# Patient Record
Sex: MALE | Race: WHITE | NOT HISPANIC OR LATINO | Employment: OTHER | URBAN - METROPOLITAN AREA
[De-identification: names, ages, dates, MRNs, and addresses within clinical notes are randomized per-mention and may not be internally consistent; named-entity substitution may affect disease eponyms.]

---

## 2017-03-16 RX ORDER — CARVEDILOL 6.25 MG/1
TABLET ORAL
Qty: 180 TABLET | Refills: 0 | Status: SHIPPED | OUTPATIENT
Start: 2017-03-16 | End: 2017-06-14 | Stop reason: SDUPTHER

## 2017-03-21 ENCOUNTER — OFFICE VISIT (OUTPATIENT)
Dept: INTERNAL MEDICINE | Age: 77
End: 2017-03-21

## 2017-03-21 VITALS
RESPIRATION RATE: 12 BRPM | DIASTOLIC BLOOD PRESSURE: 70 MMHG | WEIGHT: 201 LBS | BODY MASS INDEX: 28.77 KG/M2 | HEIGHT: 70 IN | HEART RATE: 72 BPM | SYSTOLIC BLOOD PRESSURE: 120 MMHG

## 2017-03-21 DIAGNOSIS — N18.30 TYPE 2 DIABETES MELLITUS WITH STAGE 3 CHRONIC KIDNEY DISEASE, WITHOUT LONG-TERM CURRENT USE OF INSULIN (HCC): ICD-10-CM

## 2017-03-21 DIAGNOSIS — I10 BENIGN ESSENTIAL HYPERTENSION: Primary | ICD-10-CM

## 2017-03-21 DIAGNOSIS — E78.2 MIXED HYPERLIPIDEMIA: ICD-10-CM

## 2017-03-21 DIAGNOSIS — E11.22 TYPE 2 DIABETES MELLITUS WITH STAGE 3 CHRONIC KIDNEY DISEASE, WITHOUT LONG-TERM CURRENT USE OF INSULIN (HCC): ICD-10-CM

## 2017-03-21 LAB
ALBUMIN SERPL-MCNC: 4.6 G/DL (ref 3.5–5.2)
ALBUMIN/GLOB SERPL: 1.6 G/DL
ALP SERPL-CCNC: 73 U/L (ref 39–117)
ALT SERPL-CCNC: 41 U/L (ref 1–41)
AST SERPL-CCNC: 24 U/L (ref 1–40)
BILIRUB SERPL-MCNC: 0.3 MG/DL (ref 0.1–1.2)
BUN SERPL-MCNC: 30 MG/DL (ref 8–23)
BUN/CREAT SERPL: 19 (ref 7–25)
CALCIUM SERPL-MCNC: 10 MG/DL (ref 8.6–10.5)
CHLORIDE SERPL-SCNC: 102 MMOL/L (ref 98–107)
CHOLEST SERPL-MCNC: 252 MG/DL (ref 0–200)
CO2 SERPL-SCNC: 24.3 MMOL/L (ref 22–29)
CREAT SERPL-MCNC: 1.58 MG/DL (ref 0.76–1.27)
GLOBULIN SER CALC-MCNC: 2.9 GM/DL
GLUCOSE SERPL-MCNC: 117 MG/DL (ref 65–99)
HBA1C MFR BLD: 5.79 % (ref 4.8–5.6)
HDLC SERPL-MCNC: 39 MG/DL (ref 40–60)
LDLC SERPL CALC-MCNC: 175 MG/DL (ref 0–100)
POTASSIUM SERPL-SCNC: 4.9 MMOL/L (ref 3.5–5.2)
PROT SERPL-MCNC: 7.5 G/DL (ref 6–8.5)
SODIUM SERPL-SCNC: 143 MMOL/L (ref 136–145)
TRIGL SERPL-MCNC: 188 MG/DL (ref 0–150)
VLDLC SERPL CALC-MCNC: 37.6 MG/DL (ref 5–40)

## 2017-03-21 PROCEDURE — 99214 OFFICE O/P EST MOD 30 MIN: CPT | Performed by: INTERNAL MEDICINE

## 2017-03-21 NOTE — PROGRESS NOTES
Benja Macedo is a 76 y.o. male who presents with   Chief Complaint   Patient presents with   • Hypertension     Checkup; lab updates   • Hyperlipidemia     As above.  Patient has not had any atorvastatin for the past 5 months.  He said he had back pain while taking it.  When he quit taking it the back pain went away.   • Diabetes     Checkup; lab updates.  Patient is not monitoring blood sugars at home.   .    Hypertension   This is a chronic problem. The current episode started more than 1 year ago. The problem is controlled. Past treatments include diuretics, beta blockers and ACE inhibitors. The current treatment provides moderate improvement. There are no compliance problems.  Hypertensive end-organ damage includes kidney disease. Identifiable causes of hypertension include chronic renal disease.   Hyperlipidemia   This is a chronic problem. The current episode started more than 1 year ago. The problem is controlled. Exacerbating diseases include chronic renal disease and diabetes. He is currently on no antihyperlipidemic treatment (Patient discontinue atorvastatin approximately 5 months ago because of back pain as noted in the history above.). Compliance problems include medication side effects.    Diabetes   He presents for his follow-up diabetic visit. He has type 2 diabetes mellitus. His disease course has been stable. There are no hypoglycemic complications. Symptoms are stable. Diabetic complications include nephropathy. Current diabetic treatment includes oral agent (monotherapy). He is compliant with treatment most of the time. An ACE inhibitor/angiotensin II receptor blocker is being taken. He does not see a podiatrist (Declines referral).Eye exam is not current (Declines referral).        The following portions of the patient's history were reviewed and updated as appropriate: allergies, current medications, past medical history and problem list.    Review of Systems   Constitutional: Negative.     HENT: Negative.    Eyes: Negative.    Respiratory: Negative.    Cardiovascular: Negative.    Genitourinary: Negative.    Musculoskeletal: Negative.    Skin: Negative.    Neurological: Negative.    Psychiatric/Behavioral: Negative.        Objective   Physical Exam   Constitutional: He is oriented to person, place, and time. He appears well-developed and well-nourished. No distress.   HENT:   Head: Normocephalic and atraumatic.   Eyes: Conjunctivae and EOM are normal. Pupils are equal, round, and reactive to light.   Neck: Normal range of motion. Neck supple. No thyromegaly present.   Neck exam negative.  Carotid auscultation normal-no bruits heard.   Cardiovascular: Normal rate, regular rhythm, normal heart sounds and intact distal pulses.  Exam reveals no gallop and no friction rub.    No murmur heard.  Pulmonary/Chest: Effort normal and breath sounds normal. No respiratory distress. He has no wheezes. He has no rales. He exhibits no tenderness.   Neurological: He is alert and oriented to person, place, and time.   Psychiatric: He has a normal mood and affect. His behavior is normal. Judgment and thought content normal.   Nursing note and vitals reviewed.      Assessment/Plan   Benja was seen today for hypertension, hyperlipidemia and diabetes.    Diagnoses and all orders for this visit:    Benign essential hypertension  -     Comprehensive Metabolic Panel  -     Lipid Panel    Mixed hyperlipidemia  -     Lipid Panel    Type 2 diabetes mellitus with stage 3 chronic kidney disease, without long-term current use of insulin  -     Comprehensive Metabolic Panel  -     Hemoglobin A1c        Plan: Labs as above.Today's exam unremarkable for any new problems or events.  Continue all current treatment as prescribed.  A follow-up checkup/lab update visit is advised for 6 months.    The patient has not had a colonoscopy.  The patient is aware of the risks of undiagnosed colon cancer including GI bleed, bowel obstruction,  bowel perforation, metastatic colon cancer and death.  The patient voices an understanding of these risks but also voices a willingness to accept those risks based on the current decision to decline a colonoscopy.

## 2017-03-31 RX ORDER — LISINOPRIL 20 MG/1
TABLET ORAL
Qty: 30 TABLET | Refills: 4 | Status: SHIPPED | OUTPATIENT
Start: 2017-03-31 | End: 2017-09-06 | Stop reason: SDUPTHER

## 2017-03-31 RX ORDER — ATORVASTATIN CALCIUM 20 MG/1
TABLET, FILM COATED ORAL
Qty: 30 TABLET | Refills: 4 | Status: SHIPPED | OUTPATIENT
Start: 2017-03-31 | End: 2017-09-21 | Stop reason: SINTOL

## 2017-04-05 RX ORDER — HYDROCHLOROTHIAZIDE 12.5 MG/1
TABLET ORAL
Qty: 30 TABLET | Refills: 2 | Status: SHIPPED | OUTPATIENT
Start: 2017-04-05 | End: 2017-07-12 | Stop reason: SDUPTHER

## 2017-04-25 DIAGNOSIS — E11.22 TYPE 2 DIABETES MELLITUS WITH DIABETIC CHRONIC KIDNEY DISEASE (HCC): ICD-10-CM

## 2017-06-14 RX ORDER — CARVEDILOL 6.25 MG/1
TABLET ORAL
Qty: 180 TABLET | Refills: 0 | Status: SHIPPED | OUTPATIENT
Start: 2017-06-14 | End: 2017-09-10 | Stop reason: SDUPTHER

## 2017-07-12 RX ORDER — HYDROCHLOROTHIAZIDE 12.5 MG/1
TABLET ORAL
Qty: 30 TABLET | Refills: 1 | Status: SHIPPED | OUTPATIENT
Start: 2017-07-12 | End: 2017-09-07 | Stop reason: SDUPTHER

## 2017-09-06 RX ORDER — LISINOPRIL 20 MG/1
TABLET ORAL
Qty: 30 TABLET | Refills: 0 | Status: SHIPPED | OUTPATIENT
Start: 2017-09-06 | End: 2017-10-07 | Stop reason: SDUPTHER

## 2017-09-07 RX ORDER — HYDROCHLOROTHIAZIDE 12.5 MG/1
TABLET ORAL
Qty: 30 TABLET | Refills: 0 | Status: SHIPPED | OUTPATIENT
Start: 2017-09-07 | End: 2017-10-07 | Stop reason: SDUPTHER

## 2017-09-11 RX ORDER — CARVEDILOL 6.25 MG/1
TABLET ORAL
Qty: 180 TABLET | Refills: 1 | Status: SHIPPED | OUTPATIENT
Start: 2017-09-11 | End: 2018-03-07 | Stop reason: SDUPTHER

## 2017-09-21 ENCOUNTER — OFFICE VISIT (OUTPATIENT)
Dept: INTERNAL MEDICINE | Age: 77
End: 2017-09-21

## 2017-09-21 VITALS
DIASTOLIC BLOOD PRESSURE: 66 MMHG | BODY MASS INDEX: 28.77 KG/M2 | HEIGHT: 70 IN | SYSTOLIC BLOOD PRESSURE: 110 MMHG | RESPIRATION RATE: 12 BRPM | HEART RATE: 72 BPM | WEIGHT: 201 LBS

## 2017-09-21 DIAGNOSIS — E11.22 TYPE 2 DIABETES MELLITUS WITH STAGE 3 CHRONIC KIDNEY DISEASE, WITHOUT LONG-TERM CURRENT USE OF INSULIN (HCC): ICD-10-CM

## 2017-09-21 DIAGNOSIS — R35.1 NOCTURIA: ICD-10-CM

## 2017-09-21 DIAGNOSIS — L60.0 INGROWN RIGHT BIG TOENAIL: ICD-10-CM

## 2017-09-21 DIAGNOSIS — N18.30 TYPE 2 DIABETES MELLITUS WITH STAGE 3 CHRONIC KIDNEY DISEASE, WITHOUT LONG-TERM CURRENT USE OF INSULIN (HCC): ICD-10-CM

## 2017-09-21 DIAGNOSIS — E78.2 MIXED HYPERLIPIDEMIA: ICD-10-CM

## 2017-09-21 DIAGNOSIS — I10 BENIGN ESSENTIAL HYPERTENSION: Primary | ICD-10-CM

## 2017-09-21 LAB
ALBUMIN SERPL-MCNC: 4.6 G/DL (ref 3.5–5.2)
ALBUMIN/GLOB SERPL: 1.6 G/DL
ALP SERPL-CCNC: 70 U/L (ref 39–117)
ALT SERPL-CCNC: 31 U/L (ref 1–41)
AST SERPL-CCNC: 17 U/L (ref 1–40)
BILIRUB SERPL-MCNC: 0.4 MG/DL (ref 0.1–1.2)
BUN SERPL-MCNC: 32 MG/DL (ref 8–23)
BUN/CREAT SERPL: 18.1 (ref 7–25)
CALCIUM SERPL-MCNC: 10.5 MG/DL (ref 8.6–10.5)
CHLORIDE SERPL-SCNC: 102 MMOL/L (ref 98–107)
CHOLEST SERPL-MCNC: 270 MG/DL (ref 0–200)
CO2 SERPL-SCNC: 23 MMOL/L (ref 22–29)
CREAT SERPL-MCNC: 1.77 MG/DL (ref 0.76–1.27)
GLOBULIN SER CALC-MCNC: 2.8 GM/DL
GLUCOSE SERPL-MCNC: 99 MG/DL (ref 65–99)
HBA1C MFR BLD: 5.72 % (ref 4.8–5.6)
HDLC SERPL-MCNC: 35 MG/DL (ref 40–60)
LDLC SERPL CALC-MCNC: 188 MG/DL (ref 0–100)
POTASSIUM SERPL-SCNC: 5 MMOL/L (ref 3.5–5.2)
PROT SERPL-MCNC: 7.4 G/DL (ref 6–8.5)
PSA SERPL-MCNC: 0.61 NG/ML (ref 0–4)
SODIUM SERPL-SCNC: 140 MMOL/L (ref 136–145)
TRIGL SERPL-MCNC: 235 MG/DL (ref 0–150)
VLDLC SERPL CALC-MCNC: 47 MG/DL (ref 5–40)

## 2017-09-21 PROCEDURE — 99214 OFFICE O/P EST MOD 30 MIN: CPT | Performed by: INTERNAL MEDICINE

## 2017-09-21 NOTE — PROGRESS NOTES
"  Benja Macedo is a 76 y.o. male who presents with   Chief Complaint   Patient presents with   • Hypertension     Checkup; lab updates   • Hyperlipidemia     As above.  Patient reports that he has not been taking his atorvastatin for at least 8 months because \"it made my back hurts and I could hardly get out of bed\".  When he quit taking it the muscular symptoms resolved.   • Diabetes     As above.  Currently taking metformin 500 mg every morning with breakfast.   • Nocturia     Nighttime urinary frequency, largely dependent on fluid intake prior bedtime.   • Ingrown toenail-right big toe     Unrelated reason for today's visit: As he was walking out of the examining room he said he would like a referral to a podiatrist for a right great toe ingrown toenail.  Also being a diabetic he would like a podiatry evaluation for neuropathy.   .    Hypertension   This is a chronic problem. The current episode started more than 1 year ago. The problem is controlled. Past treatments include diuretics, beta blockers and ACE inhibitors. The current treatment provides moderate improvement. There are no compliance problems.    Hyperlipidemia   This is a chronic problem. The problem is uncontrolled. Recent lipid tests were reviewed and are high. He is currently on no antihyperlipidemic treatment (Patient stopped taking Lipitor about 8 months ago because of back pain as per history above.).   Diabetes   He presents for his follow-up diabetic visit. He has type 2 diabetes mellitus. His disease course has been stable. There are no hypoglycemic complications. Symptoms are stable. Current diabetic treatment includes oral agent (monotherapy). He is compliant with treatment most of the time. His weight is stable. An ACE inhibitor/angiotensin II receptor blocker is being taken. Sees podiatrist: Patient being referred to podiatry on today's visit.       The following portions of the patient's history were reviewed and updated as " appropriate: allergies, current medications, past medical history and problem list.    Review of Systems   Constitutional: Negative.    HENT: Negative.    Eyes: Negative.    Respiratory: Negative.    Cardiovascular: Negative.    Genitourinary: Negative.    Musculoskeletal: Negative.    Skin: Negative.    Neurological: Negative.    Psychiatric/Behavioral: Negative.        Objective   Physical Exam   Constitutional: He is oriented to person, place, and time. He appears well-developed and well-nourished. No distress.   HENT:   Head: Normocephalic and atraumatic.   Eyes: Conjunctivae and EOM are normal. Pupils are equal, round, and reactive to light.   Neck: Normal range of motion. Neck supple. No thyromegaly present.   Neck exam negative.  Carotid auscultation normal-no bruits heard.   Cardiovascular: Normal rate, regular rhythm, normal heart sounds and intact distal pulses.  Exam reveals no gallop and no friction rub.    No murmur heard.  Pulmonary/Chest: Effort normal and breath sounds normal. No respiratory distress. He has no wheezes. He has no rales. He exhibits no tenderness.   Neurological: He is alert and oriented to person, place, and time.   Psychiatric: He has a normal mood and affect. His behavior is normal. Judgment and thought content normal.   Nursing note and vitals reviewed.      Assessment/Plan   Benja was seen today for hypertension, hyperlipidemia, diabetes, nocturia and ingrown toenail-right big toe.    Diagnoses and all orders for this visit:    Benign essential hypertension  -     Comprehensive Metabolic Panel    Mixed hyperlipidemia  -     Comprehensive Metabolic Panel  -     Lipid Panel    Type 2 diabetes mellitus with stage 3 chronic kidney disease, without long-term current use of insulin  -     Comprehensive Metabolic Panel  -     Hemoglobin A1c  -     Ambulatory Referral to Podiatry    Ingrown right big toenail  -     Ambulatory Referral to Podiatry    Nocturia  -     PSA        Plan:  Labs as above.Today's exam unremarkable for any new problems or events.  Continue all current treatment as prescribed.  A follow-up checkup/lab update visit is advised for 6 months.    He will remain off of Lipitor 20 mg daily for now pending today's results.  He currently is taking Co-Q-10 And we may be able to continue this medication along with a statin resumption if needed.    The patient has not had a screening colonoscopy.  The patient refuses to have one at this time.  The patient is aware of the risks of undiagnosed colon cancer including GI bleed, bowel obstruction, bowel perforation, metastatic colon cancer and death.  The patient voices an understanding of these risks but also voices a willingness to accept those risks based on the current decision to decline a colonoscopy.

## 2017-10-09 RX ORDER — HYDROCHLOROTHIAZIDE 12.5 MG/1
TABLET ORAL
Qty: 30 TABLET | Refills: 5 | Status: SHIPPED | OUTPATIENT
Start: 2017-10-09 | End: 2018-04-05 | Stop reason: SDUPTHER

## 2017-10-09 RX ORDER — LISINOPRIL 20 MG/1
TABLET ORAL
Qty: 30 TABLET | Refills: 5 | Status: SHIPPED | OUTPATIENT
Start: 2017-10-09 | End: 2018-04-05 | Stop reason: SDUPTHER

## 2017-12-05 DIAGNOSIS — N18.30 TYPE 2 DIABETES MELLITUS WITH STAGE 3 CHRONIC KIDNEY DISEASE, WITHOUT LONG-TERM CURRENT USE OF INSULIN (HCC): Primary | ICD-10-CM

## 2017-12-05 DIAGNOSIS — E11.22 TYPE 2 DIABETES MELLITUS WITH STAGE 3 CHRONIC KIDNEY DISEASE, WITHOUT LONG-TERM CURRENT USE OF INSULIN (HCC): Primary | ICD-10-CM

## 2018-03-07 RX ORDER — CARVEDILOL 6.25 MG/1
TABLET ORAL
Qty: 180 TABLET | Refills: 0 | Status: SHIPPED | OUTPATIENT
Start: 2018-03-07 | End: 2018-06-04 | Stop reason: SDUPTHER

## 2018-03-09 DIAGNOSIS — N18.30 TYPE 2 DIABETES MELLITUS WITH STAGE 3 CHRONIC KIDNEY DISEASE, WITHOUT LONG-TERM CURRENT USE OF INSULIN (HCC): ICD-10-CM

## 2018-03-09 DIAGNOSIS — E11.22 TYPE 2 DIABETES MELLITUS WITH STAGE 3 CHRONIC KIDNEY DISEASE, WITHOUT LONG-TERM CURRENT USE OF INSULIN (HCC): ICD-10-CM

## 2018-03-22 ENCOUNTER — OFFICE VISIT (OUTPATIENT)
Dept: INTERNAL MEDICINE | Age: 78
End: 2018-03-22

## 2018-03-22 VITALS
TEMPERATURE: 97.4 F | OXYGEN SATURATION: 98 % | HEIGHT: 70 IN | BODY MASS INDEX: 29.09 KG/M2 | WEIGHT: 203.2 LBS | DIASTOLIC BLOOD PRESSURE: 76 MMHG | SYSTOLIC BLOOD PRESSURE: 126 MMHG | HEART RATE: 80 BPM | RESPIRATION RATE: 12 BRPM

## 2018-03-22 DIAGNOSIS — E78.2 MIXED HYPERLIPIDEMIA: ICD-10-CM

## 2018-03-22 DIAGNOSIS — I10 BENIGN ESSENTIAL HYPERTENSION: Primary | ICD-10-CM

## 2018-03-22 DIAGNOSIS — N18.30 TYPE 2 DIABETES MELLITUS WITH STAGE 3 CHRONIC KIDNEY DISEASE, WITHOUT LONG-TERM CURRENT USE OF INSULIN (HCC): ICD-10-CM

## 2018-03-22 DIAGNOSIS — E11.22 TYPE 2 DIABETES MELLITUS WITH STAGE 3 CHRONIC KIDNEY DISEASE, WITHOUT LONG-TERM CURRENT USE OF INSULIN (HCC): ICD-10-CM

## 2018-03-22 LAB
ALBUMIN SERPL-MCNC: 4.6 G/DL (ref 3.5–5.2)
ALBUMIN/GLOB SERPL: 1.6 G/DL
ALP SERPL-CCNC: 69 U/L (ref 39–117)
ALT SERPL-CCNC: 33 U/L (ref 1–41)
AST SERPL-CCNC: 19 U/L (ref 1–40)
BILIRUB SERPL-MCNC: 0.4 MG/DL (ref 0.1–1.2)
BUN SERPL-MCNC: 33 MG/DL (ref 8–23)
BUN/CREAT SERPL: 18.8 (ref 7–25)
CALCIUM SERPL-MCNC: 10.5 MG/DL (ref 8.6–10.5)
CHLORIDE SERPL-SCNC: 100 MMOL/L (ref 98–107)
CHOLEST SERPL-MCNC: 270 MG/DL (ref 0–200)
CO2 SERPL-SCNC: 22.8 MMOL/L (ref 22–29)
CREAT SERPL-MCNC: 1.76 MG/DL (ref 0.76–1.27)
GFR SERPLBLD CREATININE-BSD FMLA CKD-EPI: 38 ML/MIN/1.73
GFR SERPLBLD CREATININE-BSD FMLA CKD-EPI: 46 ML/MIN/1.73
GLOBULIN SER CALC-MCNC: 2.8 GM/DL
GLUCOSE SERPL-MCNC: 130 MG/DL (ref 65–99)
HBA1C MFR BLD: 5.7 % (ref 4.8–5.6)
HDLC SERPL-MCNC: 34 MG/DL (ref 40–60)
LDLC SERPL CALC-MCNC: 185 MG/DL (ref 0–100)
POTASSIUM SERPL-SCNC: 5.2 MMOL/L (ref 3.5–5.2)
PROT SERPL-MCNC: 7.4 G/DL (ref 6–8.5)
SODIUM SERPL-SCNC: 139 MMOL/L (ref 136–145)
T4 FREE SERPL-MCNC: 1.44 NG/DL (ref 0.93–1.7)
TRIGL SERPL-MCNC: 257 MG/DL (ref 0–150)
TSH SERPL DL<=0.005 MIU/L-ACNC: 1.54 MIU/ML (ref 0.27–4.2)
VLDLC SERPL CALC-MCNC: 51.4 MG/DL (ref 5–40)

## 2018-03-22 PROCEDURE — 99214 OFFICE O/P EST MOD 30 MIN: CPT | Performed by: INTERNAL MEDICINE

## 2018-03-22 NOTE — PROGRESS NOTES
Benja Macedo is a 77 y.o. male who presents with   Chief Complaint   Patient presents with   • Hypertension   • Hyperlipidemia   • Diabetes   .    77-year-old male.  Presents for his six-month checkup/lab update visit.  No problems or complaints on today's visit.      Hypertension   This is a chronic problem. The current episode started more than 1 year ago. The problem is controlled. Past treatments include diuretics, beta blockers and ACE inhibitors. Compliance problems include diet and exercise.  Current antihypertension treatment includes diuretics, beta blockers and ACE inhibitors.   Hyperlipidemia   This is a chronic problem. The current episode started more than 1 year ago. Recent lipid tests were reviewed and are high. He is currently on no antihyperlipidemic treatment (Previous intolerance (myalgias) to atorvastatin.  ). Compliance problems include adherence to diet and adherence to exercise.    Diabetes   He presents for his follow-up diabetic visit. He has type 2 diabetes mellitus. His disease course has been stable. There are no hypoglycemic associated symptoms. There are no diabetic associated symptoms. There are no hypoglycemic complications. There are no diabetic complications. Current diabetic treatment includes oral agent (monotherapy). He is compliant with treatment most of the time. An ACE inhibitor/angiotensin II receptor blocker is being taken.        The following portions of the patient's history were reviewed and updated as appropriate: allergies, current medications, past medical history and problem list.    Review of Systems   Constitutional: Negative.    HENT: Negative.    Eyes: Negative.    Respiratory: Negative.    Cardiovascular: Negative.    Genitourinary: Negative.    Musculoskeletal: Negative.    Skin: Negative.    Neurological: Negative.    Psychiatric/Behavioral: Negative.        Objective   Physical Exam   Constitutional: He is oriented to person, place, and time. He  appears well-developed and well-nourished. No distress.   HENT:   Head: Normocephalic and atraumatic.   Eyes: Conjunctivae and EOM are normal. Pupils are equal, round, and reactive to light.   Neck: Normal range of motion. Neck supple. No thyromegaly present.   Neck exam negative.  Carotid auscultation normal-no bruits heard.   Cardiovascular: Normal rate, regular rhythm, normal heart sounds and intact distal pulses.  Exam reveals no gallop and no friction rub.    No murmur heard.  Pulmonary/Chest: Effort normal and breath sounds normal. No respiratory distress. He has no wheezes. He has no rales. He exhibits no tenderness.   Musculoskeletal:   Hemiparesthesias from previous stroke.  Patient cannot get out of a chair unless there are arms on the chair.  Also uses a walker for ambulation and stability.   Neurological: He is alert and oriented to person, place, and time.   Psychiatric: He has a normal mood and affect. His behavior is normal. Judgment and thought content normal.   Nursing note and vitals reviewed.      Assessment/Plan   Benja was seen today for hypertension, hyperlipidemia and diabetes.    Diagnoses and all orders for this visit:    Benign essential hypertension  -     Comprehensive Metabolic Panel  -     TSH+Free T4    Mixed hyperlipidemia  -     Comprehensive Metabolic Panel  -     Lipid Panel  -     TSH+Free T4    Type 2 diabetes mellitus with stage 3 chronic kidney disease, without long-term current use of insulin  -     Comprehensive Metabolic Panel  -     Hemoglobin A1c  -     TSH+Free T4        Plan: Labs as above.Today's exam unremarkable for any new problems or events.  Continue all current treatment as prescribed.  A follow-up checkup/lab update visit is advised for 6 months assuming today's labs are all acceptable.

## 2018-04-05 RX ORDER — LISINOPRIL 20 MG/1
TABLET ORAL
Qty: 30 TABLET | Refills: 4 | Status: SHIPPED | OUTPATIENT
Start: 2018-04-05 | End: 2018-09-09 | Stop reason: SDUPTHER

## 2018-04-05 RX ORDER — HYDROCHLOROTHIAZIDE 12.5 MG/1
TABLET ORAL
Qty: 30 TABLET | Refills: 4 | Status: SHIPPED | OUTPATIENT
Start: 2018-04-05 | End: 2018-09-09 | Stop reason: SDUPTHER

## 2018-05-05 DIAGNOSIS — N18.30 TYPE 2 DIABETES MELLITUS WITH STAGE 3 CHRONIC KIDNEY DISEASE, WITHOUT LONG-TERM CURRENT USE OF INSULIN (HCC): ICD-10-CM

## 2018-05-05 DIAGNOSIS — E11.22 TYPE 2 DIABETES MELLITUS WITH STAGE 3 CHRONIC KIDNEY DISEASE, WITHOUT LONG-TERM CURRENT USE OF INSULIN (HCC): ICD-10-CM

## 2018-06-02 DIAGNOSIS — E11.22 TYPE 2 DIABETES MELLITUS WITH STAGE 3 CHRONIC KIDNEY DISEASE, WITHOUT LONG-TERM CURRENT USE OF INSULIN (HCC): ICD-10-CM

## 2018-06-02 DIAGNOSIS — N18.30 TYPE 2 DIABETES MELLITUS WITH STAGE 3 CHRONIC KIDNEY DISEASE, WITHOUT LONG-TERM CURRENT USE OF INSULIN (HCC): ICD-10-CM

## 2018-06-04 RX ORDER — CARVEDILOL 6.25 MG/1
TABLET ORAL
Qty: 180 TABLET | Refills: 0 | Status: SHIPPED | OUTPATIENT
Start: 2018-06-04 | End: 2018-11-12 | Stop reason: SDUPTHER

## 2018-08-30 DIAGNOSIS — E11.22 TYPE 2 DIABETES MELLITUS WITH STAGE 3 CHRONIC KIDNEY DISEASE, WITHOUT LONG-TERM CURRENT USE OF INSULIN (HCC): ICD-10-CM

## 2018-08-30 DIAGNOSIS — N18.30 TYPE 2 DIABETES MELLITUS WITH STAGE 3 CHRONIC KIDNEY DISEASE, WITHOUT LONG-TERM CURRENT USE OF INSULIN (HCC): ICD-10-CM

## 2018-09-10 RX ORDER — LISINOPRIL 20 MG/1
TABLET ORAL
Qty: 30 TABLET | Refills: 0 | Status: SHIPPED | OUTPATIENT
Start: 2018-09-10 | End: 2018-10-09 | Stop reason: SDUPTHER

## 2018-09-10 RX ORDER — HYDROCHLOROTHIAZIDE 12.5 MG/1
TABLET ORAL
Qty: 30 TABLET | Refills: 3 | Status: SHIPPED | OUTPATIENT
Start: 2018-09-10 | End: 2019-01-12 | Stop reason: SDUPTHER

## 2018-09-26 ENCOUNTER — OFFICE VISIT (OUTPATIENT)
Dept: INTERNAL MEDICINE | Age: 78
End: 2018-09-26

## 2018-09-26 VITALS
BODY MASS INDEX: 28.06 KG/M2 | DIASTOLIC BLOOD PRESSURE: 72 MMHG | HEART RATE: 67 BPM | OXYGEN SATURATION: 96 % | WEIGHT: 196 LBS | RESPIRATION RATE: 13 BRPM | TEMPERATURE: 97.3 F | HEIGHT: 70 IN | SYSTOLIC BLOOD PRESSURE: 128 MMHG

## 2018-09-26 DIAGNOSIS — E78.2 MIXED HYPERLIPIDEMIA: ICD-10-CM

## 2018-09-26 DIAGNOSIS — E11.22 TYPE 2 DIABETES MELLITUS WITH STAGE 3 CHRONIC KIDNEY DISEASE, WITHOUT LONG-TERM CURRENT USE OF INSULIN (HCC): ICD-10-CM

## 2018-09-26 DIAGNOSIS — N18.30 TYPE 2 DIABETES MELLITUS WITH STAGE 3 CHRONIC KIDNEY DISEASE, WITHOUT LONG-TERM CURRENT USE OF INSULIN (HCC): ICD-10-CM

## 2018-09-26 DIAGNOSIS — I10 BENIGN ESSENTIAL HYPERTENSION: Primary | ICD-10-CM

## 2018-09-26 DIAGNOSIS — M54.50 ACUTE RIGHT-SIDED LOW BACK PAIN WITHOUT SCIATICA: ICD-10-CM

## 2018-09-26 LAB
ALBUMIN SERPL-MCNC: 4.8 G/DL (ref 3.5–5.2)
ALBUMIN/GLOB SERPL: 1.6 G/DL
ALP SERPL-CCNC: 72 U/L (ref 39–117)
ALT SERPL-CCNC: 34 U/L (ref 1–41)
AST SERPL-CCNC: 18 U/L (ref 1–40)
BILIRUB SERPL-MCNC: 0.3 MG/DL (ref 0.1–1.2)
BUN SERPL-MCNC: 28 MG/DL (ref 8–23)
BUN/CREAT SERPL: 16.4 (ref 7–25)
CALCIUM SERPL-MCNC: 10.8 MG/DL (ref 8.6–10.5)
CHLORIDE SERPL-SCNC: 100 MMOL/L (ref 98–107)
CHOLEST SERPL-MCNC: 275 MG/DL (ref 0–200)
CO2 SERPL-SCNC: 24.4 MMOL/L (ref 22–29)
CREAT SERPL-MCNC: 1.71 MG/DL (ref 0.76–1.27)
GLOBULIN SER CALC-MCNC: 3 GM/DL
GLUCOSE SERPL-MCNC: 95 MG/DL (ref 65–99)
HBA1C MFR BLD: 5.96 % (ref 4.8–5.6)
HDLC SERPL-MCNC: 35 MG/DL (ref 40–60)
LDLC SERPL CALC-MCNC: 182 MG/DL (ref 0–100)
POTASSIUM SERPL-SCNC: 5.1 MMOL/L (ref 3.5–5.2)
PROT SERPL-MCNC: 7.8 G/DL (ref 6–8.5)
SODIUM SERPL-SCNC: 140 MMOL/L (ref 136–145)
TRIGL SERPL-MCNC: 289 MG/DL (ref 0–150)
VLDLC SERPL CALC-MCNC: 57.8 MG/DL (ref 5–40)

## 2018-09-26 PROCEDURE — 99214 OFFICE O/P EST MOD 30 MIN: CPT | Performed by: INTERNAL MEDICINE

## 2018-09-26 NOTE — PROGRESS NOTES
"  Benja Macedo is a 77 y.o. male who presents with   Chief Complaint   Patient presents with   • Hypertension   • Hyperlipidemia   • Diabetes   • Back Pain     Right sacroiliac area.  Patient thinks he has a \"kidney infection\" but has no fever, chills or dysuria.  Back symptoms have been present for a couple of weeks he says.   .    77-year-old male.  Six-month checkup/lab update visit.  Only complaint is right lower sacroiliac back pain and suspicion of a \"kidney infection\" although he has absolutely no symptoms of a UTI or kidney infection.      Hypertension   This is a chronic problem. The current episode started more than 1 year ago. Current antihypertension treatment includes diuretics, ACE inhibitors and beta blockers. The current treatment provides moderate improvement. Compliance problems include diet and exercise.    Hyperlipidemia   This is a chronic problem. The current episode started more than 1 year ago. The problem is controlled. Recent lipid tests were reviewed and are normal. He is currently on no antihyperlipidemic treatment. Compliance problems include adherence to diet and adherence to exercise.    Diabetes   He presents for his follow-up diabetic visit. He has type 2 diabetes mellitus. His disease course has been stable. There are no hypoglycemic complications. Symptoms are stable. Current diabetic treatment includes oral agent (monotherapy). He is compliant with treatment most of the time. When asked about meal planning, he reported none. An ACE inhibitor/angiotensin II receptor blocker is being taken.   Back Pain   This is a new problem. The current episode started 1 to 4 weeks ago. The problem occurs intermittently. The problem has been gradually improving since onset. The pain is present in the lumbar spine. The symptoms are aggravated by bending, position and twisting. He has tried nothing for the symptoms.        The following portions of the patient's history were reviewed and " updated as appropriate: allergies, current medications, past medical history and problem list.    Review of Systems   Constitutional: Negative.    HENT: Negative.    Eyes: Negative.    Respiratory: Negative.    Cardiovascular: Negative.    Genitourinary: Negative.    Musculoskeletal: Positive for back pain.   Skin: Negative.    Neurological: Negative.    Psychiatric/Behavioral: Negative.        Objective   Physical Exam   Constitutional: He is oriented to person, place, and time. He appears well-developed and well-nourished. No distress.   HENT:   Head: Normocephalic and atraumatic.   Eyes: Pupils are equal, round, and reactive to light. Conjunctivae and EOM are normal.   Neck: Normal range of motion. Neck supple. No thyromegaly present.   Neck exam negative.  Carotid auscultation normal-no bruits heard.   Cardiovascular: Normal rate, regular rhythm, normal heart sounds and intact distal pulses.  Exam reveals no gallop and no friction rub.    No murmur heard.  Pulmonary/Chest: Effort normal and breath sounds normal. No respiratory distress. He has no wheezes. He has no rales. He exhibits no tenderness.   Musculoskeletal:   Post stroke patient.  Ambulatory with the use of a walker for stability.  He is palpably tender in the right sacroiliac area.   Neurological: He is alert and oriented to person, place, and time.   Psychiatric: He has a normal mood and affect. His behavior is normal. Judgment and thought content normal.   Nursing note and vitals reviewed.      Assessment/Plan   Benja was seen today for hypertension, hyperlipidemia, diabetes and back pain.    Diagnoses and all orders for this visit:    Benign essential hypertension  -     Comprehensive Metabolic Panel    Mixed hyperlipidemia  -     Comprehensive Metabolic Panel  -     Lipid Panel    Type 2 diabetes mellitus with stage 3 chronic kidney disease, without long-term current use of insulin (CMS/[facility])  -     Comprehensive Metabolic Panel  -     Hemoglobin  "A1c    Acute right-sided low back pain without sciatica  -     Comprehensive Metabolic Panel  -     Urinalysis With Microscopic If Indicated (No Culture) - Urine, Clean Catch      Plan: Labs as above for the issues as outlined.  He did not wish to get any back x-rays.  He will use Advil or Tylenol as needed for his symptoms which sound like back strain and do not sound like a \"kidney infection\".    He will continue all currently prescribed medications as they are and assuming today's labs are all acceptable then we will see him in 6 months for his checkup/lab update visit.    The patient has not had a screening colonoscopy.  The patient refuses to be referred to gastroenterology and to get a colonoscopy scheduled for the near future.  A COLOGUARD test was also declined.  The patient is aware of the risks of undiagnosed colon cancer including GI bleed, bowel obstruction, bowel perforation, metastatic colon cancer and death.  The patient voices an understanding of these risks but also voices a willingness to accept those risks based on the current decision to decline a colonoscopy.           "

## 2018-10-09 RX ORDER — LISINOPRIL 20 MG/1
TABLET ORAL
Qty: 30 TABLET | Refills: 5 | Status: SHIPPED | OUTPATIENT
Start: 2018-10-09 | End: 2019-04-18 | Stop reason: SDUPTHER

## 2018-11-12 RX ORDER — CARVEDILOL 6.25 MG/1
TABLET ORAL
Qty: 180 TABLET | Refills: 1 | Status: SHIPPED | OUTPATIENT
Start: 2018-11-12 | End: 2019-05-10 | Stop reason: SDUPTHER

## 2019-01-14 RX ORDER — HYDROCHLOROTHIAZIDE 12.5 MG/1
TABLET ORAL
Qty: 30 TABLET | Refills: 2 | Status: SHIPPED | OUTPATIENT
Start: 2019-01-14 | End: 2019-04-18 | Stop reason: SDUPTHER

## 2019-03-27 ENCOUNTER — OFFICE VISIT (OUTPATIENT)
Dept: INTERNAL MEDICINE | Age: 79
End: 2019-03-27

## 2019-03-27 VITALS
OXYGEN SATURATION: 98 % | WEIGHT: 182.4 LBS | BODY MASS INDEX: 26.11 KG/M2 | SYSTOLIC BLOOD PRESSURE: 110 MMHG | TEMPERATURE: 97.4 F | HEIGHT: 70 IN | HEART RATE: 68 BPM | DIASTOLIC BLOOD PRESSURE: 60 MMHG | RESPIRATION RATE: 13 BRPM

## 2019-03-27 DIAGNOSIS — N18.30 TYPE 2 DIABETES MELLITUS WITH STAGE 3 CHRONIC KIDNEY DISEASE, WITHOUT LONG-TERM CURRENT USE OF INSULIN (HCC): ICD-10-CM

## 2019-03-27 DIAGNOSIS — R73.9 HYPERGLYCEMIA: ICD-10-CM

## 2019-03-27 DIAGNOSIS — E78.2 MIXED HYPERLIPIDEMIA: ICD-10-CM

## 2019-03-27 DIAGNOSIS — E11.22 TYPE 2 DIABETES MELLITUS WITH STAGE 3 CHRONIC KIDNEY DISEASE, WITHOUT LONG-TERM CURRENT USE OF INSULIN (HCC): ICD-10-CM

## 2019-03-27 DIAGNOSIS — I10 BENIGN ESSENTIAL HYPERTENSION: Primary | ICD-10-CM

## 2019-03-27 PROCEDURE — 99214 OFFICE O/P EST MOD 30 MIN: CPT | Performed by: INTERNAL MEDICINE

## 2019-03-27 RX ORDER — UBIDECARENONE 100 MG
100 CAPSULE ORAL DAILY
COMMUNITY

## 2019-03-27 RX ORDER — ATORVASTATIN CALCIUM 20 MG/1
20 TABLET, FILM COATED ORAL DAILY
Qty: 30 TABLET | Refills: 5 | Status: SHIPPED | OUTPATIENT
Start: 2019-03-27 | End: 2019-09-29 | Stop reason: SDUPTHER

## 2019-03-27 NOTE — PROGRESS NOTES
Benja Macedo is a 78 y.o. male who presents with   Chief Complaint   Patient presents with   • Hyperlipidemia   • Hypertension   • Diabetes   .    78-year-old male.  6-month checkup/lab update visit.  No complaints or problems.  He wants to try taking Lipitor again now that he is taking co-Q10.      Hyperlipidemia   This is a chronic problem. The current episode started more than 1 year ago. The problem is controlled. He is currently on no antihyperlipidemic treatment. The current treatment provides no improvement of lipids. Compliance problems include adherence to diet and adherence to exercise.    Hypertension   This is a chronic problem. The current episode started more than 1 year ago. Current antihypertension treatment includes diuretics, beta blockers and ACE inhibitors. The current treatment provides moderate improvement. Compliance problems include diet and exercise.    Diabetes   He presents for his follow-up diabetic visit. He has type 2 diabetes mellitus. His disease course has been stable. There are no hypoglycemic complications. Symptoms are stable. There are no diabetic complications.        The following portions of the patient's history were reviewed and updated as appropriate: allergies, current medications, past medical history and problem list.    Review of Systems   Constitutional: Negative.    HENT: Negative.    Eyes: Negative.    Respiratory: Negative.    Cardiovascular: Negative.    Genitourinary: Negative.    Musculoskeletal: Negative.    Skin: Negative.    Neurological: Negative.    Psychiatric/Behavioral: Negative.        Objective   Physical Exam   Constitutional: He is oriented to person, place, and time. He appears well-developed and well-nourished. No distress.   HENT:   Head: Normocephalic and atraumatic.   Eyes: Conjunctivae and EOM are normal. Pupils are equal, round, and reactive to light.   Neck: Normal range of motion. Neck supple. No thyromegaly present.   Neck exam  negative.  Carotid auscultation normal-no bruits heard.   Cardiovascular: Normal rate, regular rhythm, normal heart sounds and intact distal pulses. Exam reveals no gallop and no friction rub.   No murmur heard.  Pulmonary/Chest: Effort normal and breath sounds normal. No respiratory distress. He has no wheezes. He has no rales. He exhibits no tenderness.   Musculoskeletal:   Ambulatory with walker assistance   Neurological: He is alert and oriented to person, place, and time.   Psychiatric: He has a normal mood and affect. His behavior is normal. Judgment and thought content normal.   Nursing note and vitals reviewed.      Assessment/Plan   Benja was seen today for hyperlipidemia, hypertension and diabetes.    Diagnoses and all orders for this visit:    Benign essential hypertension    Mixed hyperlipidemia    Hyperglycemia    Type 2 diabetes mellitus with stage 3 chronic kidney disease, without long-term current use of insulin (CMS/[facility])    Other orders  -     atorvastatin (LIPITOR) 20 MG tablet; Take 1 tablet by mouth Daily.      Plan: We will defer labs today pending his next visit in 6 months due to the fact that he wishes to resume atorvastatin 20 mg daily.  Continue all other meds as prescribed for now

## 2019-04-03 DIAGNOSIS — E11.22 TYPE 2 DIABETES MELLITUS WITH STAGE 3 CHRONIC KIDNEY DISEASE, WITHOUT LONG-TERM CURRENT USE OF INSULIN (HCC): ICD-10-CM

## 2019-04-03 DIAGNOSIS — N18.30 TYPE 2 DIABETES MELLITUS WITH STAGE 3 CHRONIC KIDNEY DISEASE, WITHOUT LONG-TERM CURRENT USE OF INSULIN (HCC): ICD-10-CM

## 2019-04-18 RX ORDER — LISINOPRIL 20 MG/1
TABLET ORAL
Qty: 30 TABLET | Refills: 5 | Status: SHIPPED | OUTPATIENT
Start: 2019-04-18 | End: 2019-10-28 | Stop reason: SDUPTHER

## 2019-04-18 RX ORDER — HYDROCHLOROTHIAZIDE 12.5 MG/1
TABLET ORAL
Qty: 30 TABLET | Refills: 5 | Status: SHIPPED | OUTPATIENT
Start: 2019-04-18 | End: 2019-10-28 | Stop reason: SDUPTHER

## 2019-05-10 RX ORDER — CARVEDILOL 6.25 MG/1
TABLET ORAL
Qty: 180 TABLET | Refills: 0 | Status: SHIPPED | OUTPATIENT
Start: 2019-05-10 | End: 2019-08-04 | Stop reason: SDUPTHER

## 2019-07-26 ENCOUNTER — OFFICE VISIT (OUTPATIENT)
Dept: INTERNAL MEDICINE | Age: 79
End: 2019-07-26

## 2019-07-26 VITALS
WEIGHT: 186.2 LBS | DIASTOLIC BLOOD PRESSURE: 70 MMHG | SYSTOLIC BLOOD PRESSURE: 110 MMHG | HEART RATE: 83 BPM | RESPIRATION RATE: 12 BRPM | OXYGEN SATURATION: 98 % | BODY MASS INDEX: 26.66 KG/M2 | TEMPERATURE: 97.1 F | HEIGHT: 70 IN

## 2019-07-26 DIAGNOSIS — N18.30 TYPE 2 DIABETES MELLITUS WITH STAGE 3 CHRONIC KIDNEY DISEASE, WITHOUT LONG-TERM CURRENT USE OF INSULIN (HCC): ICD-10-CM

## 2019-07-26 DIAGNOSIS — E11.22 TYPE 2 DIABETES MELLITUS WITH STAGE 3 CHRONIC KIDNEY DISEASE, WITHOUT LONG-TERM CURRENT USE OF INSULIN (HCC): ICD-10-CM

## 2019-07-26 DIAGNOSIS — Z23 ENCOUNTER FOR IMMUNIZATION: ICD-10-CM

## 2019-07-26 DIAGNOSIS — E78.2 MIXED HYPERLIPIDEMIA: ICD-10-CM

## 2019-07-26 DIAGNOSIS — R35.1 NOCTURIA: ICD-10-CM

## 2019-07-26 DIAGNOSIS — I10 BENIGN ESSENTIAL HYPERTENSION: Primary | ICD-10-CM

## 2019-07-26 LAB
ALBUMIN SERPL-MCNC: 4.5 G/DL (ref 3.5–5.2)
ALBUMIN/GLOB SERPL: 1.5 G/DL
ALP SERPL-CCNC: 87 U/L (ref 39–117)
ALT SERPL-CCNC: 25 U/L (ref 1–41)
AST SERPL-CCNC: 15 U/L (ref 1–40)
BILIRUB SERPL-MCNC: 0.5 MG/DL (ref 0.2–1.2)
BUN SERPL-MCNC: 27 MG/DL (ref 8–23)
BUN/CREAT SERPL: 16 (ref 7–25)
CALCIUM SERPL-MCNC: 10.1 MG/DL (ref 8.6–10.5)
CHLORIDE SERPL-SCNC: 104 MMOL/L (ref 98–107)
CHOLEST SERPL-MCNC: 169 MG/DL (ref 0–200)
CO2 SERPL-SCNC: 23.3 MMOL/L (ref 22–29)
CREAT SERPL-MCNC: 1.69 MG/DL (ref 0.76–1.27)
GLOBULIN SER CALC-MCNC: 3 GM/DL
GLUCOSE SERPL-MCNC: 102 MG/DL (ref 65–99)
HBA1C MFR BLD: 6.1 % (ref 4.8–5.6)
HDLC SERPL-MCNC: 33 MG/DL (ref 40–60)
LDLC SERPL CALC-MCNC: 100 MG/DL (ref 0–100)
POTASSIUM SERPL-SCNC: 5.1 MMOL/L (ref 3.5–5.2)
PROT SERPL-MCNC: 7.5 G/DL (ref 6–8.5)
PSA SERPL-MCNC: 0.64 NG/ML (ref 0–4)
SODIUM SERPL-SCNC: 141 MMOL/L (ref 136–145)
T4 FREE SERPL-MCNC: 1.42 NG/DL (ref 0.93–1.7)
TRIGL SERPL-MCNC: 180 MG/DL (ref 0–150)
TSH SERPL DL<=0.005 MIU/L-ACNC: 1.32 MIU/ML (ref 0.27–4.2)
VLDLC SERPL CALC-MCNC: 36 MG/DL

## 2019-07-26 PROCEDURE — 99214 OFFICE O/P EST MOD 30 MIN: CPT | Performed by: INTERNAL MEDICINE

## 2019-07-26 PROCEDURE — 90471 IMMUNIZATION ADMIN: CPT | Performed by: INTERNAL MEDICINE

## 2019-07-26 PROCEDURE — 90715 TDAP VACCINE 7 YRS/> IM: CPT | Performed by: INTERNAL MEDICINE

## 2019-07-26 RX ORDER — IBUPROFEN 400 MG/1
400 TABLET ORAL NIGHTLY
COMMUNITY

## 2019-07-26 NOTE — PROGRESS NOTES
Benja Macedo is a 78 y.o. male who presents with   Chief Complaint   Patient presents with   • Hypertension     Carvedilol 6.25; hydrochlorothiazide 12.5; lisinopril 20   • Hyperlipidemia     Atorvastatin 20   • Diabetes     Metformin 500   • Nocturia     Nighttime urinary frequency dependent upon fluid consumption prior to bedtime   .    78-year-old male.  6-month checkup/lab update visit.  No complaints or problems.      Hypertension   This is a chronic problem. The current episode started more than 1 year ago. The problem is controlled. Current antihypertension treatment includes diuretics, beta blockers and ACE inhibitors. The current treatment provides moderate improvement. Compliance problems include diet and exercise.    Hyperlipidemia   This is a chronic problem. The current episode started more than 1 year ago. The problem is controlled. Current antihyperlipidemic treatment includes statins. The current treatment provides moderate improvement of lipids. Compliance problems include adherence to diet and adherence to exercise.    Diabetes   He presents for his follow-up diabetic visit. He has type 2 diabetes mellitus. His disease course has been stable. There are no hypoglycemic associated symptoms. There are no hypoglycemic complications. Current diabetic treatment includes oral agent (monotherapy). There is no change in his home blood glucose trend. An ACE inhibitor/angiotensin II receptor blocker is being taken.        The following portions of the patient's history were reviewed and updated as appropriate: allergies, current medications, past medical history and problem list.    Review of Systems   Constitutional: Negative.    HENT: Negative.    Eyes: Negative.    Respiratory: Negative.    Cardiovascular: Negative.    Genitourinary: Negative.    Musculoskeletal: Negative.    Skin: Negative.    Neurological: Negative.    Psychiatric/Behavioral: Negative.        Objective   Physical Exam    Constitutional: He is oriented to person, place, and time. He appears well-developed and well-nourished. No distress.   HENT:   Head: Normocephalic and atraumatic.   Eyes: Conjunctivae and EOM are normal. Pupils are equal, round, and reactive to light.   Neck: Normal range of motion. Neck supple. No thyromegaly present.   Neck exam negative.  Carotid auscultation normal-no bruits heard.   Cardiovascular: Normal rate, regular rhythm, normal heart sounds and intact distal pulses. Exam reveals no gallop and no friction rub.   No murmur heard.  Pulmonary/Chest: Effort normal and breath sounds normal. No respiratory distress. He has no wheezes. He has no rales. He exhibits no tenderness.   Neurological: He is alert and oriented to person, place, and time.   Psychiatric: He has a normal mood and affect. His behavior is normal. Judgment and thought content normal.   Nursing note and vitals reviewed.      Assessment/Plan   Benja was seen today for hypertension, hyperlipidemia, diabetes and nocturia.    Diagnoses and all orders for this visit:    Benign essential hypertension  -     Comprehensive Metabolic Panel  -     TSH+Free T4    Mixed hyperlipidemia  -     Comprehensive Metabolic Panel  -     Lipid Panel  -     TSH+Free T4    Type 2 diabetes mellitus with stage 3 chronic kidney disease, without long-term current use of insulin (CMS/[facility])  -     Comprehensive Metabolic Panel  -     Hemoglobin A1c  -     TSH+Free T4  -     MicroAlbumin, Urine, Random - Urine, Clean Catch    Nocturia  -     PSA DIAGNOSTIC      Plan: Labs as above for the issues as outlined.  Patient declines colonoscopy referral.  Accepts risks of his decision.  Continue all treatment as prescribed.  Follow-up checkup/lab update visit in 6 months.  He also declines a Medicare annual wellness visit.    Tdap/Td updated today

## 2019-08-05 RX ORDER — CARVEDILOL 6.25 MG/1
TABLET ORAL
Qty: 180 TABLET | Refills: 0 | Status: SHIPPED | OUTPATIENT
Start: 2019-08-05 | End: 2019-10-30 | Stop reason: SDUPTHER

## 2019-08-31 DIAGNOSIS — E11.22 TYPE 2 DIABETES MELLITUS WITH STAGE 3 CHRONIC KIDNEY DISEASE, WITHOUT LONG-TERM CURRENT USE OF INSULIN (HCC): ICD-10-CM

## 2019-08-31 DIAGNOSIS — N18.30 TYPE 2 DIABETES MELLITUS WITH STAGE 3 CHRONIC KIDNEY DISEASE, WITHOUT LONG-TERM CURRENT USE OF INSULIN (HCC): ICD-10-CM

## 2019-09-30 RX ORDER — ATORVASTATIN CALCIUM 20 MG/1
TABLET, FILM COATED ORAL
Qty: 30 TABLET | Refills: 4 | Status: SHIPPED | OUTPATIENT
Start: 2019-09-30 | End: 2020-02-25

## 2019-10-28 RX ORDER — HYDROCHLOROTHIAZIDE 12.5 MG/1
TABLET ORAL
Qty: 90 TABLET | Refills: 1 | Status: SHIPPED | OUTPATIENT
Start: 2019-10-28 | End: 2020-04-23

## 2019-10-28 RX ORDER — LISINOPRIL 20 MG/1
TABLET ORAL
Qty: 90 TABLET | Refills: 1 | Status: SHIPPED | OUTPATIENT
Start: 2019-10-28 | End: 2020-04-23

## 2019-10-30 RX ORDER — CARVEDILOL 6.25 MG/1
6.25 TABLET ORAL 2 TIMES DAILY WITH MEALS
Qty: 180 TABLET | Refills: 0 | Status: SHIPPED | OUTPATIENT
Start: 2019-10-30 | End: 2020-01-24

## 2020-01-24 RX ORDER — CARVEDILOL 6.25 MG/1
TABLET ORAL
Qty: 180 TABLET | Refills: 3 | Status: SHIPPED | OUTPATIENT
Start: 2020-01-24 | End: 2021-01-14 | Stop reason: SDUPTHER

## 2020-02-06 ENCOUNTER — OFFICE VISIT (OUTPATIENT)
Dept: INTERNAL MEDICINE | Age: 80
End: 2020-02-06

## 2020-02-06 VITALS
HEART RATE: 62 BPM | SYSTOLIC BLOOD PRESSURE: 120 MMHG | OXYGEN SATURATION: 99 % | DIASTOLIC BLOOD PRESSURE: 60 MMHG | TEMPERATURE: 98.1 F | WEIGHT: 161.4 LBS | HEIGHT: 70 IN | RESPIRATION RATE: 13 BRPM | BODY MASS INDEX: 23.11 KG/M2

## 2020-02-06 DIAGNOSIS — N18.30 TYPE 2 DIABETES MELLITUS WITH STAGE 3 CHRONIC KIDNEY DISEASE, WITHOUT LONG-TERM CURRENT USE OF INSULIN (HCC): ICD-10-CM

## 2020-02-06 DIAGNOSIS — E78.2 MIXED HYPERLIPIDEMIA: ICD-10-CM

## 2020-02-06 DIAGNOSIS — I10 BENIGN ESSENTIAL HYPERTENSION: Primary | ICD-10-CM

## 2020-02-06 DIAGNOSIS — E11.22 TYPE 2 DIABETES MELLITUS WITH STAGE 3 CHRONIC KIDNEY DISEASE, WITHOUT LONG-TERM CURRENT USE OF INSULIN (HCC): ICD-10-CM

## 2020-02-06 LAB
ALBUMIN SERPL-MCNC: 4.4 G/DL (ref 3.5–5.2)
ALBUMIN/GLOB SERPL: 1.7 G/DL
ALP SERPL-CCNC: 87 U/L (ref 39–117)
ALT SERPL-CCNC: 19 U/L (ref 1–41)
AST SERPL-CCNC: 12 U/L (ref 1–40)
BILIRUB SERPL-MCNC: 0.4 MG/DL (ref 0.2–1.2)
BUN SERPL-MCNC: 31 MG/DL (ref 8–23)
BUN/CREAT SERPL: 18.5 (ref 7–25)
CALCIUM SERPL-MCNC: 10.2 MG/DL (ref 8.6–10.5)
CHLORIDE SERPL-SCNC: 103 MMOL/L (ref 98–107)
CHOLEST SERPL-MCNC: 155 MG/DL (ref 0–200)
CO2 SERPL-SCNC: 23.6 MMOL/L (ref 22–29)
CREAT SERPL-MCNC: 1.68 MG/DL (ref 0.76–1.27)
GLOBULIN SER CALC-MCNC: 2.6 GM/DL
GLUCOSE SERPL-MCNC: 95 MG/DL (ref 65–99)
HBA1C MFR BLD: 6 % (ref 4.8–5.6)
HDLC SERPL-MCNC: 36 MG/DL (ref 40–60)
LDLC SERPL CALC-MCNC: 90 MG/DL (ref 0–100)
POTASSIUM SERPL-SCNC: 5.3 MMOL/L (ref 3.5–5.2)
PROT SERPL-MCNC: 7 G/DL (ref 6–8.5)
SODIUM SERPL-SCNC: 139 MMOL/L (ref 136–145)
TRIGL SERPL-MCNC: 147 MG/DL (ref 0–150)
VLDLC SERPL CALC-MCNC: 29.4 MG/DL

## 2020-02-06 PROCEDURE — 99214 OFFICE O/P EST MOD 30 MIN: CPT | Performed by: INTERNAL MEDICINE

## 2020-02-06 RX ORDER — ASCORBIC ACID 500 MG
500 TABLET ORAL DAILY
COMMUNITY

## 2020-02-06 RX ORDER — ACETAMINOPHEN 500 MG
1000 TABLET ORAL NIGHTLY PRN
COMMUNITY

## 2020-02-06 RX ORDER — CETIRIZINE HYDROCHLORIDE 10 MG/1
10 TABLET ORAL DAILY
COMMUNITY

## 2020-02-06 RX ORDER — DIPHENHYDRAMINE HCL 25 MG
25 TABLET ORAL NIGHTLY PRN
COMMUNITY

## 2020-02-06 NOTE — PROGRESS NOTES
"  Benja Macedo is a 79 y.o. male who presents with   Chief Complaint   Patient presents with   • Hypertension     Carvedilol 6.25 mg; hydrochlorothiazide 12.5 mg; lisinopril 20 mg   • Hyperlipidemia     Atorvastatin 20 mg   • Diabetes     Metformin 500 mg   .    79-year-old male.  6-month checkup/lab update visit.  No complaints    Hypertension   This is a chronic problem. The current episode started more than 1 year ago. The problem is unchanged. The problem is controlled. Current antihypertension treatment includes beta blockers, diuretics and ACE inhibitors. The current treatment provides moderate improvement. Compliance problems include diet.    Hyperlipidemia   This is a chronic problem. The current episode started more than 1 year ago. The problem is controlled. Recent lipid tests were reviewed and are normal. Current antihyperlipidemic treatment includes statins. Compliance problems include adherence to diet.    Diabetes   He presents for his follow-up diabetic visit. He has type 2 diabetes mellitus. His disease course has been stable. There are no hypoglycemic associated symptoms. There are no diabetic associated symptoms. There are no hypoglycemic complications. Symptoms are stable. Diabetic complications include nephropathy. Current diabetic treatment includes oral agent (monotherapy). He is compliant with treatment most of the time. An ACE inhibitor/angiotensin II receptor blocker is being taken.        /60   Pulse 62   Temp 98.1 °F (36.7 °C)   Resp 13   Ht 177.8 cm (70\")   Wt 73.2 kg (161 lb 6.4 oz)   SpO2 99%   BMI 23.16 kg/m²     The following portions of the patient's history were reviewed and updated as appropriate: allergies, current medications, past medical history and problem list.    Review of Systems   Constitutional: Negative.    HENT: Negative.    Eyes: Negative.    Respiratory: Negative.    Cardiovascular: Negative.    Genitourinary: Negative.    Musculoskeletal: Negative. "    Skin: Negative.    Neurological: Negative.    Psychiatric/Behavioral: Negative.        Objective   Physical Exam   Constitutional: He is oriented to person, place, and time. He appears well-developed and well-nourished. No distress.   HENT:   Head: Normocephalic and atraumatic.   Eyes: Pupils are equal, round, and reactive to light. Conjunctivae and EOM are normal.   Neck: Normal range of motion. Neck supple. No thyromegaly present.   Neck exam negative.  Carotid auscultation normal-no bruits heard.   Cardiovascular: Normal rate, regular rhythm, normal heart sounds and intact distal pulses. Exam reveals no gallop and no friction rub.   No murmur heard.  Pulmonary/Chest: Effort normal and breath sounds normal. No respiratory distress. He has no wheezes. He has no rales. He exhibits no tenderness.   Neurological: He is alert and oriented to person, place, and time.   Psychiatric: He has a normal mood and affect. His behavior is normal. Judgment and thought content normal.   Nursing note and vitals reviewed.      Assessment/Plan   Benja was seen today for hypertension, hyperlipidemia and diabetes.    Diagnoses and all orders for this visit:    Benign essential hypertension  -     Comprehensive Metabolic Panel    Mixed hyperlipidemia  -     Comprehensive Metabolic Panel  -     Lipid Panel    Type 2 diabetes mellitus with stage 3 chronic kidney disease, without long-term current use of insulin (CMS/[facility])  -     Comprehensive Metabolic Panel  -     Hemoglobin A1c        Plan: Labs as above.Today's exam unremarkable for any new problems or events.  Continue all current treatment as prescribed.  A follow-up checkup/lab update visit is advised for 6 months assuming today's labs are all acceptable.    Medicare wellness  visit advised-declined

## 2020-02-07 NOTE — PROGRESS NOTES
With minor variations the Comprehensive Metabolic Panel,Lipid Panel, A1c are within stable and acceptable ranges without any significant changes from the last several lab tests. Continue all meds as they are. A followup visit with fasting lab updates is advised for 6 months.

## 2020-02-25 RX ORDER — ATORVASTATIN CALCIUM 20 MG/1
TABLET, FILM COATED ORAL
Qty: 30 TABLET | Refills: 5 | Status: SHIPPED | OUTPATIENT
Start: 2020-02-25 | End: 2020-08-20

## 2020-03-04 DIAGNOSIS — N18.30 TYPE 2 DIABETES MELLITUS WITH STAGE 3 CHRONIC KIDNEY DISEASE, WITHOUT LONG-TERM CURRENT USE OF INSULIN (HCC): ICD-10-CM

## 2020-03-04 DIAGNOSIS — E11.22 TYPE 2 DIABETES MELLITUS WITH STAGE 3 CHRONIC KIDNEY DISEASE, WITHOUT LONG-TERM CURRENT USE OF INSULIN (HCC): ICD-10-CM

## 2020-04-23 DIAGNOSIS — I10 ESSENTIAL HYPERTENSION: Primary | ICD-10-CM

## 2020-04-23 RX ORDER — LISINOPRIL 20 MG/1
TABLET ORAL
Qty: 30 TABLET | Refills: 0 | Status: SHIPPED | OUTPATIENT
Start: 2020-04-23 | End: 2020-05-20

## 2020-04-23 RX ORDER — HYDROCHLOROTHIAZIDE 12.5 MG/1
TABLET ORAL
Qty: 30 TABLET | Refills: 0 | Status: SHIPPED | OUTPATIENT
Start: 2020-04-23 | End: 2020-05-20

## 2020-05-20 DIAGNOSIS — I10 ESSENTIAL HYPERTENSION: ICD-10-CM

## 2020-05-20 RX ORDER — HYDROCHLOROTHIAZIDE 12.5 MG/1
TABLET ORAL
Qty: 30 TABLET | Refills: 0 | Status: SHIPPED | OUTPATIENT
Start: 2020-05-20 | End: 2020-06-17

## 2020-05-20 RX ORDER — LISINOPRIL 20 MG/1
TABLET ORAL
Qty: 30 TABLET | Refills: 0 | Status: SHIPPED | OUTPATIENT
Start: 2020-05-20 | End: 2020-06-17

## 2020-06-17 DIAGNOSIS — I10 ESSENTIAL HYPERTENSION: ICD-10-CM

## 2020-06-17 RX ORDER — LISINOPRIL 20 MG/1
TABLET ORAL
Qty: 30 TABLET | Refills: 0 | Status: SHIPPED | OUTPATIENT
Start: 2020-06-17 | End: 2020-07-14

## 2020-06-17 RX ORDER — HYDROCHLOROTHIAZIDE 12.5 MG/1
TABLET ORAL
Qty: 30 TABLET | Refills: 0 | Status: SHIPPED | OUTPATIENT
Start: 2020-06-17 | End: 2020-07-14

## 2020-07-14 DIAGNOSIS — I10 ESSENTIAL HYPERTENSION: ICD-10-CM

## 2020-07-14 RX ORDER — LISINOPRIL 20 MG/1
TABLET ORAL
Qty: 30 TABLET | Refills: 5 | Status: SHIPPED | OUTPATIENT
Start: 2020-07-14 | End: 2021-01-11

## 2020-07-14 RX ORDER — HYDROCHLOROTHIAZIDE 12.5 MG/1
TABLET ORAL
Qty: 30 TABLET | Refills: 5 | Status: SHIPPED | OUTPATIENT
Start: 2020-07-14 | End: 2021-01-11

## 2020-08-07 ENCOUNTER — OFFICE VISIT (OUTPATIENT)
Dept: INTERNAL MEDICINE | Age: 80
End: 2020-08-07

## 2020-08-07 VITALS
HEIGHT: 70 IN | TEMPERATURE: 98.4 F | OXYGEN SATURATION: 96 % | RESPIRATION RATE: 13 BRPM | SYSTOLIC BLOOD PRESSURE: 120 MMHG | DIASTOLIC BLOOD PRESSURE: 70 MMHG | BODY MASS INDEX: 29.12 KG/M2 | WEIGHT: 203.4 LBS | HEART RATE: 79 BPM

## 2020-08-07 DIAGNOSIS — E78.2 MIXED HYPERLIPIDEMIA: ICD-10-CM

## 2020-08-07 DIAGNOSIS — E11.22 TYPE 2 DIABETES MELLITUS WITH STAGE 3 CHRONIC KIDNEY DISEASE, WITHOUT LONG-TERM CURRENT USE OF INSULIN (HCC): ICD-10-CM

## 2020-08-07 DIAGNOSIS — N18.30 TYPE 2 DIABETES MELLITUS WITH STAGE 3 CHRONIC KIDNEY DISEASE, WITHOUT LONG-TERM CURRENT USE OF INSULIN (HCC): ICD-10-CM

## 2020-08-07 DIAGNOSIS — Z00.00 HEALTHCARE MAINTENANCE: ICD-10-CM

## 2020-08-07 DIAGNOSIS — I10 BENIGN ESSENTIAL HYPERTENSION: Primary | ICD-10-CM

## 2020-08-07 PROCEDURE — 99214 OFFICE O/P EST MOD 30 MIN: CPT | Performed by: INTERNAL MEDICINE

## 2020-08-08 LAB
ALBUMIN SERPL-MCNC: 4.7 G/DL (ref 3.7–4.7)
ALBUMIN/GLOB SERPL: 1.7 {RATIO} (ref 1.2–2.2)
ALP SERPL-CCNC: 84 IU/L (ref 39–117)
ALT SERPL-CCNC: 22 IU/L (ref 0–44)
AST SERPL-CCNC: 15 IU/L (ref 0–40)
BILIRUB SERPL-MCNC: 0.4 MG/DL (ref 0–1.2)
BUN SERPL-MCNC: 32 MG/DL (ref 8–27)
BUN/CREAT SERPL: 18 (ref 10–24)
CALCIUM SERPL-MCNC: 9.9 MG/DL (ref 8.6–10.2)
CHLORIDE SERPL-SCNC: 107 MMOL/L (ref 96–106)
CHOLEST SERPL-MCNC: 148 MG/DL (ref 100–199)
CO2 SERPL-SCNC: 19 MMOL/L (ref 20–29)
CREAT SERPL-MCNC: 1.76 MG/DL (ref 0.76–1.27)
GLOBULIN SER CALC-MCNC: 2.7 G/DL (ref 1.5–4.5)
GLUCOSE SERPL-MCNC: 104 MG/DL (ref 65–99)
HBA1C MFR BLD: 6.1 % (ref 4.8–5.6)
HCV AB S/CO SERPL IA: 0.2 S/CO RATIO (ref 0–0.9)
HDLC SERPL-MCNC: 37 MG/DL
LDLC SERPL CALC-MCNC: 88 MG/DL (ref 0–99)
MICROALBUMIN UR-MCNC: NORMAL UG/ML
POTASSIUM SERPL-SCNC: 4.9 MMOL/L (ref 3.5–5.2)
PROT SERPL-MCNC: 7.4 G/DL (ref 6–8.5)
REQUEST PROBLEM: NORMAL
SODIUM SERPL-SCNC: 143 MMOL/L (ref 134–144)
TRIGL SERPL-MCNC: 116 MG/DL (ref 0–149)
UNABLE TO VOID: NORMAL
VLDLC SERPL CALC-MCNC: 23 MG/DL (ref 5–40)

## 2020-08-20 DIAGNOSIS — E78.2 MIXED HYPERLIPIDEMIA: Primary | ICD-10-CM

## 2020-08-20 RX ORDER — ATORVASTATIN CALCIUM 20 MG/1
TABLET, FILM COATED ORAL
Qty: 30 TABLET | Refills: 4 | Status: SHIPPED | OUTPATIENT
Start: 2020-08-20 | End: 2021-02-18 | Stop reason: SDUPTHER

## 2021-01-11 DIAGNOSIS — I10 ESSENTIAL HYPERTENSION: ICD-10-CM

## 2021-01-11 RX ORDER — LISINOPRIL 20 MG/1
TABLET ORAL
Qty: 90 TABLET | Refills: 4 | Status: SHIPPED | OUTPATIENT
Start: 2021-01-11 | End: 2022-05-17 | Stop reason: ALTCHOICE

## 2021-01-11 RX ORDER — HYDROCHLOROTHIAZIDE 12.5 MG/1
TABLET ORAL
Qty: 90 TABLET | Refills: 4 | Status: SHIPPED | OUTPATIENT
Start: 2021-01-11 | End: 2021-03-02

## 2021-01-14 RX ORDER — CARVEDILOL 6.25 MG/1
6.25 TABLET ORAL 2 TIMES DAILY WITH MEALS
Qty: 180 TABLET | Refills: 0 | Status: SHIPPED | OUTPATIENT
Start: 2021-01-14 | End: 2021-04-19

## 2021-02-18 DIAGNOSIS — E78.2 MIXED HYPERLIPIDEMIA: ICD-10-CM

## 2021-02-18 RX ORDER — ATORVASTATIN CALCIUM 20 MG/1
20 TABLET, FILM COATED ORAL DAILY
Qty: 30 TABLET | Refills: 0 | Status: SHIPPED | OUTPATIENT
Start: 2021-02-18 | End: 2021-03-22

## 2021-03-01 DIAGNOSIS — E11.22 TYPE 2 DIABETES MELLITUS WITH STAGE 3 CHRONIC KIDNEY DISEASE, WITHOUT LONG-TERM CURRENT USE OF INSULIN (HCC): ICD-10-CM

## 2021-03-01 DIAGNOSIS — N18.30 TYPE 2 DIABETES MELLITUS WITH STAGE 3 CHRONIC KIDNEY DISEASE, WITHOUT LONG-TERM CURRENT USE OF INSULIN (HCC): ICD-10-CM

## 2021-03-02 ENCOUNTER — OFFICE VISIT (OUTPATIENT)
Dept: INTERNAL MEDICINE | Age: 81
End: 2021-03-02

## 2021-03-02 VITALS
OXYGEN SATURATION: 98 % | HEIGHT: 70 IN | HEART RATE: 71 BPM | DIASTOLIC BLOOD PRESSURE: 58 MMHG | SYSTOLIC BLOOD PRESSURE: 110 MMHG | BODY MASS INDEX: 25.91 KG/M2 | TEMPERATURE: 97.1 F | WEIGHT: 181 LBS

## 2021-03-02 DIAGNOSIS — E78.2 MIXED HYPERLIPIDEMIA: ICD-10-CM

## 2021-03-02 DIAGNOSIS — E11.9 TYPE 2 DIABETES MELLITUS WITHOUT COMPLICATION, WITHOUT LONG-TERM CURRENT USE OF INSULIN (HCC): ICD-10-CM

## 2021-03-02 DIAGNOSIS — Z76.89 ENCOUNTER TO ESTABLISH CARE WITH NEW DOCTOR: Primary | ICD-10-CM

## 2021-03-02 DIAGNOSIS — I10 BENIGN ESSENTIAL HYPERTENSION: ICD-10-CM

## 2021-03-02 DIAGNOSIS — Z23 NEED FOR INFLUENZA VACCINATION: ICD-10-CM

## 2021-03-02 PROCEDURE — 90694 VACC AIIV4 NO PRSRV 0.5ML IM: CPT | Performed by: NURSE PRACTITIONER

## 2021-03-02 PROCEDURE — 99214 OFFICE O/P EST MOD 30 MIN: CPT | Performed by: NURSE PRACTITIONER

## 2021-03-02 PROCEDURE — G0008 ADMIN INFLUENZA VIRUS VAC: HCPCS | Performed by: NURSE PRACTITIONER

## 2021-03-02 NOTE — PROGRESS NOTES
I N T E R N A L  M E D I C I N E  EVELIA DOBSON      ENCOUNTER DATE:  03/02/2021    Benja Macedo / 80 y.o. / male      CHIEF COMPLAINT / REASON FOR OFFICE VISIT     Establish Care, Labs Only (fasting), and Med Refill      ASSESSMENT & PLAN     Problem List Items Addressed This Visit        Cardiac and Vasculature    Benign essential hypertension    Relevant Medications    lisinopril (PRINIVIL,ZESTRIL) 20 MG tablet    carvedilol (COREG) 6.25 MG tablet    Other Relevant Orders    Comprehensive Metabolic Panel    TSH+Free T4    CBC & Differential    Hyperlipidemia    Relevant Medications    atorvastatin (LIPITOR) 20 MG tablet    Other Relevant Orders    Lipid Panel With / Chol / HDL Ratio       Endocrine and Metabolic    Type 2 diabetes mellitus (CMS/HCC)    Relevant Orders    Hemoglobin A1c    CBC & Differential      Other Visit Diagnoses     Encounter to establish care with new doctor    -  Primary    Need for influenza vaccination        Relevant Orders    Fluad Quad 65+ yrs ([id number]) (Completed)        Orders Placed This Encounter   Procedures   • Fluad Quad 65+ yrs ([id number])   • Comprehensive Metabolic Panel   • Lipid Panel With / Chol / HDL Ratio   • TSH+Free T4   • Hemoglobin A1c   • CBC & Differential     No orders of the defined types were placed in this encounter.      SUMMARY/DISCUSSION  • Patient is to stop hydrochlorothiazide due to dry kidneys and recurrent low blood pressure.  • Patient is to stop Metformin due to current kidney function.  • Follow-up in 6 weeks to recheck CMP and hemoglobin A1c to determine need for initiation of pioglitazone to control diabetes.  · Discussed stopping ibuprofen 800 mg nightly due to kidney function and need for aspirin.  Patient is to stop ibuprofen and reinitiate 81 mg of aspirin nightly.  He is Voltaren gel on right hand for arthritic pain relief along with Tylenol.    Next Appointment with me: 4/13/2021    Return in about 6 weeks (around  "4/13/2021).      VITAL SIGNS     Visit Vitals  /58   Pulse 71   Temp 97.1 °F (36.2 °C) (Temporal)   Ht 177.8 cm (70\")   Wt 82.1 kg (181 lb)   SpO2 98%   BMI 25.97 kg/m²       Wt Readings from Last 3 Encounters:   03/02/21 82.1 kg (181 lb)   08/07/20 92.3 kg (203 lb 6.4 oz)   02/06/20 73.2 kg (161 lb 6.4 oz)     Body mass index is 25.97 kg/m².      MEDICATIONS AT THE TIME OF OFFICE VISIT     Current Outpatient Medications on File Prior to Visit   Medication Sig   • acetaminophen (TYLENOL) 500 MG tablet Take 1,000 mg by mouth Every Night.   • atorvastatin (LIPITOR) 20 MG tablet Take 1 tablet by mouth Daily.   • carvedilol (COREG) 6.25 MG tablet Take 1 tablet by mouth 2 (Two) Times a Day With Meals.   • cetirizine (zyrTEC) 10 MG tablet Take 10 mg by mouth Daily.   • coenzyme Q10 100 MG capsule Take 100 mg by mouth Daily.   • diphenhydrAMINE (BENADRYL) 25 MG tablet Take 25 mg by mouth At Night As Needed for Allergies.   • ibuprofen (ADVIL,MOTRIN) 400 MG tablet Take 400 mg by mouth Every Night.   • lisinopril (PRINIVIL,ZESTRIL) 20 MG tablet TAKE ONE TABLET BY MOUTH DAILY   • Multiple Vitamins-Minerals (MULTIVITAMIN ADULT PO) Take  by mouth Daily.   • vitamin C (ASCORBIC ACID) 500 MG tablet Take 500 mg by mouth Daily.   • [DISCONTINUED] aspirin 81 MG tablet Take  by mouth.   • [DISCONTINUED] hydroCHLOROthiazide (HYDRODIURIL) 12.5 MG tablet TAKE ONE TABLET BY MOUTH EVERY MORNING   • [DISCONTINUED] metFORMIN (GLUCOPHAGE) 500 MG tablet Take 1 tablet by mouth Daily With Breakfast.   • [DISCONTINUED] ranitidine (ZANTAC) 150 MG tablet Take  by mouth.     No current facility-administered medications on file prior to visit.           HISTORY OF PRESENT ILLNESS     Patient presents to establish care with new provider in the office.  Previous patient of Dr. Linton.     Hypertension: Current treatment with carvedilol 6.25 mg twice daily, lisinopril 20 mg tablet daily, and hydrochlorothiazide 12.5 mg.  NP from August 2020 is BUN " of 32, creatinine of 1.76, and GFR 36.  Kidney seems to be dry and patient is susceptible to completely stopping hydrochlorothiazide with blood pressure today at 110/58. Denies any chest pain, shortness of air, headache, visual disturbances, lower extremity leg swelling, or heart palpitations.     Diabetes: Hemoglobin A1c has never reached past 6.5 in the past.  Previous provider placed patient on Metformin 500 mg daily.  Last hemoglobin A1c of 6.1.  Patient was unable to pay for Farxiga with previous PCP suggestion.  With current hemoglobin A1c and kidney function patient agreeable to stop Metformin at this time.  Denies any diabetic neuropathy.    Hyperlipidemia: Well-controlled with atorvastatin 20 mg tablet nightly along with coenzyme every 10 to help with myalgias.  With combination no myalgias.     Arthritis, seen right hand.  Patient currently takes acetaminophen 1000 mg nightly.  He stopped his aspirin due to the initiation of ibuprofen 800 mg nightly.  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  Only history of stroke in right ear and mother.  Heart attack in father.    Patient refusal of colonoscopy or Cologuard.  Restless and benefits of studies.  Still declined.    Patient is currently on a waiting list for COVID-19 vaccinations.  High dose flu vaccination given today.    Need for diabetic eye exam.    REVIEW OF SYSTEMS     Constitutional neg except per HPI   Resp neg  CV neg    PHYSICAL EXAMINATION     Physical Exam  Constitutional  No distress  Cardiovascular Rate  normal . Rhythm: regular . Heart sounds:  normal  Pulmonary/Chest  Effort normal. Breath sounds:  normal  Psychiatric  Alert. Judgment and thought content normal. Mood normal     REVIEWED DATA     Labs:   Lab Results   Component Value Date    BUN 32 (H) 08/07/2020    CREATININE 1.76 (H) 08/07/2020    EGFRIFNONA 36 (L) 08/07/2020    EGFRIFAFRI 42 (L) 08/07/2020    BCR 18 08/07/2020    K 4.9 08/07/2020    CO2  19 (L) 08/07/2020    CALCIUM 9.9 08/07/2020    PROTENTOTREF 7.4 08/07/2020    ALBUMIN 4.7 08/07/2020    LABIL2 1.7 08/07/2020    AST 15 08/07/2020    ALT 22 08/07/2020     Lab Results   Component Value Date    HGBA1C 6.1 (H) 08/07/2020     Lab Results   Component Value Date    CHLPL 148 08/07/2020    TRIG 116 08/07/2020    HDL 37 (L) 08/07/2020    LDL 88 08/07/2020         Imaging:           Medical Tests:           Summary of old records / correspondence / consultant report:           Request outside records:             *Examiner was wearing medical surgical mask, face shield and exam gloves during the entire duration of the visit. Patient was masked the entire time.   Minimum social distance of 6 ft maintained entire visit except if physical contact was necessary as documented.     **Dragon Disclaimer:   Much of this encounter note is an electronic transcription/translation of spoken language to printed text. The electronic translation of spoken language may permit erroneous, or at times, nonsensical words or phrases to be inadvertently transcribed. Although I have reviewed the note for such errors, some may still exist.

## 2021-03-03 LAB
ALBUMIN SERPL-MCNC: 4.5 G/DL (ref 3.7–4.7)
ALBUMIN/GLOB SERPL: 1.7 {RATIO} (ref 1.2–2.2)
ALP SERPL-CCNC: 86 IU/L (ref 39–117)
ALT SERPL-CCNC: 22 IU/L (ref 0–44)
AST SERPL-CCNC: 12 IU/L (ref 0–40)
BASOPHILS # BLD AUTO: 0 X10E3/UL (ref 0–0.2)
BASOPHILS NFR BLD AUTO: 0 %
BILIRUB SERPL-MCNC: 0.4 MG/DL (ref 0–1.2)
BUN SERPL-MCNC: 39 MG/DL (ref 8–27)
BUN/CREAT SERPL: 18 (ref 10–24)
CALCIUM SERPL-MCNC: 10.3 MG/DL (ref 8.6–10.2)
CHLORIDE SERPL-SCNC: 105 MMOL/L (ref 96–106)
CHOLEST SERPL-MCNC: 152 MG/DL (ref 100–199)
CHOLEST/HDLC SERPL: 4.8 RATIO (ref 0–5)
CO2 SERPL-SCNC: 20 MMOL/L (ref 20–29)
CREAT SERPL-MCNC: 2.21 MG/DL (ref 0.76–1.27)
EOSINOPHIL # BLD AUTO: 0.2 X10E3/UL (ref 0–0.4)
EOSINOPHIL NFR BLD AUTO: 3 %
ERYTHROCYTE [DISTWIDTH] IN BLOOD BY AUTOMATED COUNT: 11.7 % (ref 11.6–15.4)
GLOBULIN SER CALC-MCNC: 2.7 G/DL (ref 1.5–4.5)
GLUCOSE SERPL-MCNC: 96 MG/DL (ref 65–99)
HBA1C MFR BLD: 6.1 % (ref 4.8–5.6)
HCT VFR BLD AUTO: 39 % (ref 37.5–51)
HDLC SERPL-MCNC: 32 MG/DL
HGB BLD-MCNC: 12.8 G/DL (ref 13–17.7)
IMM GRANULOCYTES # BLD AUTO: 0 X10E3/UL (ref 0–0.1)
IMM GRANULOCYTES NFR BLD AUTO: 0 %
LDLC SERPL CALC-MCNC: 83 MG/DL (ref 0–99)
LYMPHOCYTES # BLD AUTO: 1.4 X10E3/UL (ref 0.7–3.1)
LYMPHOCYTES NFR BLD AUTO: 17 %
MCH RBC QN AUTO: 32.7 PG (ref 26.6–33)
MCHC RBC AUTO-ENTMCNC: 32.8 G/DL (ref 31.5–35.7)
MCV RBC AUTO: 100 FL (ref 79–97)
MONOCYTES # BLD AUTO: 0.5 X10E3/UL (ref 0.1–0.9)
MONOCYTES NFR BLD AUTO: 6 %
NEUTROPHILS # BLD AUTO: 6 X10E3/UL (ref 1.4–7)
NEUTROPHILS NFR BLD AUTO: 74 %
PLATELET # BLD AUTO: 290 X10E3/UL (ref 150–450)
POTASSIUM SERPL-SCNC: 5.7 MMOL/L (ref 3.5–5.2)
PROT SERPL-MCNC: 7.2 G/DL (ref 6–8.5)
RBC # BLD AUTO: 3.92 X10E6/UL (ref 4.14–5.8)
SODIUM SERPL-SCNC: 140 MMOL/L (ref 134–144)
T4 FREE SERPL-MCNC: 1.25 NG/DL (ref 0.82–1.77)
TRIGL SERPL-MCNC: 220 MG/DL (ref 0–149)
TSH SERPL DL<=0.005 MIU/L-ACNC: 1.43 UIU/ML (ref 0.45–4.5)
VLDLC SERPL CALC-MCNC: 37 MG/DL (ref 5–40)
WBC # BLD AUTO: 8.2 X10E3/UL (ref 3.4–10.8)

## 2021-03-04 ENCOUNTER — TELEPHONE (OUTPATIENT)
Dept: INTERNAL MEDICINE | Age: 81
End: 2021-03-04

## 2021-03-04 DIAGNOSIS — E87.5 HYPERKALEMIA: Primary | ICD-10-CM

## 2021-03-04 NOTE — PROGRESS NOTES
Sister in Law Renee notified of APRN dictation. She made pt appt for f/u lab work. Renee verbalized understanding. MM

## 2021-03-21 ENCOUNTER — IMMUNIZATION (OUTPATIENT)
Dept: VACCINE CLINIC | Facility: HOSPITAL | Age: 81
End: 2021-03-21

## 2021-03-21 PROCEDURE — 91300 HC SARSCOV02 VAC 30MCG/0.3ML IM: CPT | Performed by: INTERNAL MEDICINE

## 2021-03-21 PROCEDURE — 0001A: CPT | Performed by: INTERNAL MEDICINE

## 2021-03-22 DIAGNOSIS — E78.2 MIXED HYPERLIPIDEMIA: ICD-10-CM

## 2021-03-22 RX ORDER — ATORVASTATIN CALCIUM 20 MG/1
TABLET, FILM COATED ORAL
Qty: 30 TABLET | Refills: 5 | Status: SHIPPED | OUTPATIENT
Start: 2021-03-22 | End: 2021-09-27

## 2021-04-11 ENCOUNTER — IMMUNIZATION (OUTPATIENT)
Dept: VACCINE CLINIC | Facility: HOSPITAL | Age: 81
End: 2021-04-11

## 2021-04-11 PROCEDURE — 91300 HC SARSCOV02 VAC 30MCG/0.3ML IM: CPT | Performed by: INTERNAL MEDICINE

## 2021-04-11 PROCEDURE — 0002A: CPT | Performed by: INTERNAL MEDICINE

## 2021-04-13 ENCOUNTER — OFFICE VISIT (OUTPATIENT)
Dept: INTERNAL MEDICINE | Age: 81
End: 2021-04-13

## 2021-04-13 VITALS
BODY MASS INDEX: 25.8 KG/M2 | SYSTOLIC BLOOD PRESSURE: 120 MMHG | TEMPERATURE: 97.1 F | DIASTOLIC BLOOD PRESSURE: 60 MMHG | HEART RATE: 69 BPM | WEIGHT: 180.2 LBS | HEIGHT: 70 IN | OXYGEN SATURATION: 99 %

## 2021-04-13 DIAGNOSIS — N28.9 RENAL INSUFFICIENCY: Primary | ICD-10-CM

## 2021-04-13 DIAGNOSIS — E11.9 TYPE 2 DIABETES MELLITUS WITHOUT COMPLICATION, WITHOUT LONG-TERM CURRENT USE OF INSULIN (HCC): ICD-10-CM

## 2021-04-13 DIAGNOSIS — I10 BENIGN ESSENTIAL HYPERTENSION: ICD-10-CM

## 2021-04-13 LAB
ALBUMIN SERPL-MCNC: 4.5 G/DL (ref 3.5–5.2)
ALBUMIN/GLOB SERPL: 1.8 G/DL
ALP SERPL-CCNC: 105 U/L (ref 39–117)
ALT SERPL-CCNC: 28 U/L (ref 1–41)
AST SERPL-CCNC: 15 U/L (ref 1–40)
BILIRUB SERPL-MCNC: 0.4 MG/DL (ref 0–1.2)
BUN SERPL-MCNC: 41 MG/DL (ref 8–23)
BUN/CREAT SERPL: 18.3 (ref 7–25)
CALCIUM SERPL-MCNC: 10.1 MG/DL (ref 8.6–10.5)
CHLORIDE SERPL-SCNC: 103 MMOL/L (ref 98–107)
CO2 SERPL-SCNC: 25.3 MMOL/L (ref 22–29)
CREAT SERPL-MCNC: 2.24 MG/DL (ref 0.76–1.27)
GLOBULIN SER CALC-MCNC: 2.5 GM/DL
GLUCOSE SERPL-MCNC: 117 MG/DL (ref 65–99)
HBA1C MFR BLD: 6 % (ref 4.8–5.6)
POTASSIUM SERPL-SCNC: 5.3 MMOL/L (ref 3.5–5.2)
PROT SERPL-MCNC: 7 G/DL (ref 6–8.5)
SODIUM SERPL-SCNC: 139 MMOL/L (ref 136–145)

## 2021-04-13 PROCEDURE — 99214 OFFICE O/P EST MOD 30 MIN: CPT | Performed by: NURSE PRACTITIONER

## 2021-04-13 NOTE — PROGRESS NOTES
"    I N T E R N A L  M E D I C I N E  CESAR BRWON, EVELIA      ENCOUNTER DATE:  04/13/2021    Benja Macedo / 80 y.o. / male      CHIEF COMPLAINT / REASON FOR OFFICE VISIT     Hypertension (3 wk f/u)      ASSESSMENT & PLAN     1. Renal insufficiency  - Continue off NSAIDs as tolerated   - Comprehensive Metabolic Panel    2. Benign essential hypertension  - Monitor blood pressure twice daily and send readings via mychart In one week   - Instructed to completely STOP HCTZ   - Continue lisinopril 20mg tablet   - Comprehensive Metabolic Panel    3. Type 2 diabetes mellitus without complication, without long-term current use of insulin (CMS/[facility])  - Continue off metformin. May consider different class of medication based on kidney function today  - Hemoglobin A1c    Orders Placed This Encounter   Procedures   • Comprehensive Metabolic Panel   • Hemoglobin A1c     No orders of the defined types were placed in this encounter.      SUMMARY/DISCUSSION  • Follow-up in 4 months for chronic medical follow-up, earlier pending lab results today     Next Appointment with me: 8/13/2021    Return in about 4 months (around 8/13/2021) for Next scheduled follow up.      VITAL SIGNS     Visit Vitals  /60   Pulse 69   Temp 97.1 °F (36.2 °C) (Temporal)   Ht 177.8 cm (70\")   Wt 81.7 kg (180 lb 3.2 oz)   SpO2 99%   BMI 25.86 kg/m²     Wt Readings from Last 3 Encounters:   04/13/21 81.7 kg (180 lb 3.2 oz)   03/02/21 82.1 kg (181 lb)   08/07/20 92.3 kg (203 lb 6.4 oz)     Body mass index is 25.86 kg/m².      MEDICATIONS AT THE TIME OF OFFICE VISIT     Current Outpatient Medications on File Prior to Visit   Medication Sig   • acetaminophen (TYLENOL) 500 MG tablet Take 1,000 mg by mouth At Night As Needed.   • atorvastatin (LIPITOR) 20 MG tablet TAKE ONE TABLET BY MOUTH DAILY   • carvedilol (COREG) 6.25 MG tablet Take 1 tablet by mouth 2 (Two) Times a Day With Meals.   • cetirizine (zyrTEC) 10 MG tablet Take 10 mg by mouth Daily.   • " coenzyme Q10 100 MG capsule Take 100 mg by mouth Daily.   • diphenhydrAMINE (BENADRYL) 25 MG tablet Take 25 mg by mouth At Night As Needed for Allergies.   • lisinopril (PRINIVIL,ZESTRIL) 20 MG tablet TAKE ONE TABLET BY MOUTH DAILY   • Multiple Vitamins-Minerals (MULTIVITAMIN ADULT PO) Take  by mouth Daily.   • vitamin C (ASCORBIC ACID) 500 MG tablet Take 500 mg by mouth Daily.   • ibuprofen (ADVIL,MOTRIN) 400 MG tablet Take 400 mg by mouth Every Night.     No current facility-administered medications on file prior to visit.         HISTORY OF PRESENT ILLNESS     Presents for follow up on DM, HTN, and renal insufficiency.     DMII: Patient did stop metformin. Does not take blood sugars at home. Has never tried any other class of diabetes medications. Last HA1c 03/02 6.1.     Hypertension: With gfr 34, Cr 1.90 AND bun 29, asked patient to stop HCTZ 12.5mg.. Has been taking half tablet. Blood pressures have ranged from 110s-140s/60-80s. Denies any chest pain, heart palpitations, or leg swelling.     Renal insufficiency: Has discontinued ibuprofen 800mg nightly and started aspirin 81mg daily.     REVIEW OF SYSTEMS     Constitutional neg except per HPI   Resp neg  CV neg    PHYSICAL EXAMINATION     Physical Exam  Constitutional  No distress  Cardiovascular Rate  normal . Rhythm: regular . Heart sounds:  normal  Pulmonary/Chest  Effort normal. Breath sounds:  normal  Psychiatric  Alert. Judgment and thought content normal. Mood normal     REVIEWED DATA     Labs:   Lab Results   Component Value Date    BUN 29 (H) 03/18/2021    CREATININE 1.90 (H) 03/18/2021    EGFRIFNONA 34 (L) 03/18/2021    EGFRIFAFRI 42 (L) 03/18/2021    BCR 15.3 03/18/2021    K 4.9 03/18/2021    CO2 25.6 03/18/2021    CALCIUM 10.3 03/18/2021    PROTENTOTREF 7.0 03/18/2021    ALBUMIN 4.60 03/18/2021    LABIL2 1.9 03/18/2021    AST 19 03/18/2021    ALT 29 03/18/2021     Imaging:           Medical Tests:             Summary of old records / correspondence  / consultant report:           Request outside records:           *Examiner was wearing medical surgical mask, face shield and exam gloves during the entire duration of the visit. Patient was masked the entire time.   Minimum social distance of 6 ft maintained entire visit except if physical contact was necessary as documented.     **Dragon Disclaimer:   Much of this encounter note is an electronic transcription/translation of spoken language to printed text. The electronic translation of spoken language may permit erroneous, or at times, nonsensical words or phrases to be inadvertently transcribed. Although I have reviewed the note for such errors, some may still exist.

## 2021-04-14 DIAGNOSIS — N18.9 CHRONIC KIDNEY DISEASE, UNSPECIFIED CKD STAGE: ICD-10-CM

## 2021-04-14 DIAGNOSIS — E87.5 HYPERKALEMIA: Primary | ICD-10-CM

## 2021-04-15 ENCOUNTER — TELEPHONE (OUTPATIENT)
Dept: INTERNAL MEDICINE | Age: 81
End: 2021-04-15

## 2021-04-15 NOTE — TELEPHONE ENCOUNTER
----- Message from EVELIA Rosario sent at 4/14/2021  3:39 PM EDT -----  Please call patient.     Hemoglobin A1c has actually decreased from 6.1 to 6.0. Continue off metformin at this time.     Kidney function has actually decreased from 3 weeks prior even with removal of metformin and 1/2 hydrochlorothiazide. Please make sure you are still not taking ibuprofen or any NSAIDs. Make sure you have completely stopped hydrochlorothiazide.     I would like to send you to nephrology for additional work-up for renal insufficiency, since we have mad modifications, but kidney function is still markedly decreased.     In the meantime potassium is elevated. Strict low potassium diet. Recheck potassium in 2 weeks. We may have to modify your medication.

## 2021-04-15 NOTE — TELEPHONE ENCOUNTER
Caller: Sendy Borrero    Relationship: Emergency Contact    Best call back number: [phone] (H)    What is the best time to reach you: ANYTIME    Who are you requesting to speak with (clinical staff, provider,  specific staff member): CLINICAL STAFF    Do you know the name of the person who called: SENDY BORRERO    What was the call regarding: PATIENT'S SISTER IN LAW (ON [employer]) CALLED BECAUSE SHE RECEIVED A MESSAGE FROM [facility] STATING CESAR HAD REFERRED PATIENT AND NEEDS TO KNOW WHAT THE REFERRAL IS FOR BECAUSE THEY WEREN'T NOTIFIED    Do you require a callback: YES, ASAP

## 2021-04-16 NOTE — TELEPHONE ENCOUNTER
I spoke with Sendy and read Yo's results to her. She understood and will call the Nephrology office back to get the patient scheduled.       Appointment set for BMP repeat on Thursday April 29.

## 2021-04-19 RX ORDER — CARVEDILOL 6.25 MG/1
TABLET ORAL
Qty: 180 TABLET | Refills: 1 | Status: SHIPPED | OUTPATIENT
Start: 2021-04-19 | End: 2021-10-25

## 2021-08-31 ENCOUNTER — OFFICE VISIT (OUTPATIENT)
Dept: INTERNAL MEDICINE | Age: 81
End: 2021-08-31

## 2021-08-31 VITALS
SYSTOLIC BLOOD PRESSURE: 148 MMHG | DIASTOLIC BLOOD PRESSURE: 76 MMHG | HEART RATE: 77 BPM | TEMPERATURE: 97.7 F | WEIGHT: 209.6 LBS | OXYGEN SATURATION: 98 % | BODY MASS INDEX: 30.01 KG/M2 | HEIGHT: 70 IN

## 2021-08-31 DIAGNOSIS — N18.32 ANEMIA DUE TO STAGE 3B CHRONIC KIDNEY DISEASE (HCC): Primary | ICD-10-CM

## 2021-08-31 DIAGNOSIS — I10 BENIGN ESSENTIAL HYPERTENSION: ICD-10-CM

## 2021-08-31 DIAGNOSIS — R60.0 BILATERAL LOWER EXTREMITY EDEMA: ICD-10-CM

## 2021-08-31 DIAGNOSIS — D63.1 ANEMIA DUE TO STAGE 3B CHRONIC KIDNEY DISEASE (HCC): Primary | ICD-10-CM

## 2021-08-31 DIAGNOSIS — E11.9 TYPE 2 DIABETES MELLITUS WITHOUT COMPLICATION, WITHOUT LONG-TERM CURRENT USE OF INSULIN (HCC): ICD-10-CM

## 2021-08-31 DIAGNOSIS — E78.2 MIXED HYPERLIPIDEMIA: ICD-10-CM

## 2021-08-31 PROCEDURE — 99214 OFFICE O/P EST MOD 30 MIN: CPT | Performed by: NURSE PRACTITIONER

## 2021-08-31 RX ORDER — AMOXICILLIN 500 MG
1200 CAPSULE ORAL DAILY
COMMUNITY
End: 2022-05-17

## 2021-08-31 RX ORDER — HYDRALAZINE HYDROCHLORIDE 10 MG/1
10 TABLET, FILM COATED ORAL 2 TIMES DAILY
COMMUNITY
End: 2022-05-17

## 2021-08-31 RX ORDER — FUROSEMIDE 20 MG/1
20 TABLET ORAL DAILY
Qty: 30 TABLET | Refills: 0 | Status: SHIPPED | OUTPATIENT
Start: 2021-08-31 | End: 2021-09-27

## 2021-08-31 NOTE — PROGRESS NOTES
"    I N T E R N A L  M E D I C I N E  CESAR BROWN, APRN      ENCOUNTER DATE:  08/31/2021    Benja Macedo / 80 y.o. / male      CHIEF COMPLAINT / REASON FOR OFFICE VISIT     Chronic Kidney Disease (4 month f/u), Hypertension, and Hyperlipidemia      ASSESSMENT & PLAN     1. Anemia due to stage 3b chronic kidney disease (CMS/[facility])  - CBC & Differential    2. Type 2 diabetes mellitus without complication, without long-term current use of insulin (CMS/[facility])  -Last hemoglobin A1c 6.0, hold metformin for now  - Hemoglobin A1c    3. Mixed hyperlipidemia  -10 you atorvastatin 20 mg daily  - Lipid Panel With / Chol / HDL Ratio    4. Benign essential hypertension  -Continue hydralazine 10 mg daily  -Continue carvedilol 6.25 mg twice daily  -Need to hold lisinopril until seen by nephrology  - Comprehensive Metabolic Panel  - TSH+Free T4    5.  Bilateral lower extremity edema  -Start Lasix at lowest dose of 20 mg decreased mobility due to +2 bilateral lower extremity edema  -Follow-up in 1 week for BMP recheck  -Call nephrology office to see if he can be evaluated before November    Orders Placed This Encounter   Procedures   • Comprehensive Metabolic Panel   • Hemoglobin A1c   • Lipid Panel With / Chol / HDL Ratio   • TSH+Free T4   • CBC & Differential     New Medications Ordered This Visit   Medications   • furosemide (Lasix) 20 MG tablet     Sig: Take 1 tablet by mouth Daily.     Dispense:  30 tablet     Refill:  0       SUMMARY/DISCUSSION  • Follow-up in 1 week with bilateral lower extremity edema; 4 months for chronic medical management    Next Appointment with me: Visit date not found    Return in about 1 week (around 9/7/2021) for Next scheduled follow up; Also schedule 4 month .      VITAL SIGNS     Visit Vitals  /76   Pulse 77   Temp 97.7 °F (36.5 °C) (Temporal)   Ht 177.8 cm (70\")   Wt 95.1 kg (209 lb 9.6 oz)   SpO2 98%   BMI 30.07 kg/m²       Wt Readings from Last 3 Encounters:   08/31/21 95.1 kg (209 lb " 9.6 oz)   04/13/21 81.7 kg (180 lb 3.2 oz)   03/02/21 82.1 kg (181 lb)     Body mass index is 30.07 kg/m².      MEDICATIONS AT THE TIME OF OFFICE VISIT     Current Outpatient Medications on File Prior to Visit   Medication Sig   • acetaminophen (TYLENOL) 500 MG tablet Take 1,000 mg by mouth At Night As Needed.   • ASPIRIN 81 PO Take 81 mg by mouth Daily.   • atorvastatin (LIPITOR) 20 MG tablet TAKE ONE TABLET BY MOUTH DAILY   • carvedilol (COREG) 6.25 MG tablet TAKE ONE TABLET BY MOUTH TWICE A DAY WITH MEALS   • cetirizine (zyrTEC) 10 MG tablet Take 10 mg by mouth Daily.   • coenzyme Q10 100 MG capsule Take 100 mg by mouth Daily.   • diphenhydrAMINE (BENADRYL) 25 MG tablet Take 25 mg by mouth At Night As Needed for Allergies.   • hydrALAZINE (APRESOLINE) 10 MG tablet Take 10 mg by mouth 2 (two) times a day.   • Multiple Vitamins-Minerals (MULTIVITAMIN ADULT PO) Take  by mouth Daily.   • Omega-3 Fatty Acids (fish oil) 1200 MG capsule capsule Take 1,200 mg by mouth Daily.   • vitamin C (ASCORBIC ACID) 500 MG tablet Take 500 mg by mouth Daily.   • ibuprofen (ADVIL,MOTRIN) 400 MG tablet Take 400 mg by mouth Every Night.   • lisinopril (PRINIVIL,ZESTRIL) 20 MG tablet TAKE ONE TABLET BY MOUTH DAILY     No current facility-administered medications on file prior to visit.         HISTORY OF PRESENT ILLNESS     Patient presents for 4-month follow-up on type 2 diabetes, hyperlipidemia, hypertension and anemia.     Hyperlipidemia: Atorvastatin 20 mg well-controlled cholesterol but elevation in triglycerides in the 200s.  No myalgias with medication.    Diabetes mellitus type II: Deviously on Metformin but last hemoglobin A1c 6.0.  Holding on Metformin.  No polyuria, polydipsia or peripheral neuropathy.    Hypertension: Is now following nephrology.  Have stopped both hydrochlorothiazide and lisinopril due to acute kidney injury.  Considering restarting lisinopril if needed.  Blood pressure elevated today in the high 140s over  70s.  According to patient who very rarely checks his blood pressure this is consistent.  Denies any chest pain, heart palpitations or shortness of air.  He does have worsening bilateral lower extremity edema which is impairing his range of motion.  Ariela member accompanying patient states that nephrology did consider low-dose Lasix at last visit.  He is continuing on his hydralazine 10 mg twice daily for now.  Kidney function is slowly improving with ibuprofen discontinuing.  However, he is still stage III chronic kidney disease.    Patient Clines all immunizations today along with urine sample.  Needs that he will obtain diabetic eye exam.  Declines Cologuard or colonoscopy.    REVIEW OF SYSTEMS     Constitutional neg except per HPI   Resp neg  CV neg  Musc bilateral lower extremity swelling    PHYSICAL EXAMINATION     Physical Exam  Constitutional  No distress  Cardiovascular Rate  normal . Rhythm: regular . Heart sounds:  normal  Pulmonary/Chest  Effort normal. Breath sounds:  normal  Musc +2 bilateral lower extremity edema  Psychiatric  Alert. Judgment and thought content normal. Mood normal     REVIEWED DATA     Labs:   Lab Results   Component Value Date    CHLPL 152 03/02/2021    TRIG 220 (H) 03/02/2021    HDL 32 (L) 03/02/2021    LDL 83 03/02/2021     Lab Results   Component Value Date    HGBA1C 6.00 (H) 04/13/2021     Lab Results   Component Value Date    WBC 8.2 03/02/2021    HGB 12.8 (L) 03/02/2021    HCT 39.0 03/02/2021     (H) 03/02/2021     03/02/2021     Lab Results   Component Value Date    BUN 41 (H) 04/13/2021    CREATININE 2.24 (H) 04/13/2021    EGFRIFNONA 28 (L) 04/13/2021    EGFRIFAFRI 35 (L) 04/13/2021    BCR 18.3 04/13/2021    K 5.3 (H) 04/13/2021    CO2 25.3 04/13/2021    CALCIUM 10.1 04/13/2021    PROTENTOTREF 7.0 04/13/2021    ALBUMIN 4.50 04/13/2021    LABIL2 1.8 04/13/2021    AST 15 04/13/2021    ALT 28 04/13/2021         Imaging:           Medical Tests:             Summary  of old records / correspondence / consultant report:           Request outside records:           *Examiner was wearing medical surgical mask, face shield and exam gloves during the entire duration of the visit. Patient was masked the entire time.   Minimum social distance of 6 ft maintained entire visit except if physical contact was necessary as documented.     **Dragon Disclaimer:   Much of this encounter note is an electronic transcription/translation of spoken language to printed text. The electronic translation of spoken language may permit erroneous, or at times, nonsensical words or phrases to be inadvertently transcribed. Although I have reviewed the note for such errors, some may still exist.

## 2021-09-01 LAB
ALBUMIN SERPL-MCNC: 4.8 G/DL (ref 3.5–5.2)
ALBUMIN/GLOB SERPL: 2 G/DL
ALP SERPL-CCNC: 93 U/L (ref 39–117)
ALT SERPL-CCNC: 32 U/L (ref 1–41)
AST SERPL-CCNC: 18 U/L (ref 1–40)
BASOPHILS # BLD AUTO: 0.04 10*3/MM3 (ref 0–0.2)
BASOPHILS NFR BLD AUTO: 0.6 % (ref 0–1.5)
BILIRUB SERPL-MCNC: 0.5 MG/DL (ref 0–1.2)
BUN SERPL-MCNC: 27 MG/DL (ref 8–23)
BUN/CREAT SERPL: 16.2 (ref 7–25)
CALCIUM SERPL-MCNC: 9.9 MG/DL (ref 8.6–10.5)
CHLORIDE SERPL-SCNC: 105 MMOL/L (ref 98–107)
CHOLEST SERPL-MCNC: 150 MG/DL (ref 0–200)
CHOLEST/HDLC SERPL: 3.85 {RATIO}
CO2 SERPL-SCNC: 23.7 MMOL/L (ref 22–29)
CREAT SERPL-MCNC: 1.67 MG/DL (ref 0.76–1.27)
EOSINOPHIL # BLD AUTO: 0.19 10*3/MM3 (ref 0–0.4)
EOSINOPHIL NFR BLD AUTO: 3 % (ref 0.3–6.2)
ERYTHROCYTE [DISTWIDTH] IN BLOOD BY AUTOMATED COUNT: 12.6 % (ref 12.3–15.4)
GLOBULIN SER CALC-MCNC: 2.4 GM/DL
GLUCOSE SERPL-MCNC: 107 MG/DL (ref 65–99)
HBA1C MFR BLD: 6.3 % (ref 4.8–5.6)
HCT VFR BLD AUTO: 45.9 % (ref 37.5–51)
HDLC SERPL-MCNC: 39 MG/DL (ref 40–60)
HGB BLD-MCNC: 14.6 G/DL (ref 13–17.7)
IMM GRANULOCYTES # BLD AUTO: 0.02 10*3/MM3 (ref 0–0.05)
IMM GRANULOCYTES NFR BLD AUTO: 0.3 % (ref 0–0.5)
LDLC SERPL CALC-MCNC: 87 MG/DL (ref 0–100)
LYMPHOCYTES # BLD AUTO: 1.39 10*3/MM3 (ref 0.7–3.1)
LYMPHOCYTES NFR BLD AUTO: 22.3 % (ref 19.6–45.3)
MCH RBC QN AUTO: 31.7 PG (ref 26.6–33)
MCHC RBC AUTO-ENTMCNC: 31.8 G/DL (ref 31.5–35.7)
MCV RBC AUTO: 99.6 FL (ref 79–97)
MONOCYTES # BLD AUTO: 0.59 10*3/MM3 (ref 0.1–0.9)
MONOCYTES NFR BLD AUTO: 9.5 % (ref 5–12)
NEUTROPHILS # BLD AUTO: 4 10*3/MM3 (ref 1.7–7)
NEUTROPHILS NFR BLD AUTO: 64.3 % (ref 42.7–76)
NRBC BLD AUTO-RTO: 0 /100 WBC (ref 0–0.2)
PLATELET # BLD AUTO: 260 10*3/MM3 (ref 140–450)
POTASSIUM SERPL-SCNC: 4.8 MMOL/L (ref 3.5–5.2)
PROT SERPL-MCNC: 7.2 G/DL (ref 6–8.5)
RBC # BLD AUTO: 4.61 10*6/MM3 (ref 4.14–5.8)
SODIUM SERPL-SCNC: 142 MMOL/L (ref 136–145)
T4 FREE SERPL-MCNC: 1.34 NG/DL (ref 0.93–1.7)
TRIGL SERPL-MCNC: 136 MG/DL (ref 0–150)
TSH SERPL DL<=0.005 MIU/L-ACNC: 1.2 UIU/ML (ref 0.27–4.2)
VLDLC SERPL CALC-MCNC: 24 MG/DL (ref 5–40)
WBC # BLD AUTO: 6.23 10*3/MM3 (ref 3.4–10.8)

## 2021-09-25 DIAGNOSIS — E78.2 MIXED HYPERLIPIDEMIA: ICD-10-CM

## 2021-09-27 RX ORDER — ATORVASTATIN CALCIUM 20 MG/1
TABLET, FILM COATED ORAL
Qty: 30 TABLET | Refills: 5 | Status: SHIPPED | OUTPATIENT
Start: 2021-09-27 | End: 2022-03-23

## 2021-09-27 RX ORDER — FUROSEMIDE 20 MG/1
TABLET ORAL
Qty: 30 TABLET | Refills: 0 | Status: SHIPPED | OUTPATIENT
Start: 2021-09-27 | End: 2022-05-17

## 2021-10-20 ENCOUNTER — APPOINTMENT (OUTPATIENT)
Dept: VACCINE CLINIC | Facility: HOSPITAL | Age: 81
End: 2021-10-20

## 2021-10-25 RX ORDER — CARVEDILOL 6.25 MG/1
TABLET ORAL
Qty: 180 TABLET | Refills: 3 | Status: SHIPPED | OUTPATIENT
Start: 2021-10-25 | End: 2022-10-25

## 2021-11-23 ENCOUNTER — IMMUNIZATION (OUTPATIENT)
Dept: VACCINE CLINIC | Facility: HOSPITAL | Age: 81
End: 2021-11-23

## 2021-11-23 PROCEDURE — 0004A ADM SARSCOV2 30MCG/0.3ML BOOSTER: CPT | Performed by: INTERNAL MEDICINE

## 2021-11-23 PROCEDURE — 91300 HC SARSCOV02 VAC 30MCG/0.3ML IM: CPT | Performed by: INTERNAL MEDICINE

## 2022-03-23 DIAGNOSIS — E78.2 MIXED HYPERLIPIDEMIA: ICD-10-CM

## 2022-03-23 RX ORDER — ATORVASTATIN CALCIUM 20 MG/1
20 TABLET, FILM COATED ORAL DAILY
Qty: 30 TABLET | Refills: 0 | Status: SHIPPED | OUTPATIENT
Start: 2022-03-23 | End: 2022-04-25

## 2022-04-23 DIAGNOSIS — E78.2 MIXED HYPERLIPIDEMIA: ICD-10-CM

## 2022-04-25 RX ORDER — ATORVASTATIN CALCIUM 20 MG/1
20 TABLET, FILM COATED ORAL DAILY
Qty: 30 TABLET | Refills: 0 | Status: SHIPPED | OUTPATIENT
Start: 2022-04-25 | End: 2022-05-17 | Stop reason: SDUPTHER

## 2022-04-25 NOTE — TELEPHONE ENCOUNTER
Spoke c Brother Art, verified on [facility]. Advised on 20 tabs of med would be given. Pt needs appt for further refills. Art stated he will pass along to pt to see what he wants to do. MM

## 2022-05-17 ENCOUNTER — OFFICE VISIT (OUTPATIENT)
Dept: INTERNAL MEDICINE | Age: 82
End: 2022-05-17

## 2022-05-17 VITALS
WEIGHT: 183 LBS | HEART RATE: 74 BPM | BODY MASS INDEX: 26.2 KG/M2 | OXYGEN SATURATION: 99 % | TEMPERATURE: 97.5 F | HEIGHT: 70 IN | SYSTOLIC BLOOD PRESSURE: 118 MMHG | DIASTOLIC BLOOD PRESSURE: 78 MMHG

## 2022-05-17 DIAGNOSIS — N18.32 STAGE 3B CHRONIC KIDNEY DISEASE: ICD-10-CM

## 2022-05-17 DIAGNOSIS — Z91.81 AT MODERATE RISK FOR FALL: ICD-10-CM

## 2022-05-17 DIAGNOSIS — Z12.5 PROSTATE CANCER SCREENING: ICD-10-CM

## 2022-05-17 DIAGNOSIS — C44.601 MALIGNANT NEOPLASM OF SKIN OF UPPER EXTREMITY, UNSPECIFIED LATERALITY: ICD-10-CM

## 2022-05-17 DIAGNOSIS — I10 BENIGN ESSENTIAL HYPERTENSION: ICD-10-CM

## 2022-05-17 DIAGNOSIS — K21.9 GASTROESOPHAGEAL REFLUX DISEASE WITHOUT ESOPHAGITIS: ICD-10-CM

## 2022-05-17 DIAGNOSIS — R60.0 EDEMA OF LOWER EXTREMITY: ICD-10-CM

## 2022-05-17 DIAGNOSIS — E11.9 TYPE 2 DIABETES MELLITUS WITHOUT COMPLICATION, WITHOUT LONG-TERM CURRENT USE OF INSULIN: Primary | ICD-10-CM

## 2022-05-17 DIAGNOSIS — E78.2 MIXED HYPERLIPIDEMIA: ICD-10-CM

## 2022-05-17 PROBLEM — I12.9 BENIGN HYPERTENSION WITH CHRONIC KIDNEY DISEASE: Status: ACTIVE | Noted: 2021-11-23

## 2022-05-17 PROCEDURE — 99397 PER PM REEVAL EST PAT 65+ YR: CPT | Performed by: NURSE PRACTITIONER

## 2022-05-17 PROCEDURE — 99214 OFFICE O/P EST MOD 30 MIN: CPT | Performed by: NURSE PRACTITIONER

## 2022-05-17 RX ORDER — LISINOPRIL AND HYDROCHLOROTHIAZIDE 20; 12.5 MG/1; MG/1
1 TABLET ORAL
COMMUNITY
Start: 2021-11-23 | End: 2022-05-17 | Stop reason: ALTCHOICE

## 2022-05-17 RX ORDER — FLUTICASONE PROPIONATE 50 MCG
2 SPRAY, SUSPENSION (ML) NASAL DAILY PRN
COMMUNITY

## 2022-05-17 RX ORDER — ATORVASTATIN CALCIUM 20 MG/1
20 TABLET, FILM COATED ORAL DAILY
Qty: 90 TABLET | Refills: 0 | Status: SHIPPED | OUTPATIENT
Start: 2022-05-17 | End: 2022-05-31

## 2022-05-17 NOTE — PROGRESS NOTES
"    I N T E R N A L  M E D I C I N E  Bianca Virk, APRN       ENCOUNTER DATE:  05/17/2022    Benja Macedo / 81 y.o. / male    CHIEF COMPLAINT     Visit for follow up on chronic conditions including Hypertension, Hyperlipidemia, GERD, and Diabetes      VITALS     Visit Vitals  /78   Pulse 74   Temp 97.5 °F (36.4 °C) (Temporal)   Ht 177.8 cm (70\")   Wt 83 kg (183 lb)   SpO2 99%   BMI 26.26 kg/m²       BP Readings from Last 3 Encounters:   05/17/22 118/78   08/31/21 148/76   04/13/21 120/60     Wt Readings from Last 3 Encounters:   05/17/22 83 kg (183 lb)   08/31/21 95.1 kg (209 lb 9.6 oz)   04/13/21 81.7 kg (180 lb 3.2 oz)      Body mass index is 26.26 kg/m².    Blood pressure readings recorded on patient flowsheet:  No flowsheet data found.     MEDICATIONS     Current Outpatient Medications on File Prior to Visit   Medication Sig Dispense Refill   • acetaminophen (TYLENOL) 500 MG tablet Take 1,000 mg by mouth At Night As Needed.     • ASPIRIN 81 PO Take 81 mg by mouth Daily.     • carvedilol (COREG) 6.25 MG tablet TAKE ONE TABLET BY MOUTH TWICE A DAY WITH MEALS 180 tablet 3   • cetirizine (zyrTEC) 10 MG tablet Take 10 mg by mouth Daily.     • coenzyme Q10 100 MG capsule Take 100 mg by mouth Daily.     • diphenhydrAMINE (BENADRYL) 25 MG tablet Take 25 mg by mouth At Night As Needed for Allergies.     • fluticasone (FLONASE) 50 MCG/ACT nasal spray 2 sprays into the nostril(s) as directed by provider Daily.     • ibuprofen (ADVIL,MOTRIN) 400 MG tablet Take 400 mg by mouth Every Night.     • lisinopril-hydrochlorothiazide (PRINZIDE,ZESTORETIC) 20-12.5 MG per tablet Take 1 tablet by mouth.     • Multiple Vitamins-Minerals (MULTIVITAMIN ADULT PO) Take  by mouth Daily.     • vitamin C (ASCORBIC ACID) 500 MG tablet Take 500 mg by mouth Daily.     • [DISCONTINUED] atorvastatin (LIPITOR) 20 MG tablet Take 1 tablet by mouth Daily. 30 tablet 0   • [DISCONTINUED] furosemide (LASIX) 20 MG tablet TAKE ONE TABLET BY " MOUTH DAILY 30 tablet 0   • [DISCONTINUED] hydrALAZINE (APRESOLINE) 10 MG tablet Take 10 mg by mouth 2 (two) times a day.     • [DISCONTINUED] lisinopril (PRINIVIL,ZESTRIL) 20 MG tablet TAKE ONE TABLET BY MOUTH DAILY 90 tablet 4   • [DISCONTINUED] Omega-3 Fatty Acids (fish oil) 1200 MG capsule capsule Take 1,200 mg by mouth Daily.       No current facility-administered medications on file prior to visit.         HISTORY OF PRESENT ILLNESS      Benja is a 81 year old male whom presents for annual health maintenance visit and follow up chronic conditions. He is accompanied by his daughter today.      Hyperlipidemia: Continue Atorvastatin 20 mg well-controlled cholesterol but elevation in triglycerides in the 200s previous labs  No myalgias with medication.     Diabetes mellitus type II: Previously on Metformin but last hemoglobin A1c 6.3.  Holding on Metformin.  No polyuria, polydipsia or peripheral neuropathy.     Hypertension: Followed by nephrology. Continue Carvedilol 6.25mg daily. Blood pressure stable today off of lisinopril and HCTZ.  Denies any chest pain, heart palpitations or shortness of air.  He does have 1+ bilateral lower extremity edema which is impairing his range of motion.  Family member accompanying patient states that nephrology did consider low-dose Lasix at last visit.  He is continuing on his hydralazine 10 mg twice daily for now.  Kidney function is slowly improving with ibuprofen discontinuing.  However, he is still stage III chronic kidney disease.     Patient declines Cologuard or colonoscopy. Has lost two front teeth- does have an appt with Dentist.     · General health: good  · Lifestyle:  · Attempting to lose weight?: No   · Diet: eats a well balanced, healthy diet  · Exercise: does not exercise  · Tobacco: Never used   · Alcohol: occasional/infrequent  · Work: Retired  · Reproductive health:  · Sexually active?: No   · Concern for STD?: No   · Sexual problems?: No problems   · Sees  Urologist?: Yes   · Depression Screening:      PHQ-2/PHQ-9 Depression Screening 2022   Retired Total Score -   Little Interest or Pleasure in Doing Things 0-->not at all   Feeling Down, Depressed or Hopeless 0-->not at all   PHQ-9: Brief Depression Severity Measure Score 0         PHQ-2: 0 (Not depressed)     PHQ-9: 0 (Negative screening for depression)    Patient Care Team:  Yo Mckeon APRN as PCP - General (Internal Medicine)  Luz Marina Solano MD as Consulting Physician (Nephrology)  ______________________________________________________________________    ALLERGIES  No Known Allergies     PFSH:     The following portions of the patient's history were reviewed and updated as appropriate: Allergies / Current Medications / Past Medical History / Surgical History / Social History / Family History    PROBLEM LIST   Patient Active Problem List   Diagnosis   • Benign essential hypertension   • Gastroesophageal reflux disease   • Hyperglycemia   • Hyperlipidemia   • Nocturia   • Renal insufficiency   • Cerebrovascular accident (HCC)   • Type 2 diabetes mellitus (HCC)   • Benign hypertension with chronic kidney disease   • Stage 3b chronic kidney disease (HCC)   • Edema of lower extremity       PAST MEDICAL HISTORY  Past Medical History:   Diagnosis Date   • Diabetes mellitus (HCC)    • Hypertension    • Renal insufficiency        SURGICAL HISTORY  History reviewed. No pertinent surgical history.    SOCIAL HISTORY  Social History     Socioeconomic History   • Marital status: Single   Tobacco Use   • Smoking status: Former Smoker     Packs/day: 1.00     Years: 23.00     Pack years: 23.00     Types: Cigarettes     Start date:      Quit date:      Years since quittin.3   • Smokeless tobacco: Never Used   Vaping Use   • Vaping Use: Never used   Substance and Sexual Activity   • Alcohol use: Yes     Comment: occassional    • Drug use: Never   • Sexual activity: Defer       FAMILY HISTORY  Family History    Problem Relation Age of Onset   • Stroke Mother    • Heart attack Father    • Stroke Brother    • Diverticulitis Brother    • Hypertension Brother        IMMUNIZATION HISTORY  Immunization History   Administered Date(s) Administered   • COVID-19 (PFIZER) PURPLE CAP 03/21/2021, 04/11/2021, 11/23/2021   • Fluad Quad 65+ 03/02/2021   • Tdap 07/26/2019         REVIEW OF SYSTEMS     Review of Systems   Constitutional: Negative.    HENT: Positive for hearing loss.    Eyes: Negative.    Respiratory: Negative.    Cardiovascular: Positive for leg swelling.   Gastrointestinal: Negative.    Endocrine: Negative.    Genitourinary: Negative.    Musculoskeletal: Negative.    Skin: Negative.    Allergic/Immunologic: Positive for environmental allergies.   Neurological: Negative.    Hematological: Negative.    Psychiatric/Behavioral: Negative.        PHYSICAL EXAMINATION     Physical Exam  Constitutional:       Appearance: Normal appearance.   HENT:      Head: Normocephalic.      Right Ear: Tympanic membrane normal.      Left Ear: Tympanic membrane normal.      Nose: Nose normal.      Mouth/Throat:      Mouth: Mucous membranes are moist.   Eyes:      Extraocular Movements: Extraocular movements intact.      Pupils: Pupils are equal, round, and reactive to light.   Cardiovascular:      Rate and Rhythm: Normal rate and regular rhythm.      Pulses: Normal pulses.      Heart sounds: Normal heart sounds.   Pulmonary:      Effort: Pulmonary effort is normal.      Breath sounds: Normal breath sounds.   Abdominal:      General: Bowel sounds are normal.      Palpations: Abdomen is soft.   Musculoskeletal:         General: Normal range of motion.      Cervical back: Normal range of motion and neck supple.      Right lower leg: Edema present.      Left lower leg: Edema present.   Skin:     General: Skin is warm and dry.      Capillary Refill: Capillary refill takes less than 2 seconds.   Neurological:      Mental Status: He is alert. Mental  status is at baseline.      Gait: Gait abnormal.   Psychiatric:         Mood and Affect: Mood normal.         REVIEWED DATA      Labs:    Lab Results   Component Value Date     08/31/2021    K 4.8 08/31/2021    CALCIUM 9.9 08/31/2021    AST 18 08/31/2021    ALT 32 08/31/2021    BUN 27 (H) 08/31/2021    CREATININE 1.67 (H) 08/31/2021    CREATININE 2.24 (H) 04/13/2021    CREATININE 1.90 (H) 03/18/2021    EGFRIFNONA 40 (L) 08/31/2021    EGFRIFAFRI 48 (L) 08/31/2021       Lab Results   Component Value Date    GLUCOSE 107 (H) 08/31/2021    HGBA1C 6.30 (H) 08/31/2021    HGBA1C 6.00 (H) 04/13/2021    HGBA1C 6.1 (H) 03/02/2021    TSH 1.200 08/31/2021    FREET4 1.34 08/31/2021       Lab Results   Component Value Date    PSA 0.644 07/26/2019    PSA 0.611 09/21/2017    PSA 0.5 03/25/2016       No results found for: TESTOSTERONE, TESTOSTEROTT, TESTFRE    Lab Results   Component Value Date    LDL 87 08/31/2021    HDL 39 (L) 08/31/2021    TRIG 136 08/31/2021    CHOLHDLRATIO 3.85 08/31/2021       No components found for: [id number]    Lab Results   Component Value Date    WBC 6.23 08/31/2021    HGB 14.6 08/31/2021    MCV 99.6 (H) 08/31/2021     08/31/2021       Lab Results   Component Value Date    GLUCOSEU Negative 02/07/2014    BLOODU 3+ (A) 02/07/2014    NITRITEU Negative 02/07/2014    LEUKOCYTESUR 1+ (A) 02/07/2014          Lab Results   Component Value Date    HEPCVIRUSABY 0.2 08/07/2020       Imaging:           Medical Tests:           ASSESSMENT & PLAN     ANNUAL WELLNESS EXAM / PHYSICAL     Other medical problems addressed today:  Problem List Items Addressed This Visit        Cardiac and Vasculature    Benign essential hypertension    Relevant Medications    carvedilol (COREG) 6.25 MG tablet    lisinopril-hydrochlorothiazide (PRINZIDE,ZESTORETIC) 20-12.5 MG per tablet    Other Relevant Orders    CBC & Differential    Comprehensive Metabolic Panel    T4, Free    TSH    Urinalysis With Culture If Indicated -     Hyperlipidemia    Relevant Medications    atorvastatin (LIPITOR) 20 MG tablet    Other Relevant Orders    CBC & Differential    Comprehensive Metabolic Panel    Lipid Panel With / Chol / HDL Ratio    T4, Free    TSH    Urinalysis With Culture If Indicated -       Endocrine and Metabolic    Type 2 diabetes mellitus (HCC) - Primary    Relevant Orders    CBC & Differential    Comprehensive Metabolic Panel    Hemoglobin A1c    Microalbumin / Creatinine Urine Ratio - Urine, Clean Catch       Gastrointestinal Abdominal     Gastroesophageal reflux disease       Genitourinary and Reproductive     Stage 3b chronic kidney disease (HCC)       Symptoms and Signs    Edema of lower extremity      Other Visit Diagnoses     Prostate cancer screening        Relevant Orders    PSA Screen    Malignant neoplasm of skin of upper extremity, unspecified laterality        Relevant Orders    Ambulatory Referral to Dermatology          Summary/Discussion:     · Follow up Dr Solano, [facility]  · Referral to Dermatology for benign skin cancers bilateral arms  · I spent 40 min in direct care of this patient on this date of service. This time includes times spent by me in the following activities: Preparing for the visit, obtaining and/or reviewing a separately obtained history, performing a medically appropriate examination and/or evaluation, reviewing medical records, reviewing tests, ordering medications, tests, or procedures, counseling and educating the patient, documenting information in the medical record and reviewing office note/correspondence from other providers.     Return in about 6 months (around 11/17/2022) for Next scheduled follow up.      HEALTHCARE MAINTENANCE ISSUES       Cancer Screening:  · Colon: Initial/Next screening at age: PATIENT DEFERS COLONOSCOPY OR COLOGUARD AT THIS TIME  · Repeat colon cancer screening: every 10 years  · Prostate: PSA checked annually but no LOUIE needed  currently  · Testicular: Recommended monthly self exam  · Skin: Monthly self skin examination, annual exam by health professional  · Lung: Does not meet criteria for lung cancer screening.   · Other:    Screening Labs & Tests:  · Lab results reviewed & discussed with with patient or orders placed today.  · EKG:  · CV Screening: Lipid panel  · DEXA (75+ or risk factors):   · HEP C (If born [id number] or risk factors): Negative screen  · Other:     Immunization/Vaccinations (to be given today unless deferred by patient)  · Influenza: Patient had the flu shot this season  · Hepatitis A: Verify immunization records  · Hepatitis B: Verify immunization records  · Tetanus/Pertussis: Up to date  · Pneumovax/PCV: Administer outside pharmacy  · Shingles: Administer outside pharmacy  · COVID:  COVID: Completed primary vaccine series and booster  Lifestyle Counseling:  · Lifestyle Modifications: Continue good lifestyle choices/modifications  · Safety Issues: Always wear seatbelt, Avoid texting while driving   · Use sunscreen, regular skin examination  · Recommended annual dental/vision examination.  · Emotional/Stress/Sleep: Reviewed and  given when appropriate      Health Maintenance   Topic Date Due   • Pneumococcal Vaccine 65+ (1 - PCV) Never done   • ZOSTER VACCINE (1 of 2) Never done   • URINE MICROALBUMIN  07/26/2020   • ANNUAL WELLNESS VISIT  02/06/2021   • DIABETIC EYE EXAM  02/06/2021   • HEMOGLOBIN A1C  02/28/2022   • INFLUENZA VACCINE  08/01/2022   • LIPID PANEL  09/17/2022   • TDAP/TD VACCINES (2 - Td or Tdap) 07/26/2029   • COLORECTAL CANCER SCREENING  07/26/2029   • HEPATITIS C SCREENING  Completed   • COVID-19 Vaccine  Completed           *Examiner was wearing  mask protection during the entire duration of the visit. Patient was masked the entire time. Minimum social distance of 6 ft maintained entire visit except if physical contact was necessary as documented.       Template created by EVELIA Pino

## 2022-05-17 NOTE — PATIENT INSTRUCTIONS

## 2022-05-18 LAB
ALBUMIN SERPL-MCNC: 4.9 G/DL (ref 3.7–4.7)
ALBUMIN/GLOB SERPL: 2 {RATIO} (ref 1.2–2.2)
ALP SERPL-CCNC: 85 IU/L (ref 44–121)
ALT SERPL-CCNC: 23 IU/L (ref 0–44)
AST SERPL-CCNC: 14 IU/L (ref 0–40)
BASOPHILS # BLD AUTO: 0.1 X10E3/UL (ref 0–0.2)
BASOPHILS NFR BLD AUTO: 1 %
BILIRUB SERPL-MCNC: 0.6 MG/DL (ref 0–1.2)
BUN SERPL-MCNC: 38 MG/DL (ref 8–27)
BUN/CREAT SERPL: 20 (ref 10–24)
CALCIUM SERPL-MCNC: 10.1 MG/DL (ref 8.6–10.2)
CHLORIDE SERPL-SCNC: 103 MMOL/L (ref 96–106)
CHOLEST SERPL-MCNC: 137 MG/DL (ref 100–199)
CHOLEST/HDLC SERPL: 4.3 RATIO (ref 0–5)
CO2 SERPL-SCNC: 20 MMOL/L (ref 20–29)
CREAT SERPL-MCNC: 1.91 MG/DL (ref 0.76–1.27)
CREAT UR-MCNC: NORMAL MG/DL
EGFRCR SERPLBLD CKD-EPI 2021: 35 ML/MIN/1.73
EOSINOPHIL # BLD AUTO: 0.4 X10E3/UL (ref 0–0.4)
EOSINOPHIL NFR BLD AUTO: 4 %
ERYTHROCYTE [DISTWIDTH] IN BLOOD BY AUTOMATED COUNT: 11.8 % (ref 11.6–15.4)
GLOBULIN SER CALC-MCNC: 2.4 G/DL (ref 1.5–4.5)
GLUCOSE SERPL-MCNC: 99 MG/DL (ref 65–99)
GLUCOSE UR QL STRIP: NORMAL
HBA1C MFR BLD: 6.3 % (ref 4.8–5.6)
HCT VFR BLD AUTO: 41.5 % (ref 37.5–51)
HDLC SERPL-MCNC: 32 MG/DL
HGB BLD-MCNC: 13.6 G/DL (ref 13–17.7)
IMM GRANULOCYTES # BLD AUTO: 0 X10E3/UL (ref 0–0.1)
IMM GRANULOCYTES NFR BLD AUTO: 0 %
KETONES UR QL STRIP: NORMAL
LDLC SERPL CALC-MCNC: 79 MG/DL (ref 0–99)
LYMPHOCYTES # BLD AUTO: 1.6 X10E3/UL (ref 0.7–3.1)
LYMPHOCYTES NFR BLD AUTO: 16 %
MCH RBC QN AUTO: 32.2 PG (ref 26.6–33)
MCHC RBC AUTO-ENTMCNC: 32.8 G/DL (ref 31.5–35.7)
MCV RBC AUTO: 98 FL (ref 79–97)
MICROALBUMIN UR-MCNC: NORMAL
MONOCYTES # BLD AUTO: 0.7 X10E3/UL (ref 0.1–0.9)
MONOCYTES NFR BLD AUTO: 7 %
NEUTROPHILS # BLD AUTO: 7.5 X10E3/UL (ref 1.4–7)
NEUTROPHILS NFR BLD AUTO: 72 %
PH UR STRIP: NORMAL [PH]
PLATELET # BLD AUTO: 236 X10E3/UL (ref 150–450)
POTASSIUM SERPL-SCNC: 4.8 MMOL/L (ref 3.5–5.2)
PROT SERPL-MCNC: 7.3 G/DL (ref 6–8.5)
PROT UR QL STRIP: NORMAL
PSA SERPL-MCNC: 0.8 NG/ML (ref 0–4)
RBC # BLD AUTO: 4.23 X10E6/UL (ref 4.14–5.8)
SODIUM SERPL-SCNC: 141 MMOL/L (ref 134–144)
SP GR UR STRIP: NORMAL
SPECIMEN STATUS: NORMAL
T4 FREE SERPL-MCNC: 1.57 NG/DL (ref 0.82–1.77)
TRIGL SERPL-MCNC: 150 MG/DL (ref 0–149)
TSH SERPL DL<=0.005 MIU/L-ACNC: 1.02 UIU/ML (ref 0.45–4.5)
UNABLE TO VOID: NORMAL
VLDLC SERPL CALC-MCNC: 26 MG/DL (ref 5–40)
WBC # BLD AUTO: 10.2 X10E3/UL (ref 3.4–10.8)

## 2022-05-20 ENCOUNTER — TELEPHONE (OUTPATIENT)
Dept: INTERNAL MEDICINE | Age: 82
End: 2022-05-20

## 2022-05-20 NOTE — TELEPHONE ENCOUNTER
[facility] INSTRUCTING PT TO RETURN CALL TO BE READ LAB RESULTS. LETTER SENT VIA [employer]

## 2022-05-20 NOTE — TELEPHONE ENCOUNTER
----- Message from EVELIA Reyes sent at 5/19/2022  9:47 AM EDT -----  Mr. Macedo,    I have reviewed your recent labs.     Your CBC Thyroid levels and PSA look good.     CMP demonstrates elevated Kidney function levels. Continue to limit your fluids to no more than 1200cc daily and follow up with  your Nephrologist. Your A1C was good at 6.3.      Cholesterol levels are better than previous check. Triglycerides are elevated, which can be related to elevated blood sugar or fat intake. Continue lifestyle modifications for high cholesterol. Decrease/eliminate soda, caffeine, alcohol and overall caloric intake. Reduce carbohydrates and sweets in diet.  Continue to improve dietary habits with lean proteins, fresh vegetables, fruits, and nuts. Improve aerobic exercise: walking/biking/swimming daily as tolerated, recommend 30 minutes/day at least.    Sincerely,  Bianca ALTAMIRANO

## 2022-05-29 DIAGNOSIS — E78.2 MIXED HYPERLIPIDEMIA: ICD-10-CM

## 2022-05-31 RX ORDER — ATORVASTATIN CALCIUM 20 MG/1
TABLET, FILM COATED ORAL
Qty: 30 TABLET | Refills: 11 | Status: SHIPPED | OUTPATIENT
Start: 2022-05-31

## 2022-09-06 ENCOUNTER — HOSPITAL ENCOUNTER (EMERGENCY)
Facility: HOSPITAL | Age: 82
Discharge: HOME | End: 2022-09-06
Attending: EMERGENCY MEDICINE | Admitting: EMERGENCY MEDICINE

## 2022-09-06 ENCOUNTER — APPOINTMENT (OUTPATIENT)
Dept: CT IMAGING | Facility: HOSPITAL | Age: 82
End: 2022-09-06

## 2022-09-06 VITALS
HEART RATE: 90 BPM | SYSTOLIC BLOOD PRESSURE: 170 MMHG | TEMPERATURE: 97.8 F | DIASTOLIC BLOOD PRESSURE: 100 MMHG | OXYGEN SATURATION: 100 % | RESPIRATION RATE: 16 BRPM
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
## 2022-09-06 DIAGNOSIS — R10.9 RIGHT FLANK PAIN: ICD-10-CM

## 2022-09-06 DIAGNOSIS — S22.31XA CLOSED FRACTURE OF ONE RIB OF RIGHT SIDE, INITIAL ENCOUNTER: ICD-10-CM

## 2022-09-06 LAB
ALBUMIN SERPL-MCNC: 3.9 G/DL (ref 3.5–5.2)
ALBUMIN/GLOB SERPL: 1.3 G/DL
ALP SERPL-CCNC: 94 U/L (ref 39–117)
ALT SERPL W P-5'-P-CCNC: 17 U/L (ref 1–41)
ANION GAP SERPL CALCULATED.3IONS-SCNC: 12 MMOL/L (ref 5–15)
AST SERPL-CCNC: 13 U/L (ref 1–40)
BASOPHILS # BLD AUTO: 0.04 10*3/MM3 (ref 0–0.2)
BASOPHILS NFR BLD AUTO: 0.5 % (ref 0–1.5)
BILIRUB SERPL-MCNC: 0.5 MG/DL (ref 0–1.2)
BILIRUB UR QL STRIP: NEGATIVE
BUN SERPL-MCNC: 28 MG/DL (ref 8–23)
BUN/CREAT SERPL: 15.6 (ref 7–25)
CALCIUM SPEC-SCNC: 9.5 MG/DL (ref 8.6–10.5)
CHLORIDE SERPL-SCNC: 104 MMOL/L (ref 98–107)
CLARITY UR: CLEAR
CO2 SERPL-SCNC: 22 MMOL/L (ref 22–29)
COLOR UR: YELLOW
CREAT SERPL-MCNC: 1.8 MG/DL (ref 0.76–1.27)
DEPRECATED RDW RBC AUTO: 39.9 FL (ref 37–54)
EGFRCR SERPLBLD CKD-EPI 2021: 37.8 ML/MIN/1.73
EOSINOPHIL # BLD AUTO: 0.19 10*3/MM3 (ref 0–0.4)
EOSINOPHIL NFR BLD AUTO: 2.3 % (ref 0.3–6.2)
ERYTHROCYTE [DISTWIDTH] IN BLOOD BY AUTOMATED COUNT: 11.5 % (ref 12.3–15.4)
GLOBULIN UR ELPH-MCNC: 3.1 GM/DL
GLUCOSE SERPL-MCNC: 134 MG/DL (ref 65–99)
GLUCOSE UR STRIP-MCNC: NEGATIVE MG/DL
HCT VFR BLD AUTO: 41.8 % (ref 37.5–51)
HGB BLD-MCNC: 14.3 G/DL (ref 13–17.7)
HGB UR QL STRIP.AUTO: NEGATIVE
HOLD SPECIMEN: NORMAL
IMM GRANULOCYTES # BLD AUTO: 0.02 10*3/MM3 (ref 0–0.05)
IMM GRANULOCYTES NFR BLD AUTO: 0.2 % (ref 0–0.5)
KETONES UR QL STRIP: NEGATIVE
LEUKOCYTE ESTERASE UR QL STRIP.AUTO: NEGATIVE
LIPASE SERPL-CCNC: 17 U/L (ref 13–60)
LYMPHOCYTES # BLD AUTO: 1.43 10*3/MM3 (ref 0.7–3.1)
LYMPHOCYTES NFR BLD AUTO: 17.4 % (ref 19.6–45.3)
MCH RBC QN AUTO: 32.2 PG (ref 26.6–33)
MCHC RBC AUTO-ENTMCNC: 34.2 G/DL (ref 31.5–35.7)
MCV RBC AUTO: 94.1 FL (ref 79–97)
MONOCYTES # BLD AUTO: 0.55 10*3/MM3 (ref 0.1–0.9)
MONOCYTES NFR BLD AUTO: 6.7 % (ref 5–12)
NEUTROPHILS NFR BLD AUTO: 6 10*3/MM3 (ref 1.7–7)
NEUTROPHILS NFR BLD AUTO: 72.9 % (ref 42.7–76)
NITRITE UR QL STRIP: NEGATIVE
NRBC BLD AUTO-RTO: 0 /100 WBC (ref 0–0.2)
PH UR STRIP.AUTO: 6.5 [PH] (ref 5–8)
PLATELET # BLD AUTO: 246 10*3/MM3 (ref 140–450)
PMV BLD AUTO: 10.2 FL (ref 6–12)
POTASSIUM SERPL-SCNC: 4.2 MMOL/L (ref 3.5–5.2)
PROT SERPL-MCNC: 7 G/DL (ref 6–8.5)
PROT UR QL STRIP: ABNORMAL
RBC # BLD AUTO: 4.44 10*6/MM3 (ref 4.14–5.8)
SODIUM SERPL-SCNC: 138 MMOL/L (ref 136–145)
SP GR UR STRIP: 1.02 (ref 1–1.03)
UROBILINOGEN UR QL STRIP: ABNORMAL
WBC NRBC COR # BLD: 8.23 10*3/MM3 (ref 3.4–10.8)
WHOLE BLOOD HOLD COAG: NORMAL
WHOLE BLOOD HOLD SPECIMEN: NORMAL

## 2022-09-06 PROCEDURE — 80053 COMPREHEN METABOLIC PANEL: CPT | Performed by: NURSE PRACTITIONER

## 2022-09-06 PROCEDURE — 83690 ASSAY OF LIPASE: CPT | Performed by: NURSE PRACTITIONER

## 2022-09-06 PROCEDURE — 74176 CT ABD & PELVIS W/O CONTRAST: CPT

## 2022-09-06 PROCEDURE — 36415 COLL VENOUS BLD VENIPUNCTURE: CPT

## 2022-09-06 PROCEDURE — 85025 COMPLETE CBC W/AUTO DIFF WBC: CPT | Performed by: NURSE PRACTITIONER

## 2022-09-06 PROCEDURE — 81003 URINALYSIS AUTO W/O SCOPE: CPT | Performed by: NURSE PRACTITIONER

## 2022-09-06 PROCEDURE — 99283 EMERGENCY DEPT VISIT LOW MDM: CPT

## 2022-09-06 RX ORDER — LIDOCAINE 50 MG/G
1 PATCH TOPICAL EVERY 24 HOURS
Qty: 10 PATCH | Refills: 0 | Status: SHIPPED | OUTPATIENT
Start: 2022-09-06

## 2022-09-06 NOTE — DISCHARGE INSTRUCTIONS
Use incentive spirometer as directed  Home to rest  Wear lidoderm patches as directed  Cough and deep breathe regularly to expand the lungs as discussed   Drink plenty of fluids  Return if worse or new concerns   Follow up with your doctor   Continue care with your primary care physician and have your blood pressure regularly checked and managed. Normal blood pressure is 120/80.

## 2022-09-06 NOTE — ED TRIAGE NOTES
Pt is weak at baseline and fell yest and has right flank pain and bruising to right flank.  He sneezed  last night and his pain increased in his flank and it has not subsided    Patient was placed in face mask during first look triage.  Patient was wearing a face mask throughout encounter.  I wore personal protective equipment throughout the encounter.  Hand hygiene was performed before and after patient encounter.

## 2022-09-06 NOTE — ED PROVIDER NOTES
Pt presents to the ED c/o  right flank pain after fall 2 days ago, has some bruising and pain in his, right flank pain over the lower ribs.  Denies any difficulty breathing.  Worse with movement.  No abdominal pain, nausea, vomiting, no hematuria or bloody stools.     On exam,   General: No acute distress, nontoxic  HEENT: Mucous membranes moist, atraumatic, EOMI  Neck: Full ROM  Pulm: Symmetric chest rise, nonlabored, lungs CTAB  Cardiovascular: Regular rate and rhythm, intact distal pulses  GI: Soft, nontender, nondistended, no rebound, no guarding, bowel sounds present  MSK: Full ROM, no deformity, right CVA tenderness without ecchymosis over the posterior aspect of the lower ribs on the right flank without crepitus  Skin: Warm, dry  Neuro: Awake, alert, oriented x 4, GCS 15, moving all extremities, no focal deficits  Psych: Calm, cooperative      N95, protective eye goggles, and gloves used during this encounter. Patient in surgical mask.      Plan:   ED Course as of 09/06/22 1317   Tue Sep 06, 2022   1224 WBC: 8.23 [JS]   1224 Hemoglobin: 14.3 [JS]      ED Course User Index  [JS] Sherlyn Bynum, EVELIA     Isolated right 11th rib fracture, no other evidence of any acute intra-abdominal issues.  Safe for discharge with supportive care, PCP follow-up, ED return for worsening symptoms as needed.     Attestation:  The JUNIOR and I have discussed this patient's history, physical exam, and treatment plan.  I have reviewed the documentation and personally had a face to face interaction with the patient. I affirm the documentation and agree with the treatment and plan.  The attached note describes my personal findings.          Rico Keating MD  09/06/22 2869

## 2022-09-06 NOTE — ED PROVIDER NOTES
EMERGENCY DEPARTMENT ENCOUNTER    Room Number:  B01/01  Date of encounter:  9/6/2022  PCP: Yo Mckeon APRN  Historian: patient   Full history not obtainable due to: none     HPI:  Chief Complaint: Right flank pain, fall     Context: Benja Macedo is a 81 y.o. male who presents to the ED c/o right flank pain onset 2 days ago after a fall. He states he has limited use of his right hand and his walker slipped and he was unable to stabilize himself, causing him to fall. He denies any head injury or LOC. Complains now of constant pain in the right flank. It is worse with moving. Radiates to right ribs. Noted to be severe at times.     Takes baby aspirin daily.      PAST MEDICAL HISTORY    Active Ambulatory Problems     Diagnosis Date Noted   • Benign essential hypertension 03/20/2016   • Gastroesophageal reflux disease 03/20/2016   • Hyperglycemia 03/20/2016   • Hyperlipidemia 03/20/2016   • Nocturia 03/20/2016   • Renal insufficiency 03/20/2016   • Cerebrovascular accident (HCC) 03/20/2016   • Type 2 diabetes mellitus (HCC) 03/02/2021   • Benign hypertension with chronic kidney disease 11/23/2021   • Stage 3b chronic kidney disease (HCC) 11/23/2021   • Edema of lower extremity 12/05/2021     Resolved Ambulatory Problems     Diagnosis Date Noted   • Urinary tract infection 03/20/2016   • Type 2 diabetes mellitus with kidney complication, without long-term current use of insulin (HCC) 03/30/2016     Past Medical History:   Diagnosis Date   • Diabetes mellitus (HCC)    • Hypertension          PAST SURGICAL HISTORY  No past surgical history on file.      FAMILY HISTORY  Family History   Problem Relation Age of Onset   • Stroke Mother    • Heart attack Father    • Stroke Brother    • Diverticulitis Brother    • Hypertension Brother          SOCIAL HISTORY  Social History     Socioeconomic History   • Marital status: Single   Tobacco Use   • Smoking status: Former Smoker     Packs/day: 1.00     Years: 23.00     Pack  years: 23.00     Types: Cigarettes     Start date:      Quit date:      Years since quittin.7   • Smokeless tobacco: Never Used   Vaping Use   • Vaping Use: Never used   Substance and Sexual Activity   • Alcohol use: Yes     Comment: occassional    • Drug use: Never   • Sexual activity: Defer         ALLERGIES  Patient has no known allergies.        REVIEW OF SYSTEMS  Review of Systems   All systems reviewed and marked as negative except as listed in HPI       PHYSICAL EXAM    I have reviewed the triage vital signs and nursing notes.    ED Triage Vitals   Temp Heart Rate Resp BP SpO2   22 1142 22 1141 22 1141 22 1141 22 1141   97.8 °F (36.6 °C) 90 16 170/100 100 %      Temp src Heart Rate Source Patient Position BP Location FiO2 (%)   22 1142 22 1141 -- -- --   Tympanic Monitor          GENERAL: alert well developed, well nourished in no distress  HENT: NCAT, neck supple, trachea midline  EYES: no scleral icterus, PERRL, normal conjunctivae  CV: regular rhythm, regular rate, no murmur  RESPIRATORY: unlabored effort, CTAB  ABDOMEN: soft, nontender, nondistended, bowel sounds present  MUSCULOSKELETAL: no gross deformity. Yellowed bruising to the right flank. Tenderness in this region. No focal tenderness of the vertebral spine. No tenderness of the right ribs.  NEURO: alert,  sensory and motor function of extremities grossly intact, speech clear, mental status normal/baseline  SKIN: warm, dry, no rash  PSYCH:  Appropriate mood and affect    Vital signs and nursing notes reviewed.          LAB RESULTS  Recent Results (from the past 24 hour(s))   Comprehensive Metabolic Panel    Collection Time: 22 12:07 PM    Specimen: Blood   Result Value Ref Range    Glucose 134 (H) 65 - 99 mg/dL    BUN 28 (H) 8 - 23 mg/dL    Creatinine 1.80 (H) 0.76 - 1.27 mg/dL    Sodium 138 136 - 145 mmol/L    Potassium 4.2 3.5 - 5.2 mmol/L    Chloride 104 98 - 107 mmol/L    CO2 22.0  22.0 - 29.0 mmol/L    Calcium 9.5 8.6 - 10.5 mg/dL    Total Protein 7.0 6.0 - 8.5 g/dL    Albumin 3.90 3.50 - 5.20 g/dL    ALT (SGPT) 17 1 - 41 U/L    AST (SGOT) 13 1 - 40 U/L    Alkaline Phosphatase 94 39 - 117 U/L    Total Bilirubin 0.5 0.0 - 1.2 mg/dL    Globulin 3.1 gm/dL    A/G Ratio 1.3 g/dL    BUN/Creatinine Ratio 15.6 7.0 - 25.0    Anion Gap 12.0 5.0 - 15.0 mmol/L    eGFR 37.8 (L) >60.0 mL/min/1.73   Lipase    Collection Time: 09/06/22 12:07 PM    Specimen: Blood   Result Value Ref Range    Lipase 17 13 - 60 U/L   CBC Auto Differential    Collection Time: 09/06/22 12:07 PM    Specimen: Blood   Result Value Ref Range    WBC 8.23 3.40 - 10.80 10*3/mm3    RBC 4.44 4.14 - 5.80 10*6/mm3    Hemoglobin 14.3 13.0 - 17.7 g/dL    Hematocrit 41.8 37.5 - 51.0 %    MCV 94.1 79.0 - 97.0 fL    MCH 32.2 26.6 - 33.0 pg    MCHC 34.2 31.5 - 35.7 g/dL    RDW 11.5 (L) 12.3 - 15.4 %    RDW-SD 39.9 37.0 - 54.0 fl    MPV 10.2 6.0 - 12.0 fL    Platelets 246 140 - 450 10*3/mm3    Neutrophil % 72.9 42.7 - 76.0 %    Lymphocyte % 17.4 (L) 19.6 - 45.3 %    Monocyte % 6.7 5.0 - 12.0 %    Eosinophil % 2.3 0.3 - 6.2 %    Basophil % 0.5 0.0 - 1.5 %    Immature Grans % 0.2 0.0 - 0.5 %    Neutrophils, Absolute 6.00 1.70 - 7.00 10*3/mm3    Lymphocytes, Absolute 1.43 0.70 - 3.10 10*3/mm3    Monocytes, Absolute 0.55 0.10 - 0.90 10*3/mm3    Eosinophils, Absolute 0.19 0.00 - 0.40 10*3/mm3    Basophils, Absolute 0.04 0.00 - 0.20 10*3/mm3    Immature Grans, Absolute 0.02 0.00 - 0.05 10*3/mm3    nRBC 0.0 0.0 - 0.2 /100 WBC   Green Top (Gel)    Collection Time: 09/06/22 12:07 PM   Result Value Ref Range    Extra Tube Hold for add-ons.    Lavender Top    Collection Time: 09/06/22 12:07 PM   Result Value Ref Range    Extra Tube hold for add-on    Light Blue Top    Collection Time: 09/06/22 12:07 PM   Result Value Ref Range    Extra Tube Hold for add-ons.    Urinalysis With Microscopic If Indicated (No Culture) - Urine, Clean Catch    Collection Time:  09/06/22 12:52 PM    Specimen: Urine, Clean Catch   Result Value Ref Range    Color, UA Yellow Yellow, Straw    Appearance, UA Clear Clear    pH, UA 6.5 5.0 - 8.0    Specific Gravity, UA 1.019 1.005 - 1.030    Glucose, UA Negative Negative    Ketones, UA Negative Negative    Bilirubin, UA Negative Negative    Blood, UA Negative Negative    Protein, UA Trace (A) Negative    Leuk Esterase, UA Negative Negative    Nitrite, UA Negative Negative    Urobilinogen, UA 0.2 E.U./dL 0.2 - 1.0 E.U./dL       Ordered the above labs and independently reviewed the results.        RADIOLOGY  CT Abdomen Pelvis Without Contrast    Result Date: 9/6/2022  CT ABDOMEN AND PELVIS WITHOUT IV CONTRAST  HISTORY: 81-year-old male with abdominal pain. Right-sided flank pain after a fall.  TECHNIQUE: Radiation dose reduction techniques were utilized, including automated exposure control and exposure modulation based on body size. 3 mm images were obtained through the abdomen and pelvis without the administration of IV contrast. There is no previous CT for comparison.  FINDINGS: There is a hairline nondisplaced fracture at the posterior aspect of the right 11th rib, image 65. Noncontrasted liver, gallbladder, spleen, pancreas, adrenals, and kidneys appear unremarkable. There are no renal or ureteral stones and there is no hydronephrosis bilaterally. No acute bowel abnormality is seen. The appendix appears normal. There is no free fluid or lymphadenopathy. There are moderate abdominal aortic atherosclerotic changes without aneurysmal dilatation.      Hairline nondisplaced fracture at the posterior aspect of the right 11th rib.  Discussed with Dr. Keating.        I ordered the above noted radiological studies. Independently reviewed by me and discussed with radiologist.  See dictation above for official radiology interpretation.      PROCEDURES    Procedures        MEDICATIONS GIVEN IN ER    Medications - No data to display      PROGRESS, DATA  ANALYSIS, CONSULTS, AND MEDICAL DECISION MAKING    All labs have been independently reviewed by me.  All radiology studies have been reviewed by me.   EKG's independently reviewed by me.  Discussion below represents my analysis of pertinent findings related to patient's condition, differential diagnosis, treatment plan and final disposition.    DIFFERENTIAL DIAGNOSIS INCLUDE BUT NOT LIMITED TO: Lumbago, muscle spasm, vertebral fracture, spinal stenosis, lumbar radiculopathy, cauda equina syndrome, DDD, AAA, retroperitoneal hematoma, SBO, diverticulitis, UTI      ED Course as of 09/06/22 1615   Tue Sep 06, 2022   1224 WBC: 8.23 [JS]   1224 Hemoglobin: 14.3 [JS]      ED Course User Index  [JS] Sherlyn Bynum APRN     MDM: Patient presents to ED status post fall several days prior.  He sustained a single rib fracture on the right side, which corresponds to his pain on exam and bruising.  We discussed close follow-up with his PCP.  Will recommend incentive spirometry and Lidoderm patches for pain control.  Discussed return precautions.  He is understanding the plan.        AS OF 16:15 EDT VITALS:        BP - 170/100  HR - 90  TEMP - 97.8 °F (36.6 °C) (Tympanic)  O2 SATS - 100%         Medication List      New Prescriptions    lidocaine 5 %  Commonly known as: Lidoderm  Place 1 patch on the skin as directed by provider Daily. Remove & Discard patch within 12 hours or as directed by MD           Where to Get Your Medications      [facility] [address] [facility] [address] [facility]  [facility] & (ONESIMO  - [phone] [facility] [phone]   [address] [place] [address], [place] [id number]    Phone: [phone]   · lidocaine 5 %           DIAGNOSIS  Final diagnoses:   Fall, initial encounter   Right flank pain   Closed fracture of one rib of right side, initial encounter         DISPOSITION  Discharge     Pt masked in first look. I wore appropriate PPE throughout my encounters  with the pt. I performed hand hygiene on entry into the pt room and upon exit.     Dictated utilizing Dragon dictation:  Much of this encounter note is an electronic transcription/translation of spoken language to printed text.      Sherlyn Bynum, APRN  09/06/22 2146

## 2022-09-07 ENCOUNTER — PATIENT OUTREACH (OUTPATIENT)
Dept: CASE MANAGEMENT | Facility: OTHER | Age: 82
End: 2022-09-07

## 2022-09-07 NOTE — OUTREACH NOTE
AMBULATORY CASE MANAGEMENT NOTE    Name and Relationship of Patient/Support Person: Reinaldo Macedo - Emergency Contact    Adult Patient Profile  Questions/Answers    Flowsheet Row Most Recent Value   Symptoms/Conditions Managed at Home musculoskeletal   Barriers to Managing Health other (see comments)  [Lidocaine reportedly not helping with pain. ]   Musculoskeletal Symptoms/Conditions fracture   Musculoskeletal Management Strategies medication therapy, other (see comments)  [ACM recommended a follow up with Mike ALTAMIRANO for further eval and treatment]   Musculoskeletal Self-Management Outcome 3 (uncertain)   Musculoskeletal Comment Sister-in-law plans to observe patient for a few more weeks before scheduling a follow up. Alternate pain medication requested.       Patient Outreach    Introduced self, explained ACM RN role and provided contact information. Spoke with patient's brother and sister-in-law. Patient still in pain with Lidocaine patches. They are falling off in bed as well. Message to PCP with request for alternate pain management therapy. Sister-in-law plans to schedule a follow up with PCP, but not for a few weeks. Follow up scheduled tomorrow to review pain management request.     Education Documentation  No documentation found.        JEFF LOCO  Ambulatory Case Management    9/7/2022, 17:05 EDT

## 2022-09-08 ENCOUNTER — OFFICE VISIT (OUTPATIENT)
Dept: INTERNAL MEDICINE | Age: 82
End: 2022-09-08

## 2022-09-08 VITALS
TEMPERATURE: 96.9 F | DIASTOLIC BLOOD PRESSURE: 62 MMHG | BODY MASS INDEX: 26.92 KG/M2 | OXYGEN SATURATION: 98 % | WEIGHT: 188 LBS | HEART RATE: 91 BPM | SYSTOLIC BLOOD PRESSURE: 120 MMHG | HEIGHT: 70 IN

## 2022-09-08 DIAGNOSIS — R07.81 RIB PAIN ON RIGHT SIDE: ICD-10-CM

## 2022-09-08 DIAGNOSIS — L98.9 SKIN LESION: ICD-10-CM

## 2022-09-08 DIAGNOSIS — S22.31XD CLOSED FRACTURE OF ONE RIB OF RIGHT SIDE WITH ROUTINE HEALING, SUBSEQUENT ENCOUNTER: Primary | ICD-10-CM

## 2022-09-08 DIAGNOSIS — Z51.81 THERAPEUTIC DRUG MONITORING: ICD-10-CM

## 2022-09-08 PROCEDURE — 99213 OFFICE O/P EST LOW 20 MIN: CPT | Performed by: NURSE PRACTITIONER

## 2022-09-08 RX ORDER — LISINOPRIL AND HYDROCHLOROTHIAZIDE 20; 12.5 MG/1; MG/1
1 TABLET ORAL DAILY
COMMUNITY
Start: 2022-06-29 | End: 2023-06-29

## 2022-09-08 RX ORDER — HYDROCODONE BITARTRATE AND ACETAMINOPHEN 10; 325 MG/1; MG/1
1 TABLET ORAL EVERY 6 HOURS PRN
Qty: 12 TABLET | Refills: 0 | Status: SHIPPED | OUTPATIENT
Start: 2022-09-08

## 2022-09-08 NOTE — PROGRESS NOTES
"[facility] [place]  EVELIA Rosario    Benja Macedo / 81 y.o. / male  09/08/2022      CC:   Fall (R side rib fx )      VITALS    Visit Vitals  /62 (Cuff Size: Adult)   Pulse 91   Temp 96.9 °F (36.1 °C) (Temporal)   Ht 177.8 cm (70\")   Wt 85.3 kg (188 lb) Comment: stated, unable to stand   SpO2 98%   BMI 26.98 kg/m²       BP Readings from Last 3 Encounters:   09/08/22 120/62   09/06/22 170/100   05/17/22 118/78     Wt Readings from Last 3 Encounters:   09/08/22 85.3 kg (188 lb)   05/17/22 83 kg (183 lb)   08/31/21 95.1 kg (209 lb 9.6 oz)      Body mass index is 26.98 kg/m².    HPI:     Date of emergency room evaluation: 09/06/2022  Hospital: [facility]  Principle Dx: Right flank pain, fall   Secondary Dx:   History prior to emergency room visit: Fall 2 days prior to emergency room visit on September 4 with subsequent right flank pain.  Fall due to limited use of right hand and walker slipped and was unable to stabilize himself.  No head injury or loss of consciousness.   Evaluation/Treatment: CT of the abdomen and pelvis was ordered with hairline nondisplaced fracture of the posterior aspect of the right 11th rib, only other abnormality was moderate abdominal aortic atherosclerotic changes without aneurysmal dilation.  To note this patient is on statin medication along with aspirin 81 mg daily.   Course: Patient was discharged with lidocaine patches along with an incentive spirometer.  Blood pressure was elevated in the ER but has normalized now at 120/62 today.  Lidocaine patches are not improving his pain, he does have chronic kidney disease with last creatinine of 1.9 so he is very limited on pain medications.  States that his pain can get up to a 10 out of 10 with movement.     Patient Care Team:  Yo Mckeon APRN as PCP - General (Internal Medicine)  Luz Marina Solano MD as Consulting Physician (Nephrology)  Ginger Loja RN as Ambulatory  (Population " Health)  ____________________________________________________________________    ASSESSMENT & PLAN:    1. Closed fracture of one rib of right side with routine healing, subsequent encounter    2. Skin lesion    3. Rib pain on right side    4. Therapeutic drug monitoring      Orders Placed This Encounter   Procedures   • Compliance Drug Analysis, Ur - Urine, Clean Catch   • Ambulatory Referral to Dermatology       Summary/Discussion:  · As we are limited for pain medications we will give 3 days of Norco, you may supplement with Tylenol as long as he does not go over 4 g of acetaminophen in combination with the Norco.  We have given him Norco 10, however I recommend he only take 1 to 2 tablets daily and cut in half.  He is agreeable to this  · Urine drug compliance ordered, Ricardo reviewed, controlled substance contract signed  · Follow-up as scheduled, earlier if pain does not improve referral for dermatology as patient is requesting this with multiple skin lesions  · Recommend continuing incentive spirometer    No follow-ups on file.    ____________________________________________________________________    REVIEW OF SYSTEMS    Review of Systems  Constitutional neg except per HPI   Resp neg  CV neg    PHYSICAL EXAMINATION    Physical Exam  Constitutional  No distress  Cardiovascular Rate  normal . Rhythm: regular . Heart sounds:  normal  Pulmonary/Chest  Effort normal. Breath sounds:  normal  Musc tenderness of the right flank with palpation  Skin skin horn, skin lesion, yellowing and bruising of the right flank  Psychiatric  Alert. Judgment and thought content normal. Mood normal     REVIEWED DATA:    Labs:   Admission on 09/06/2022, Discharged on 09/06/2022   Component Date Value Ref Range Status   • Glucose 09/06/2022 134 (A) 65 - 99 mg/dL Final   • BUN 09/06/2022 28 (A) 8 - 23 mg/dL Final   • Creatinine 09/06/2022 1.80 (A) 0.76 - 1.27 mg/dL Final   • Sodium 09/06/2022 138  136 - 145 mmol/L Final   • Potassium  09/06/2022 4.2  3.5 - 5.2 mmol/L Final   • Chloride 09/06/2022 104  98 - 107 mmol/L Final   • CO2 09/06/2022 22.0  22.0 - 29.0 mmol/L Final   • Calcium 09/06/2022 9.5  8.6 - 10.5 mg/dL Final   • Total Protein 09/06/2022 7.0  6.0 - 8.5 g/dL Final   • Albumin 09/06/2022 3.90  3.50 - 5.20 g/dL Final   • ALT (SGPT) 09/06/2022 17  1 - 41 U/L Final   • AST (SGOT) 09/06/2022 13  1 - 40 U/L Final   • Alkaline Phosphatase 09/06/2022 94  39 - 117 U/L Final   • Total Bilirubin 09/06/2022 0.5  0.0 - 1.2 mg/dL Final   • Globulin 09/06/2022 3.1  gm/dL Final   • A/G Ratio 09/06/2022 1.3  g/dL Final   • BUN/Creatinine Ratio 09/06/2022 15.6  7.0 - 25.0 Final   • Anion Gap 09/06/2022 12.0  5.0 - 15.0 mmol/L Final   • eGFR 09/06/2022 37.8 (A) >60.0 mL/min/1.73 Final    National Kidney Foundation and American Society of Nephrology (ASN) Task Force recommended calculation based on the Chronic Kidney Disease Epidemiology Collaboration (CKD-EPI) equation refit without adjustment for race.   • Lipase 09/06/2022 17  13 - 60 U/L Final   • Color, UA 09/06/2022 Yellow  Yellow, Straw Final   • Appearance, UA 09/06/2022 Clear  Clear Final   • pH, UA 09/06/2022 6.5  5.0 - 8.0 Final   • Specific Gravity, UA 09/06/2022 1.019  1.005 - 1.030 Final   • Glucose, UA 09/06/2022 Negative  Negative Final   • Ketones, UA 09/06/2022 Negative  Negative Final   • Bilirubin, UA 09/06/2022 Negative  Negative Final   • Blood, UA 09/06/2022 Negative  Negative Final   • Protein, UA 09/06/2022 Trace (A) Negative Final   • Leuk Esterase, UA 09/06/2022 Negative  Negative Final   • Nitrite, UA 09/06/2022 Negative  Negative Final   • Urobilinogen, UA 09/06/2022 0.2 E.U./dL  0.2 - 1.0 E.U./dL Final   • WBC 09/06/2022 8.23  3.40 - 10.80 10*3/mm3 Final   • RBC 09/06/2022 4.44  4.14 - 5.80 10*6/mm3 Final   • Hemoglobin 09/06/2022 14.3  13.0 - 17.7 g/dL Final   • Hematocrit 09/06/2022 41.8  37.5 - 51.0 % Final   • MCV 09/06/2022 94.1  79.0 - 97.0 fL Final   • MCH 09/06/2022  32.2  26.6 - 33.0 pg Final   • MCHC 09/06/2022 34.2  31.5 - 35.7 g/dL Final   • RDW 09/06/2022 11.5 (A) 12.3 - 15.4 % Final   • RDW-SD 09/06/2022 39.9  37.0 - 54.0 fl Final   • MPV 09/06/2022 10.2  6.0 - 12.0 fL Final   • Platelets 09/06/2022 246  140 - 450 10*3/mm3 Final   • Neutrophil % 09/06/2022 72.9  42.7 - 76.0 % Final   • Lymphocyte % 09/06/2022 17.4 (A) 19.6 - 45.3 % Final   • Monocyte % 09/06/2022 6.7  5.0 - 12.0 % Final   • Eosinophil % 09/06/2022 2.3  0.3 - 6.2 % Final   • Basophil % 09/06/2022 0.5  0.0 - 1.5 % Final   • Immature Grans % 09/06/2022 0.2  0.0 - 0.5 % Final   • Neutrophils, Absolute 09/06/2022 6.00  1.70 - 7.00 10*3/mm3 Final   • Lymphocytes, Absolute 09/06/2022 1.43  0.70 - 3.10 10*3/mm3 Final   • Monocytes, Absolute 09/06/2022 0.55  0.10 - 0.90 10*3/mm3 Final   • Eosinophils, Absolute 09/06/2022 0.19  0.00 - 0.40 10*3/mm3 Final   • Basophils, Absolute 09/06/2022 0.04  0.00 - 0.20 10*3/mm3 Final   • Immature Grans, Absolute 09/06/2022 0.02  0.00 - 0.05 10*3/mm3 Final   • nRBC 09/06/2022 0.0  0.0 - 0.2 /100 WBC Final   • Extra Tube 09/06/2022 Hold for add-ons.   Final    Auto resulted.   • Extra Tube 09/06/2022 hold for add-on   Final    Auto resulted   • Extra Tube 09/06/2022 Hold for add-ons.   Final    Auto resulted       Imaging:   CT Abdomen Pelvis Without Contrast    Result Date: 9/7/2022  Narrative: CT ABDOMEN AND PELVIS WITHOUT IV CONTRAST  HISTORY: 81-year-old male with abdominal pain. Right-sided flank pain after a fall.  TECHNIQUE: Radiation dose reduction techniques were utilized, including automated exposure control and exposure modulation based on body size. 3 mm images were obtained through the abdomen and pelvis without the administration of IV contrast. There is no previous CT for comparison.  FINDINGS: There is a hairline nondisplaced fracture at the posterior aspect of the right 11th rib, image 65. Noncontrasted liver, gallbladder, spleen, pancreas, adrenals, and kidneys  appear unremarkable. There are no renal or ureteral stones and there is no hydronephrosis bilaterally. No acute bowel abnormality is seen. The appendix appears normal. There is no free fluid or lymphadenopathy. There are moderate abdominal aortic atherosclerotic changes without aneurysmal dilatation.      Impression: Hairline nondisplaced fracture at the posterior aspect of the right 11th rib.  Discussed with Dr. Keating.  This report was finalized on 9/7/2022 4:59 PM by Dr. Linda Raygoza M.D.         Medical Tests:        Summary of old records / correspondence / consultant report:   DC summary re: issues addressed on HPI    Request outside records:       ALLERGIES  No Known Allergies     MEDICATIONS   Current Outpatient Medications   Medication Sig Dispense Refill   • acetaminophen (TYLENOL) 500 MG tablet Take 1,000 mg by mouth At Night As Needed.     • ASPIRIN 81 PO Take 81 mg by mouth Daily.     • atorvastatin (LIPITOR) 20 MG tablet TAKE ONE TABLET BY MOUTH DAILY 30 tablet 11   • carvedilol (COREG) 6.25 MG tablet TAKE ONE TABLET BY MOUTH TWICE A DAY WITH MEALS 180 tablet 3   • cetirizine (zyrTEC) 10 MG tablet Take 10 mg by mouth Daily.     • coenzyme Q10 100 MG capsule Take 100 mg by mouth Daily.     • diphenhydrAMINE (BENADRYL) 25 MG tablet Take 25 mg by mouth At Night As Needed for Allergies.     • fluticasone (FLONASE) 50 MCG/ACT nasal spray 2 sprays into the nostril(s) as directed by provider Daily As Needed.     • lidocaine (Lidoderm) 5 % Place 1 patch on the skin as directed by provider Daily. Remove & Discard patch within 12 hours or as directed by MD 10 patch 0   • lisinopril-hydrochlorothiazide (PRINZIDE,ZESTORETIC) 20-12.5 MG per tablet Take 1 tablet by mouth Daily.     • Multiple Vitamins-Minerals (MULTIVITAMIN ADULT PO) Take  by mouth Daily.     • vitamin C (ASCORBIC ACID) 500 MG tablet Take 500 mg by mouth Daily.     • HYDROcodone-acetaminophen (Norco)  MG per tablet Take 1 tablet by mouth Every 6  (Six) Hours As Needed for Moderate Pain. 12 tablet 0   • ibuprofen (ADVIL,MOTRIN) 400 MG tablet Take 400 mg by mouth Every Night.       No current facility-administered medications for this visit.       Current outpatient and discharge medications have been reconciled for the patient.  Reviewed by: EVELIA Rosario       [facility]:     The following portions of the patient's history were reviewed and updated as appropriate: Allergies / Current Medications / Past Medical History / Surgical History / Social History / Family History    PROBLEM LIST   Patient Active Problem List   Diagnosis   • Benign essential hypertension   • Gastroesophageal reflux disease   • Hyperglycemia   • Hyperlipidemia   • Nocturia   • Renal insufficiency   • Cerebrovascular accident (HCC)   • Type 2 diabetes mellitus (HCC)   • Benign hypertension with chronic kidney disease   • Stage 3b chronic kidney disease (HCC)   • Edema of lower extremity   • Dyslipidemia   • History of cerebrovascular accident       PAST MEDICAL HISTORY  Past Medical History:   Diagnosis Date   • Diabetes mellitus (HCC)    • Hypertension    • Renal insufficiency        SURGICAL HISTORY  History reviewed. No pertinent surgical history.    SOCIAL HISTORY  Social History     Socioeconomic History   • Marital status: Single   Tobacco Use   • Smoking status: Former Smoker     Packs/day: 1.00     Years: 23.00     Pack years: 23.00     Types: Cigarettes     Start date:      Quit date:      Years since quittin.7   • Smokeless tobacco: Never Used   Vaping Use   • Vaping Use: Never used   Substance and Sexual Activity   • Alcohol use: Yes     Comment: occassional    • Drug use: Never   • Sexual activity: Defer       FAMILY HISTORY  Family History   Problem Relation Age of Onset   • Stroke Mother    • Heart attack Father    • Stroke Brother    • Diverticulitis Brother    • Hypertension Brother        Examiner was wearing KN95 mask, face shield and exam gloves during the  entire duration of the visit. Patient was masked the entire time.   Minimum social distance of 6 ft maintained entire visit except if physical contact was necessary as documented.     **Dragon Disclaimer:   Much of this encounter note is an electronic transcription/translation of spoken language to printed text. The electronic translation of spoken language may permit erroneous, or at times, nonsensical words or phrases to be inadvertently transcribed. Although I have reviewed the note for such errors, some may still exist.

## 2022-09-09 LAB — SPECIMEN STATUS: NORMAL

## 2022-09-10 ENCOUNTER — PATIENT OUTREACH (OUTPATIENT)
Dept: CASE MANAGEMENT | Facility: OTHER | Age: 82
End: 2022-09-10

## 2022-09-10 NOTE — OUTREACH NOTE
AMBULATORY CASE MANAGEMENT NOTE    Name and Relationship of Patient/Support Person: Reinaldo Macedo - Emergency Contact    Patient Outreach    Introduced self, explained ACM RN role and provided contact information. Spoke with Reinaldo. Patient's pain medications assisted with pain management. No problems with obtaining or administering. Follow up scheduled in 2 weeks to review continued needs and SDOH.     Education Documentation  No documentation found.        JEFF LOCO  Ambulatory Case Management    9/10/2022, 15:26 EDT

## 2022-09-13 LAB
AMPHETAMINES SERPL QL SCN: NEGATIVE NG/ML
BARBITURATES SERPL QL SCN: NEGATIVE UG/ML
BENZODIAZ SERPL QL SCN: NEGATIVE NG/ML
CANNABINOIDS SERPL QL SCN: NEGATIVE NG/ML
COCAINE+BZE SERPL QL SCN: NEGATIVE NG/ML
ETHANOL SERPL-MCNC: NEGATIVE GM/DL
METHADONE SERPL QL SCN: NEGATIVE NG/ML
OPIATES SERPL QL SCN: NEGATIVE NG/ML
OXYCODONE+OXYMORPHONE SERPLBLD QL SCN: NEGATIVE NG/ML
PCP SERPL QL SCN: NEGATIVE NG/ML
PROPOXYPH SERPL QL SCN: NEGATIVE NG/ML

## 2022-09-28 ENCOUNTER — PATIENT OUTREACH (OUTPATIENT)
Dept: CASE MANAGEMENT | Facility: OTHER | Age: 82
End: 2022-09-28

## 2022-09-28 NOTE — OUTREACH NOTE
AMBULATORY CASE MANAGEMENT NOTE    Name and Relationship of Patient/Support Person: Reinaldo Macedo - Emergency Contact    Patient Outreach    Introduced self, explained ACM RN role and provided contact information. Patient reportedly doing very well. No SDOH needs per brotherReinaldo. No further outreach scheduled at this time.     SDOH updated and reviewed with the patient during this program:  Financial Resource Strain: Low Risk    • Difficulty of Paying Living Expenses: Not hard at all      Food Insecurity: No Food Insecurity   • Worried About Running Out of Food in the Last Year: Never true   • Ran Out of Food in the Last Year: Never true      Transportation Needs: No Transportation Needs   • Lack of Transportation (Medical): No   • Lack of Transportation (Non-Medical): No       Education Documentation  No documentation found.        JEFF LOCO  Ambulatory Case Management    9/28/2022, 14:26 EDT

## 2022-10-25 RX ORDER — CARVEDILOL 6.25 MG/1
TABLET ORAL
Qty: 180 TABLET | Refills: 3 | Status: SHIPPED | OUTPATIENT
Start: 2022-10-25

## 2022-11-03 ENCOUNTER — TELEPHONE (OUTPATIENT)
Dept: INTERNAL MEDICINE | Age: 82
End: 2022-11-03

## 2022-11-03 NOTE — TELEPHONE ENCOUNTER
Caller: Macedo,Sendy    Relationship: Emergency Contact    Best call back number: [phone]    What is the best time to reach you: AFTER 2 PM 11/4/2022    Who are you requesting to speak with (clinical staff, provider,  specific staff member): PRACTICE MANAGER    Do you know the name of the person who called: SENDY    What was the call regarding:     SENDY SAYS SHE  HAS SPOKEN TO INSURANCE COMPANY, AS WELL AS [employer] BILLING DEPARTMENT. [facility] CURRENTLY HAS [facility] (NON PAR, PLAN [employer] DOES NOT TAKE AS OF 2020 LIST)      SENDY HAS SPOKEN TO  REGARDING [facility] BEING A PARTICIPANT WITH THIS INSURANCE. SHE IS WANTING A CALL BACK WITH INFORMATION REGARDING  EDI 11/17/2022 APPOINTMENT, WHETHER OR NOT IT WILL BE COVERED.        Do you require a callback: YES      DELETE AFTER READING TO PATIENT: “ Thank you for sharing this information with me. I will send a message to the . Please allow up to 48 hours for the  to follow up on this request.”

## 2022-11-04 NOTE — TELEPHONE ENCOUNTER
I spoke with Sendy and let her know that we are non-par with [facility]. She is going to talk with their  and see about switching to a plan that is participating with [facility] and [place] his [place].  I advised her if she has any other questions to let us know, and also to make sure that [facility] [place] is participating with the plan they want to switch to.

## 2022-12-13 ENCOUNTER — OFFICE VISIT (OUTPATIENT)
Dept: INTERNAL MEDICINE | Age: 82
End: 2022-12-13

## 2022-12-13 VITALS
SYSTOLIC BLOOD PRESSURE: 120 MMHG | OXYGEN SATURATION: 98 % | DIASTOLIC BLOOD PRESSURE: 70 MMHG | TEMPERATURE: 97.1 F | WEIGHT: 185 LBS | HEIGHT: 70 IN | HEART RATE: 76 BPM | BODY MASS INDEX: 26.48 KG/M2

## 2022-12-13 DIAGNOSIS — E78.5 DYSLIPIDEMIA: ICD-10-CM

## 2022-12-13 DIAGNOSIS — E11.9 TYPE 2 DIABETES MELLITUS WITHOUT COMPLICATION, WITHOUT LONG-TERM CURRENT USE OF INSULIN: ICD-10-CM

## 2022-12-13 DIAGNOSIS — E78.2 MIXED HYPERLIPIDEMIA: ICD-10-CM

## 2022-12-13 DIAGNOSIS — N18.32 STAGE 3B CHRONIC KIDNEY DISEASE: ICD-10-CM

## 2022-12-13 DIAGNOSIS — I12.9 BENIGN HYPERTENSION WITH CHRONIC KIDNEY DISEASE: Primary | ICD-10-CM

## 2022-12-13 PROCEDURE — 99214 OFFICE O/P EST MOD 30 MIN: CPT | Performed by: NURSE PRACTITIONER

## 2022-12-13 NOTE — PROGRESS NOTES
I N T E R N A L  M E D I C I N E  CESAR BROWNEVELIA      ENCOUNTER DATE:  12/13/2022    Benja Macedo / 82 y.o. / male      CHIEF COMPLAINT / REASON FOR OFFICE VISIT     Diabetes, Hyperlipidemia, and Hypertension      ASSESSMENT & PLAN     Problem List Items Addressed This Visit        Cardiac and Vasculature    Hyperlipidemia    Relevant Medications    atorvastatin (LIPITOR) 20 MG tablet    Benign hypertension with chronic kidney disease - Primary    Relevant Medications    lisinopril-hydrochlorothiazide (PRINZIDE,ZESTORETIC) 20-12.5 MG per tablet    carvedilol (COREG) 6.25 MG tablet    Other Relevant Orders    Comprehensive Metabolic Panel    CBC w AUTO Differential    TSH+Free T4    Dyslipidemia    Relevant Orders    Lipid Panel With / Chol / HDL Ratio       Endocrine and Metabolic    Type 2 diabetes mellitus (HCC)    Current Assessment & Plan     Controlled with diet         Relevant Orders    Hemoglobin A1c    Urinalysis With Microscopic If Indicated (No Culture) - Urine, Clean Catch    Microalbumin / Creatinine Urine Ratio - Urine, Clean Catch       Genitourinary and Reproductive     Stage 3b chronic kidney disease (HCC)    Current Assessment & Plan     Stable every 6 month follow-up with nephrology         Relevant Orders    Urinalysis With Microscopic If Indicated (No Culture) - Urine, Clean Catch     Orders Placed This Encounter   Procedures   • Comprehensive Metabolic Panel   • Lipid Panel With / Chol / HDL Ratio   • Hemoglobin A1c   • TSH+Free T4   • Urinalysis With Microscopic If Indicated (No Culture) - Urine, Clean Catch   • Microalbumin / Creatinine Urine Ratio - Urine, Clean Catch   • CBC w AUTO Differential     No orders of the defined types were placed in this encounter.      SUMMARY/DISCUSSION  • Follow-up in 6 months for chronic medical, earlier if needed      Next Appointment with me: Visit date not found    Return in about 6 months (around 6/13/2023) for Next scheduled follow up; 1 -2  "weeks lab only.      VITAL SIGNS     Visit Vitals  /70 (Cuff Size: Adult)   Pulse 76   Temp 97.1 °F (36.2 °C) (Temporal)   Ht 177.8 cm (70\")   Wt 83.9 kg (185 lb) Comment: stated   SpO2 98%   BMI 26.54 kg/m²       Wt Readings from Last 3 Encounters:   12/13/22 83.9 kg (185 lb)   09/08/22 85.3 kg (188 lb)   05/17/22 83 kg (183 lb)     Body mass index is 26.54 kg/m².      MEDICATIONS AT THE TIME OF OFFICE VISIT     Current Outpatient Medications on File Prior to Visit   Medication Sig   • acetaminophen (TYLENOL) 500 MG tablet Take 1,000 mg by mouth At Night As Needed.   • ASPIRIN 81 PO Take 81 mg by mouth Daily.   • atorvastatin (LIPITOR) 20 MG tablet TAKE ONE TABLET BY MOUTH DAILY   • carvedilol (COREG) 6.25 MG tablet TAKE ONE TABLET BY MOUTH TWICE A DAY WITH MEALS   • cetirizine (zyrTEC) 10 MG tablet Take 10 mg by mouth Daily.   • coenzyme Q10 100 MG capsule Take 100 mg by mouth Daily.   • diphenhydrAMINE (BENADRYL) 25 MG tablet Take 25 mg by mouth At Night As Needed for Allergies.   • fluticasone (FLONASE) 50 MCG/ACT nasal spray 2 sprays into the nostril(s) as directed by provider Daily As Needed.   • lisinopril-hydrochlorothiazide (PRINZIDE,ZESTORETIC) 20-12.5 MG per tablet Take 1 tablet by mouth Daily.   • Multiple Vitamins-Minerals (MULTIVITAMIN ADULT PO) Take  by mouth Daily.   • vitamin C (ASCORBIC ACID) 500 MG tablet Take 500 mg by mouth Daily.   • HYDROcodone-acetaminophen (Norco)  MG per tablet Take 1 tablet by mouth Every 6 (Six) Hours As Needed for Moderate Pain.   • ibuprofen (ADVIL,MOTRIN) 400 MG tablet Take 400 mg by mouth Every Night.   • lidocaine (Lidoderm) 5 % Place 1 patch on the skin as directed by provider Daily. Remove & Discard patch within 12 hours or as directed by MD     No current facility-administered medications on file prior to visit.          HISTORY OF PRESENT ILLNESS     Patient presents for 4-month follow-up on type 2 diabetes, hyperlipidemia, hypertension and anemia. "      Hyperlipidemia: Atorvastatin 20 mg well-controlled cholesterol but elevation in triglycerides at 150.  No myalgias with medication.     Diabetes mellitus type II: Deviously on Metformin but last hemoglobin A1c 6.3. No longer on medication controlled with diet. No polyuria, polydipsia or peripheral neuropathy.     Hypertension: Is now following nephrology chronic kidney disease.  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  Baseline creatinine running around 1.7-1.8.  May 2022 with creatinine 1.9.  September 2022 creatinine back down to 1.8.  Nephrology has allowed patient to continue on lisinopril hydrochlorothiazide 20-12.5 mg daily along with carvedilol 6.25 twice daily for hypertension.    REVIEW OF SYSTEMS     Constitutional neg except per HPI   Resp neg  CV neg    PHYSICAL EXAMINATION     Physical Exam  Constitutional  No distress  Cardiovascular Rate  normal . Rhythm: regular . Heart sounds:  normal  Chronic lower extremity edema right greater than left  Pulmonary/Chest  Effort normal. Breath sounds:  normal  Psychiatric  Alert. Judgment and thought content normal. Mood normal     REVIEWED DATA     Labs:   Lab Results   Component Value Date    GLUCOSE 134 (H) 09/06/2022    BUN 28 (H) 09/06/2022    CREATININE 1.80 (H) 09/06/2022    EGFRIFNONA 40 (L) 08/31/2021    EGFRIFAFRI 48 (L) 08/31/2021    BCR 15.6 09/06/2022    K 4.2 09/06/2022    CO2 22.0 09/06/2022    CALCIUM 9.5 09/06/2022    PROTENTOTREF 7.3 05/17/2022    ALBUMIN 3.90 09/06/2022    LABIL2 2.0 05/17/2022    AST 13 09/06/2022    ALT 17 09/06/2022     Lab Results   Component Value Date    WBC 8.23 09/06/2022    HGB 14.3 09/06/2022    HCT 41.8 09/06/2022    MCV 94.1 09/06/2022     09/06/2022     Lab Results   Component Value Date    TSH 1.020 05/17/2022     Lab Results   Component Value Date    HGBA1C 6.3 (H) 05/17/2022     Lab Results   Component Value Date    CHLPL  137 05/17/2022    TRIG 150 (H) 05/17/2022    HDL 32 (L) 05/17/2022    LDL 79 05/17/2022     Brief Urine Lab Results  (Last result in the past 365 days)      Color   Clarity   Blood   Leuk Est   Nitrite   Protein   CREAT   Urine HCG        09/06/22 1252 Yellow   Clear   Negative   Negative   Negative   Trace               Imaging:           Medical Tests:           Summary of old records / correspondence / consultant report:           Request outside records:             *Examiner was wearing medical surgical mask    **Dragon dictation used for documentation.

## 2023-01-03 DIAGNOSIS — E11.9 DIABETES MELLITUS WITHOUT COMPLICATION: Primary | ICD-10-CM

## 2023-01-04 ENCOUNTER — TELEPHONE (OUTPATIENT)
Dept: INTERNAL MEDICINE | Age: 83
End: 2023-01-04
Payer: MEDICARE

## 2023-01-04 LAB
APPEARANCE UR: CLEAR
BILIRUB UR QL STRIP: NEGATIVE
COLOR UR: YELLOW
GLUCOSE UR QL STRIP: NEGATIVE
HGB UR QL STRIP: NEGATIVE
KETONES UR QL STRIP: NEGATIVE
LEUKOCYTE ESTERASE UR QL STRIP: NEGATIVE
MICROALBUMIN UR-MCNC: <3 UG/ML
NITRITE UR QL STRIP: NEGATIVE
PH UR STRIP: 5.5 [PH] (ref 5–8)
PROT UR QL STRIP: NEGATIVE
SP GR UR STRIP: 1.02 (ref 1–1.03)
UROBILINOGEN UR STRIP-MCNC: NORMAL MG/DL

## 2023-01-04 NOTE — TELEPHONE ENCOUNTER
ARLEYTVM INSTRUCTING PT TO RETURN CALL TO BE READ LAB RESULTS. LETTER SENT VIA [employer]/MAILED. MM

## 2023-01-04 NOTE — TELEPHONE ENCOUNTER
----- Message from EVELIA Rosario sent at 1/2/2023 12:18 PM EST -----  Stable chronic kidney disease.   Mild decrease in red blood cells with no decrease in hemoglobin, we will monitor for now. Chronic kidney disease may contribute, but based on labs I would like to check B12 levels as well at next office visit.   Blood sugar controlled with diet.   Thyroid is in normal range.   Elevation in cholesterol compared to 7 months prior. If elevated at next office visit we will increase dose of statin, for no limit fats, sugars, and carbohydrates.

## 2023-01-10 ENCOUNTER — OFFICE VISIT (OUTPATIENT)
Dept: INTERNAL MEDICINE | Age: 83
End: 2023-01-10
Payer: MEDICARE

## 2023-01-10 ENCOUNTER — HOSPITAL ENCOUNTER (OUTPATIENT)
Dept: GENERAL RADIOLOGY | Facility: HOSPITAL | Age: 83
Discharge: HOME | End: 2023-01-10
Admitting: NURSE PRACTITIONER
Payer: MEDICARE

## 2023-01-10 VITALS
WEIGHT: 185 LBS | DIASTOLIC BLOOD PRESSURE: 68 MMHG | HEART RATE: 58 BPM | OXYGEN SATURATION: 98 % | SYSTOLIC BLOOD PRESSURE: 122 MMHG | BODY MASS INDEX: 26.48 KG/M2 | TEMPERATURE: 98.9 F | HEIGHT: 70 IN

## 2023-01-10 DIAGNOSIS — M25.512 PAIN OF LEFT SHOULDER JOINT ON MOVEMENT: Primary | ICD-10-CM

## 2023-01-10 PROCEDURE — 99213 OFFICE O/P EST LOW 20 MIN: CPT | Performed by: NURSE PRACTITIONER

## 2023-01-10 PROCEDURE — 73030 X-RAY EXAM OF SHOULDER: CPT

## 2023-01-10 RX ORDER — CYCLOBENZAPRINE HCL 10 MG
10 TABLET ORAL 3 TIMES DAILY PRN
Qty: 30 TABLET | Refills: 0 | Status: SHIPPED | OUTPATIENT
Start: 2023-01-10 | End: 2023-02-07

## 2023-01-10 NOTE — PROGRESS NOTES
"    I N T E R N A L  M E D I C I N E  EVELIA Pino    ENCOUNTER DATE:  01/10/2023    Benja Macedo / 82 y.o. / male      CHIEF COMPLAINT / REASON FOR OFFICE VISIT     arm pain (Week to 10 days, left arm, no chest pain or tightness no shortness of breath )      ASSESSMENT & PLAN     Diagnoses and all orders for this visit:    1. Pain of left shoulder joint on movement (Primary)  -     XR Shoulder 2+ View Left  -     MRI Shoulder Left Without Contrast; Future    Other orders  -     cyclobenzaprine (FLEXERIL) 10 MG tablet; Take 1 tablet by mouth 3 (Three) Times a Day As Needed for Muscle Spasms.  Dispense: 30 tablet; Refill: 0         SUMMARY/DISCUSSION  • Discussion of left shoulder pain and possibility of bursitis vs tear. Xray ordered. Will also order MRI for evaluation.  • Follow up with EVELIA Rosario as scheduled and as needed  • I spent 25 min in direct care of this patient on this date of service. This time includes times spent by me in the following activities: Preparing for the visit, obtaining and/or reviewing a separately obtained history, performing a medically appropriate examination and/or evaluation, reviewing medical records, reviewing tests, ordering medications, tests, or procedures, counseling and educating the patient, documenting information in the medical record and reviewing office note/correspondence from other providers.     Return in about 3 months (around 4/10/2023) for Next scheduled follow up.      VITAL SIGNS     Visit Vitals  /68 (BP Location: Right arm, Patient Position: Sitting, Cuff Size: Adult)   Pulse 58   Temp 98.9 °F (37.2 °C) (Temporal)   Ht 177.8 cm (70\")   Wt 83.9 kg (185 lb)   SpO2 98%   BMI 26.54 kg/m²           BP Readings from Last 3 Encounters:   01/10/23 122/68   12/13/22 120/70   09/08/22 120/62     Wt Readings from Last 3 Encounters:   01/10/23 83.9 kg (185 lb)   12/13/22 83.9 kg (185 lb)   09/08/22 85.3 kg (188 lb)     Body mass index is 26.54 " kg/m².    Blood pressure readings recorded on patient flowsheet:  No flowsheet data found.          MEDICATIONS AT THE TIME OF OFFICE VISIT     Current Outpatient Medications on File Prior to Visit   Medication Sig Dispense Refill   • acetaminophen (TYLENOL) 500 MG tablet Take 1,000 mg by mouth At Night As Needed.     • ASPIRIN 81 PO Take 81 mg by mouth Daily.     • atorvastatin (LIPITOR) 20 MG tablet TAKE ONE TABLET BY MOUTH DAILY 30 tablet 11   • carvedilol (COREG) 6.25 MG tablet TAKE ONE TABLET BY MOUTH TWICE A DAY WITH MEALS 180 tablet 3   • cetirizine (zyrTEC) 10 MG tablet Take 10 mg by mouth Daily.     • coenzyme Q10 100 MG capsule Take 100 mg by mouth Daily.     • diphenhydrAMINE (BENADRYL) 25 MG tablet Take 25 mg by mouth At Night As Needed for Allergies.     • fluticasone (FLONASE) 50 MCG/ACT nasal spray 2 sprays into the nostril(s) as directed by provider Daily As Needed.     • ibuprofen (ADVIL,MOTRIN) 400 MG tablet Take 400 mg by mouth Every Night.     • lisinopril-hydrochlorothiazide (PRINZIDE,ZESTORETIC) 20-12.5 MG per tablet Take 1 tablet by mouth Daily.     • Multiple Vitamins-Minerals (MULTIVITAMIN ADULT PO) Take  by mouth Daily.     • vitamin C (ASCORBIC ACID) 500 MG tablet Take 500 mg by mouth Daily.     • HYDROcodone-acetaminophen (Norco)  MG per tablet Take 1 tablet by mouth Every 6 (Six) Hours As Needed for Moderate Pain. 12 tablet 0   • lidocaine (Lidoderm) 5 % Place 1 patch on the skin as directed by provider Daily. Remove & Discard patch within 12 hours or as directed by MD 10 patch 0     No current facility-administered medications on file prior to visit.        HISTORY OF PRESENT ILLNESS     82-year-old male being seen today for complaints of left shoulder and arm  with joint pain and swelling.  Pain assessed with abduction and abduction.  States has been going on for about 10 days.  Patient with limited mobility of left arm.  States did fall in the shower several months ago but no  complaints about pain in his shoulder until now. Patient denies any chest pain, chest tightness or increased shortness of breath.  Patient states he has been taking Tylenol as needed for pain with some relief.  Patient states has used Flexeril in the past with good relief.    Patient Care Team:  Yo Mckeon APRN as PCP - General (Internal Medicine)  Luz Marina Solano MD as Consulting Physician (Nephrology)    REVIEW OF SYSTEMS     Review of Systems   Constitutional: Positive for activity change.   Respiratory: Negative.    Cardiovascular: Negative.    Musculoskeletal: Positive for arthralgias and joint swelling.   Skin: Negative.    Psychiatric/Behavioral: Negative.           PHYSICAL EXAMINATION     Physical Exam  Cardiovascular:      Rate and Rhythm: Normal rate and regular rhythm.      Pulses: Normal pulses.      Heart sounds: Normal heart sounds.   Pulmonary:      Effort: Pulmonary effort is normal.      Breath sounds: Normal breath sounds.   Musculoskeletal:         General: Swelling, tenderness and signs of injury present.      Comments: Patient with limited mobility in left shoulder and arm with pain with with adduction and abduction, swelling noted upper left arm    Skin:     General: Skin is warm and dry.   Neurological:      Mental Status: He is alert. Mental status is at baseline.             REVIEWED DATA     Labs:     Lab Results   Component Value Date     12/30/2022    K 4.6 12/30/2022    CALCIUM 10.2 12/30/2022    AST 15 12/30/2022    ALT 18 12/30/2022    BUN 33 (H) 12/30/2022    CREATININE 1.79 (H) 12/30/2022    CREATININE 1.80 (H) 09/06/2022    CREATININE 1.91 (H) 05/17/2022    EGFRIFNONA 40 (L) 08/31/2021    EGFRIFAFRI 48 (L) 08/31/2021       Lab Results   Component Value Date    HGBA1C 6.1 (H) 12/30/2022    HGBA1C 6.3 (H) 05/17/2022    HGBA1C 6.30 (H) 08/31/2021       Lab Results   Component Value Date     (H) 12/30/2022    LDL 79 05/17/2022    LDL 87 08/31/2021    HDL 39 (L)  12/30/2022    HDL 32 (L) 05/17/2022    TRIG 202 (H) 12/30/2022    TRIG 150 (H) 05/17/2022       Lab Results   Component Value Date    TSH 1.160 12/30/2022    TSH 1.020 05/17/2022    TSH 1.200 08/31/2021    FREET4 1.49 12/30/2022    FREET4 1.57 05/17/2022    FREET4 1.34 08/31/2021       Lab Results   Component Value Date    WBC 7.0 12/30/2022    HGB 13.1 12/30/2022     12/30/2022       No results found for: MALBCRERATIO       Imaging:           Medical Tests:           Summary of old records / correspondence / consultant report:           Request outside records:           EVELIA Pino

## 2023-01-11 PROBLEM — M25.512 PAIN OF LEFT SHOULDER JOINT ON MOVEMENT: Status: ACTIVE | Noted: 2023-01-11

## 2023-01-12 DIAGNOSIS — M19.90 OSTEOARTHRITIS, UNSPECIFIED OSTEOARTHRITIS TYPE, UNSPECIFIED SITE: Primary | ICD-10-CM

## 2023-01-30 ENCOUNTER — OFFICE VISIT (OUTPATIENT)
Dept: ORTHOPEDIC SURGERY | Facility: CLINIC | Age: 83
End: 2023-01-30
Payer: MEDICARE

## 2023-01-30 VITALS — WEIGHT: 185 LBS | TEMPERATURE: 97.1 F | BODY MASS INDEX: 26.48 KG/M2 | HEIGHT: 70 IN

## 2023-01-30 DIAGNOSIS — M19.019 GLENOHUMERAL ARTHRITIS: Primary | ICD-10-CM

## 2023-01-30 DIAGNOSIS — S46.012A TRAUMATIC TEAR OF LEFT ROTATOR CUFF, UNSPECIFIED TEAR EXTENT, INITIAL ENCOUNTER: ICD-10-CM

## 2023-01-30 PROCEDURE — 20610 DRAIN/INJ JOINT/BURSA W/O US: CPT | Performed by: ORTHOPAEDIC SURGERY

## 2023-01-30 PROCEDURE — 99204 OFFICE O/P NEW MOD 45 MIN: CPT | Performed by: ORTHOPAEDIC SURGERY

## 2023-01-30 RX ORDER — METHYLPREDNISOLONE ACETATE 80 MG/ML
80 INJECTION, SUSPENSION INTRA-ARTICULAR; INTRALESIONAL; INTRAMUSCULAR; SOFT TISSUE
Status: COMPLETED | OUTPATIENT
Start: 2023-01-30 | End: 2023-01-30

## 2023-01-30 RX ORDER — LIDOCAINE HYDROCHLORIDE 10 MG/ML
2 INJECTION, SOLUTION EPIDURAL; INFILTRATION; INTRACAUDAL; PERINEURAL
Status: COMPLETED | OUTPATIENT
Start: 2023-01-30 | End: 2023-01-30

## 2023-01-30 RX ADMIN — LIDOCAINE HYDROCHLORIDE 2 ML: 10 INJECTION, SOLUTION EPIDURAL; INFILTRATION; INTRACAUDAL; PERINEURAL at 14:44

## 2023-01-30 RX ADMIN — METHYLPREDNISOLONE ACETATE 80 MG: 80 INJECTION, SUSPENSION INTRA-ARTICULAR; INTRALESIONAL; INTRAMUSCULAR; SOFT TISSUE at 14:44

## 2023-01-30 NOTE — PROGRESS NOTES
New Shoulder      Patient: Benja Macedo        YOB: 1940    Medical Record Number: [id number]        Chief Complaints: Left shoulder pain      History of Present Illness: This is a 82-year-old male who is right-hand dominant until his stroke in 2013 now he does most things with his left hand he does use a walke but around September he fell in the bathroom and has had pain in that left shoulder since that time.  He is here with his sister-in-law symptoms are moderate intermittent aching worse with activity somewhat better with rest past medical history is marked for diabetes hypertension renal insufficiency      Allergies: No Known Allergies    Medications:   Home Medications:  Current Outpatient Medications on File Prior to Visit   Medication Sig   • acetaminophen (TYLENOL) 500 MG tablet Take 1,000 mg by mouth At Night As Needed.   • ASPIRIN 81 PO Take 81 mg by mouth Daily.   • atorvastatin (LIPITOR) 20 MG tablet TAKE ONE TABLET BY MOUTH DAILY   • carvedilol (COREG) 6.25 MG tablet TAKE ONE TABLET BY MOUTH TWICE A DAY WITH MEALS   • cetirizine (zyrTEC) 10 MG tablet Take 10 mg by mouth Daily.   • coenzyme Q10 100 MG capsule Take 100 mg by mouth Daily.   • cyclobenzaprine (FLEXERIL) 10 MG tablet Take 1 tablet by mouth 3 (Three) Times a Day As Needed for Muscle Spasms.   • diphenhydrAMINE (BENADRYL) 25 MG tablet Take 25 mg by mouth At Night As Needed for Allergies.   • fluticasone (FLONASE) 50 MCG/ACT nasal spray 2 sprays into the nostril(s) as directed by provider Daily As Needed.   • HYDROcodone-acetaminophen (Norco)  MG per tablet Take 1 tablet by mouth Every 6 (Six) Hours As Needed for Moderate Pain.   • ibuprofen (ADVIL,MOTRIN) 400 MG tablet Take 400 mg by mouth Every Night.   • lidocaine (Lidoderm) 5 % Place 1 patch on the skin as directed by provider Daily. Remove & Discard patch within 12 hours or as directed by MD   • lisinopril-hydrochlorothiazide (PRINZIDE,ZESTORETIC) 20-12.5 MG  per tablet Take 1 tablet by mouth Daily.   • Multiple Vitamins-Minerals (MULTIVITAMIN ADULT PO) Take  by mouth Daily.   • vitamin C (ASCORBIC ACID) 500 MG tablet Take 500 mg by mouth Daily.     No current facility-administered medications on file prior to visit.     Current Medications:  Scheduled Meds:  Continuous Infusions:No current facility-administered medications for this visit.    PRN Meds:.    Past Medical History:   Diagnosis Date   • Diabetes mellitus (HCC)    • Hypertension    • Renal insufficiency       No past surgical history on file.     Social History     Occupational History   • Not on file   Tobacco Use   • Smoking status: Former     Packs/day: 1.00     Years: 23.00     Pack years: 23.00     Types: Cigarettes     Start date:      Quit date:      Years since quittin.1   • Smokeless tobacco: Never   Vaping Use   • Vaping Use: Never used   Substance and Sexual Activity   • Alcohol use: Yes     Comment: occassional    • Drug use: Never   • Sexual activity: Defer      Social History     Social History Narrative   • Not on file        Family History   Problem Relation Age of Onset   • Stroke Mother    • Heart attack Father    • Stroke Brother    • Diverticulitis Brother    • Hypertension Brother              Review of Systems:     Review of Systems      Physical Exam: 82 y.o. male  General Appearance:    Alert, cooperative, in no acute distress                 There were no vitals filed for this visit.   Patient is alert and read ×3 no acute distress appears her above-listed at height weight and age.  Affect is normal respiratory rate is normal unlabored. Heart rate regular rate rhythm, sclera, dentition and hearing are normal for the purpose of this exam.    Ortho Exam  Physical exam of the left shoulder reveals no overlying skin changes no lymphedema no lymphadenopathy.  Patient has active flexion 170 with mild symptoms abduction is similar external rotation is to 40 and internal rotation  to the lower lumbar spine with mild symptoms.  Patient has good rotator cuff strength 4+ over 5 with isometric strength testing with pain.  Patient has a positive impingement and a positive Gibbs sign.  Patient has good cervical range of motion which is full and asymptomatic no radicular symptoms.  Patient has a normal elbow exam.  Good distal pulses are presentPatient has pain with overhead activity and a positive Neer sign and a positive empty can sign  They have a positive drop arm any definitive painful arc    Large Joint Arthrocentesis: L subacromial bursa  Date/Time: 1/30/2023 2:44 PM  Consent given by: patient  Site marked: site marked  Timeout: Immediately prior to procedure a time out was called to verify the correct patient, procedure, equipment, support staff and site/side marked as required   Supporting Documentation  Indications: pain   Procedure Details  Location: shoulder - L subacromial bursa  Preparation: Patient was prepped and draped in the usual sterile fashion  Needle gauge: 21G.  Approach: posterior  Medications administered: 80 mg methylPREDNISolone acetate 80 MG/ML; 2 mL lidocaine PF 1% 1 %  Patient tolerance: patient tolerated the procedure well with no immediate complications                Radiology:   AP, Scapular Y and Axillary Lateral of the left shoulder were /reviewed to evauate shoulder pain.  He has some acromioclavicular arthritis also has some narrowing of his glenohumeral joint as seen by inferior osteophytes  Imaging Results (Most Recent)     None        Assessment/Plan: Left shoulder pain he does have some arthritis but I think more of his symptoms are rotator cuff today plan is to proceed with an injection as a diagnostic and therapeutic tool I will then have him come back in about a month if he is not better would consider a glenohumeral injection to address the arthritis he has an MRI scheduled for Wednesday I think it is reasonable to go on to do that he understands his  risk of the injection is higher with his diabetes he knows to watch his blood sugars

## 2023-01-31 ENCOUNTER — PATIENT ROUNDING (BHMG ONLY) (OUTPATIENT)
Dept: ORTHOPEDIC SURGERY | Facility: CLINIC | Age: 83
End: 2023-01-31
Payer: MEDICARE

## 2023-01-31 NOTE — PROGRESS NOTES
A 10X10 Room Message has been sent to the patient for PATIENT ROUNDING with [facility]

## 2023-02-01 ENCOUNTER — HOSPITAL ENCOUNTER (OUTPATIENT)
Dept: MRI IMAGING | Facility: HOSPITAL | Age: 83
Discharge: HOME | End: 2023-02-01
Admitting: NURSE PRACTITIONER
Payer: MEDICARE

## 2023-02-01 DIAGNOSIS — M25.512 PAIN OF LEFT SHOULDER JOINT ON MOVEMENT: ICD-10-CM

## 2023-02-01 PROCEDURE — 73221 MRI JOINT UPR EXTREM W/O DYE: CPT

## 2023-02-07 RX ORDER — CYCLOBENZAPRINE HCL 10 MG
TABLET ORAL
Qty: 90 TABLET | Refills: 0 | Status: SHIPPED | OUTPATIENT
Start: 2023-02-07

## 2023-02-28 ENCOUNTER — OFFICE VISIT (OUTPATIENT)
Dept: ORTHOPEDIC SURGERY | Facility: CLINIC | Age: 83
End: 2023-02-28
Payer: MEDICARE

## 2023-02-28 VITALS — TEMPERATURE: 97.1 F | HEIGHT: 70 IN | BODY MASS INDEX: 26.48 KG/M2 | WEIGHT: 185 LBS

## 2023-02-28 DIAGNOSIS — M75.122 COMPLETE TEAR OF LEFT ROTATOR CUFF, UNSPECIFIED WHETHER TRAUMATIC: Primary | ICD-10-CM

## 2023-02-28 PROCEDURE — 99213 OFFICE O/P EST LOW 20 MIN: CPT | Performed by: ORTHOPAEDIC SURGERY

## 2023-02-28 RX ORDER — CHLORAL HYDRATE 500 MG
CAPSULE ORAL
COMMUNITY

## 2023-04-05 ENCOUNTER — APPOINTMENT (OUTPATIENT)
Dept: GENERAL RADIOLOGY | Facility: HOSPITAL | Age: 83
End: 2023-04-05
Payer: MEDICARE

## 2023-04-05 ENCOUNTER — HOSPITAL ENCOUNTER (EMERGENCY)
Facility: HOSPITAL | Age: 83
Discharge: HOME | End: 2023-04-05
Attending: EMERGENCY MEDICINE | Admitting: EMERGENCY MEDICINE
Payer: MEDICARE

## 2023-04-05 VITALS
HEART RATE: 100 BPM | RESPIRATION RATE: 16 BRPM | OXYGEN SATURATION: 95 % | TEMPERATURE: 97.6 F | WEIGHT: 185 LBS | DIASTOLIC BLOOD PRESSURE: 64 MMHG | HEIGHT: 69 IN | SYSTOLIC BLOOD PRESSURE: 102 MMHG | BODY MASS INDEX: 27.4 KG/M2

## 2023-04-05 DIAGNOSIS — I10 HYPERTENSION, UNSPECIFIED TYPE: ICD-10-CM
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
## 2023-04-05 DIAGNOSIS — S51.012A LACERATION OF LEFT ELBOW, INITIAL ENCOUNTER: ICD-10-CM

## 2023-04-05 DIAGNOSIS — E78.5 HYPERLIPIDEMIA, UNSPECIFIED HYPERLIPIDEMIA TYPE: ICD-10-CM

## 2023-04-05 DIAGNOSIS — M25.512 LEFT SHOULDER PAIN, UNSPECIFIED CHRONICITY: ICD-10-CM

## 2023-04-05 DIAGNOSIS — Z86.73 HISTORY OF STROKE: ICD-10-CM
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
## 2023-04-05 PROCEDURE — 99283 EMERGENCY DEPT VISIT LOW MDM: CPT

## 2023-04-05 PROCEDURE — 73030 X-RAY EXAM OF SHOULDER: CPT

## 2023-04-06 NOTE — ED PROVIDER NOTES
Pt presents to the ED c/o  a fall earlier today when his feet got tripped up and he fell onto his left arm with a couple of skin tears over the elbow.  Denies any pain in the elbow and has full range of motion of it.  Complaining of acute on chronic left shoulder pain, known chronic rotator cuff issue but family was concerned he could have may be broken something with the fall due to increased pain.  No head injury or LOC, not on any anticoagulation.  Has describes some increased weakness recently typically worse when he tries to stand up after sitting for long period of time.  Has had prior strokes with weakness post strokes.  No focal deficits at this time.     On exam,   General: No acute distress, nontoxic, no scalp hematoma  HEENT: EOMI  Pulm: Symmetric chest rise, nonlabored breathing  CV: Regular rate and rhythm  GI: Nondistended  MSK: Full range of motion of the left elbow with superficial skin tears noted, decreased range of motion of the left shoulder due to pain but no obvious deformity to palpation  Skin: Warm, dry  Neuro: Awake, alert, oriented x 4, 5 out of 5 strength sensation bilateral lower extremities, moving all extremities, no focal deficits  Psych: Calm, cooperative    Vital signs and nursing notes reviewed.         N95, protective eye goggles, and gloves used during this encounter. Patient in surgical mask.      Plan:   ED Course as of 04/07/23 1027   Wed Apr 05, 2023 2237 X-ray images independently viewed by me, the patient has degenerative changes but no fracture or dislocation. [JIM]      ED Course User Index  [JIM] Vaibhav Parham PA       Plan for left shoulder x-ray to rule out acute fracture but ultimately suspect probably more likely acute on chronic rotator cuff issue.  I have no concerns for any acute emergent process otherwise.  Will can discharge home once x-rays are complete.  Family is comfortable with this plan with outpatient follow-up.       Attestation:  The JUNIOR and I have  discussed this patient's history, physical exam, and treatment plan.  I have reviewed the documentation and personally had a face to face interaction with the patient. I affirm the documentation and agree with the treatment and plan.  The attached note describes my personal findings.            Rico Keating MD  04/05/23 [id number]       Rico Keating MD  04/07/23 0999

## 2023-04-06 NOTE — DISCHARGE INSTRUCTIONS
Home, rest, keep wound clean and dry and watch carefully for any signs of infection.  Tylenol as needed for pain, other home medicine as prescribed, follow up with PCP for recheck and further evaluation. Return to care with further concerns.

## 2023-04-06 NOTE — ED TRIAGE NOTES
Pt was brought in by ems from home for fall. Pt was walking back from bathroom when his feet got tangled and he fell. Pt has skin tear to left elbow. Denies hitting head or blood thinners    This RN wore mask and goggles during time of contact

## 2023-04-06 NOTE — ED PROVIDER NOTES
EMERGENCY DEPARTMENT ENCOUNTER    Room Number:  04/04  Date of encounter:  4/5/2023  PCP: Yo Mckeon APRN  Patient Care Team:  Yo Mckeon APRN as PCP - General (Internal Medicine)  Luz Marina Solano MD as Consulting Physician (Nephrology)   Independent Historians: Patient    HPI:  Chief Complaint: Fall  A complete HPI/ROS/PMH/PSH/SH/FH are unobtainable due to: N/A    Chronic or social conditions impacting patient care (social determinants of health): None    Context: Benja Macedo is a 82 y.o. male with past medical history of HTN, HLD, T2DM, CKD, and history of prior stroke who arrives to the ED with complaint of fall.  Patient states that he was going to the bathroom when he got his feet tangled, tripped, and fell onto his left arm.  Patient has history of rotator cuff injury to the left arm and has decreased mobility with that and landed on his left elbow causing a laceration.  Patient denies any head injury, headache, loss of consciousness, neck pain, chest pain, shortness of breath, dizziness, nausea or vomiting.  Patient's last tetanus vaccination was in July 2019.    Review of prior external notes (non-ED): Recent visit with PCP and orthopedic for left rotator cuff tear    Review of prior external test results outside of this encounter: MRI of the left shoulder shows a complete tear of the supraspinatus and infraspinatus and moderate AC and glenohumeral joint arthritis.    PAST MEDICAL HISTORY  Active Ambulatory Problems     Diagnosis Date Noted   • Benign essential hypertension 03/20/2016   • Gastroesophageal reflux disease 03/20/2016   • Hyperglycemia 03/20/2016   • Hyperlipidemia 03/20/2016   • Nocturia 03/20/2016   • Renal insufficiency 03/20/2016   • Cerebrovascular accident 03/20/2016   • Type 2 diabetes mellitus 03/02/2021   • Benign hypertension with chronic kidney disease 11/23/2021   • Stage 3b chronic kidney disease 11/23/2021   • Edema of lower extremity 12/05/2021   • Dyslipidemia  2016   • History of cerebrovascular accident 2016   • Pain of left shoulder joint on movement 2023     Resolved Ambulatory Problems     Diagnosis Date Noted   • Urinary tract infection 2016   • Type 2 diabetes mellitus with kidney complication, without long-term current use of insulin 2016     Past Medical History:   Diagnosis Date   • Diabetes mellitus    • Hypertension        The patient has started, but not completed, their COVID-19 vaccination series.    PAST SURGICAL HISTORY  No past surgical history on file.      FAMILY HISTORY  Family History   Problem Relation Age of Onset   • Stroke Mother    • Heart attack Father    • Stroke Brother    • Diverticulitis Brother    • Hypertension Brother          SOCIAL HISTORY  Social History     Socioeconomic History   • Marital status: Single   Tobacco Use   • Smoking status: Former     Packs/day: 1.00     Years: 23.00     Pack years: 23.00     Types: Cigarettes     Start date:      Quit date:      Years since quittin.2   • Smokeless tobacco: Never   Vaping Use   • Vaping Use: Never used   Substance and Sexual Activity   • Alcohol use: Yes     Comment: occassional    • Drug use: Never   • Sexual activity: Defer         ALLERGIES  Patient has no known allergies.        REVIEW OF SYSTEMS  Review of Systems     All systems reviewed and negative except for those discussed in HPI.       PHYSICAL EXAM    I have reviewed the triage vital signs and nursing notes.    ED Triage Vitals [23 2134]   Temp Heart Rate Resp BP SpO2   97.6 °F (36.4 °C) 100 16 102/64 95 %      Temp src Heart Rate Source Patient Position BP Location FiO2 (%)   Tympanic Monitor -- -- --       Physical Exam    GENERAL: alert and oriented x4, not distressed  HENT: normocephalic, atraumatic, no hemotympanum, no C-spine tenderness, moist mucous membranes  EYES: no scleral icterus, PERRL, EOMI  CV: regular rhythm, regular rate, intact distal pulses  RESPIRATORY:  normal effort, CTAB  ABDOMEN: soft/nontender  MUSCULOSKELETAL: no deformity, decreased ROM of the left shoulder secondary to pain, no bony tenderness  NEURO: alert, moves all extremities, no focal neuro deficits, follows commands  SKIN: warm, dry, no rash, 1.5 cm skin tear to the lateral aspect of the left elbow  Psych: Appropriate mood and affect      Nursing notes and vital signs reviewed      LAB RESULTS  No results found for this or any previous visit (from the past 24 hour(s)).    Ordered the above labs and independently reviewed and interpreted the results by me.        RADIOLOGY  XR Shoulder 2+ View Left    Result Date: 4/5/2023  2 VIEWS LEFT SHOULDER  HISTORY: Pain after a fall  COMPARISON: None available.  FINDINGS: No acute fracture or subluxation of the left shoulder seen. Degenerative changes involving the left AC and glenohumeral joints are again noted.      No acute fracture or subluxation identified.  This report was finalized on 4/5/2023 10:54 PM by Dr. Ava Tan M.D.        I ordered the above noted radiological studies.  These were independently interpreted and reviewed by me.  See dictation for official radiology interpretation.      PROCEDURES    Procedures      MEDICATIONS GIVEN IN ER    Medications - No data to display      PROGRESS, DATA ANALYSIS, CONSULTS, AND MEDICAL DECISION MAKING    All labs have been independently reviewed by me.  All radiology studies have been reviewed by me and discussed with radiologist dictating the report.   EKG's independently viewed and interpreted by me.  Discussion below represents my analysis of pertinent findings related to patient's condition, differential diagnosis, treatment plan and final disposition.    DDx:  Includes, but is not limited to fall, contusion, laceration      ED Course as of 04/05/23 2327   Wed Apr 05, 2023   2237 X-ray images independently viewed by me, the patient has degenerative changes but no fracture or dislocation. [JIM]       ED Course User Index  [JIM] Vaibhav Parham PA       MDM: Patient's x-rays show no evidence for acute fracture or dislocation, he is ambulating at baseline, there is no evidence for head injury and patient denies any headache or other complaints.  Patient family feel comfortable with taking patient home who states that he does have a walker and wheelchair and other assistive devices that he needs at home where he has good support.  Vital signs are stable, patient is safe for discharge.    PPE:  The patient wore a mask and I wore a mask and all appropriate PPE throughout the entire patient encounter.      AS OF 23:27 EDT VITALS:    BP - 102/64  HR - 100  TEMP - 97.6 °F (36.4 °C) (Tympanic)  O2 SATS - 95%      DIAGNOSIS  Final diagnoses:   Fall in home, initial encounter   Left shoulder pain, unspecified chronicity   Laceration of left elbow, initial encounter   History of stroke   Hypertension, unspecified type   Hyperlipidemia, unspecified hyperlipidemia type         DISPOSITION  DISCHARGE    Patient discharged in stable condition.    Reviewed implications of results, diagnosis, meds, responsibility to follow up, warning signs and symptoms of possible worsening, potential complications and reasons to return to ER.    Patient/Family voiced understanding of above instructions.    Discussed plan for discharge, as there is no emergent indication for admission. Patient referred to primary care provider for BP management due to today's BP. Pt/family is agreeable and understands need for follow up and repeat testing.  Pt is aware that discharge does not mean that nothing is wrong but it indicates no emergency is present that requires admission and they must continue care with follow-up as given below or physician of their choice.     FOLLOW-UP  Yo Mckeon, APRN  [address] [id number] [phone]    Schedule an appointment as soon as possible for a visit            Medication List      No  changes were made to your prescriptions during this visit.           Note Disclaimer: At [facility], we believe that sharing information builds trust and better relationships. You are receiving this note because you recently visited [facility]. It is possible you will see health information before a provider has talked with you about it. This kind of information can be easy to misunderstand. To help you fully understand what it means for your health, we urge you to discuss this note with your provider.     Vaibhav Parham PA  04/05/23 0170

## 2023-04-10 ENCOUNTER — TELEPHONE (OUTPATIENT)
Dept: INTERNAL MEDICINE | Age: 83
End: 2023-04-10
Payer: MEDICARE

## 2023-04-10 NOTE — TELEPHONE ENCOUNTER
Patient discharged from ER 04/05/2023. Earliest available with Yo is 04/13 at 11am. Patient's sister in law called on his behalf and is on verbal. Sending message to see if okay to wait or if need to have patient seen by another provider sooner. Sister in law states patient has some facial swelling (no comment on severity or other concerns). Advised sister in law in meantime if condition worsens to go to ER or urgent care (whichever is most appropriate based on symptoms). Will call back if need to work in or see another provider sooner.

## 2023-04-11 NOTE — TELEPHONE ENCOUNTER
Sister in Law states pt is much better this am. No active c/o. Swelling is no longer present in face.   OV offered, but both pt and sister in law declined. Pt will keep lupillo visit.

## 2023-04-13 ENCOUNTER — HOME HEALTH ADMISSION (OUTPATIENT)
Dept: HOME HEALTH SERVICES | Facility: HOME HEALTHCARE | Age: 83
End: 2023-04-13
Payer: MEDICARE

## 2023-04-13 ENCOUNTER — OFFICE VISIT (OUTPATIENT)
Dept: INTERNAL MEDICINE | Age: 83
End: 2023-04-13
Payer: MEDICARE

## 2023-04-13 VITALS
OXYGEN SATURATION: 98 % | HEART RATE: 89 BPM | SYSTOLIC BLOOD PRESSURE: 104 MMHG | DIASTOLIC BLOOD PRESSURE: 60 MMHG | BODY MASS INDEX: 27.32 KG/M2 | HEIGHT: 69 IN | TEMPERATURE: 97.1 F

## 2023-04-13 DIAGNOSIS — Z86.73 HISTORY OF CEREBROVASCULAR ACCIDENT: ICD-10-CM

## 2023-04-13 DIAGNOSIS — R29.6 RECURRENT FALLS: ICD-10-CM

## 2023-04-13 DIAGNOSIS — R26.81 GAIT INSTABILITY: ICD-10-CM

## 2023-04-13 DIAGNOSIS — R29.898 WEAKNESS OF BOTH LOWER EXTREMITIES: Primary | ICD-10-CM

## 2023-04-13 NOTE — PROGRESS NOTES
"[facility] [place] INTERNAL MEDICINE  EVELIA Rosario    Benja Macedo / 82 y.o. / male  04/13/2023      CC:   Hospital Follow Up Visit (FALL, HTN)      VITALS    Visit Vitals  /60   Pulse 89   Temp 97.1 °F (36.2 °C) (Temporal)   Ht 175.3 cm (69\")   SpO2 98%   BMI 27.32 kg/m²       BP Readings from Last 3 Encounters:   04/13/23 104/60   04/05/23 102/64   01/10/23 122/68     Wt Readings from Last 3 Encounters:   04/05/23 83.9 kg (185 lb)   02/28/23 83.9 kg (185 lb)   01/30/23 83.9 kg (185 lb)      Body mass index is 27.32 kg/m².    HPI:     Date of Emergency Room Visit: 04/05/2023  Hospital: [facility]  Principle Dx: Fall  Secondary Dx: Left shoulder pain, laceration of left elbow  History prior to hospitalization: Patient was originally going to the bathroom when he got his feet tangled tripped and fell on his left arm, history of rotator cuff injury to the left arm and has decreased mobility on that side and landed on his left elbow causing laceration.  He had no head injury at the time, headache, loss of consciousness, neck pain, chest pain, shortness of breath, dizziness, nausea or vomiting.  Last tetanus July 2019.  Evaluation/Treatment: MRI of the left shoulder was reviewed from outside encounter, pleat tear of the supraspinatus and infraspinatus and moderate before meals and glenohumeral joint arthritis.  1.5 cm skin tear to the lateral aspect of the left elbow.  X-ray of the shoulder revealed no acute fracture or subluxation  Course: Patient today has limited left facial swelling, healing skin tear to the left arm, states that he has no lumbar pain numbness or tingling or knee pain that would contribute to his falls.  He has trouble with transfers and mobility with some right-sided residual from CVA in the past.    Patient Care Team:  Yo Mckeon APRN as PCP - General (Internal Medicine)  Luz Marina Solano MD as Consulting Physician " (Nephrology)  ____________________________________________________________________    ASSESSMENT & PLAN:    1. Weakness of both lower extremities    2. Gait instability    3. Recurrent falls    4. History of cerebrovascular accident      Orders Placed This Encounter   Procedures   • Ambulatory Referral to Home Health       Summary/Discussion:  · Ultimately he would benefit from physical therapy in the home as they can help with transfers within his own home setting, especially gait transfers in the bathroom.  We will place referral today.  · He will follow with orthopedics as scheduled for left rotator cuff    No follow-ups on file.    ____________________________________________________________________    REVIEW OF SYSTEMS    Review of Systems  Constitutional neg except per HPI   Resp neg  CV neg    PHYSICAL EXAMINATION    Physical Exam   Constitutional  No distress  Cardiovascular Rate  normal . Rhythm: regular . Heart sounds:  normal  Chronic lower extremity edema right greater than left  Pulmonary/Chest  Effort normal. Breath sounds:  normal  Psychiatric  Alert. Judgment and thought content normal. Mood normal   Skin skin tear left upper extremity     REVIEWED DATA:    Labs:   No visits with results within 14 Day(s) from this visit.   Latest known visit with results is:   Orders Only on 01/03/2023   Component Date Value Ref Range Status   • Specific Gravity, UA 01/03/2023 1.020  1.005 - 1.030 Final   • pH, UA 01/03/2023 5.5  5.0 - 8.0 Final   • Color, UA 01/03/2023 Yellow   Final    Comment: Urine microscopic not indicated.  REFERENCE RANGE: Yellow, Straw     • Appearance, UA 01/03/2023 Clear  Clear Final   • Leukocytes, UA 01/03/2023 Negative  Negative Final   • Protein 01/03/2023 Negative  Negative Final   • Glucose, UA 01/03/2023 Negative  Negative Final   • Ketones 01/03/2023 Negative  Negative Final   • Blood, UA 01/03/2023 Negative  Negative Final   • Bilirubin, UA 01/03/2023 Negative  Negative Final   •  Urobilinogen, UA 01/03/2023 Comment   Final    Comment: 0.2 E.U./dL  REFERENCE RANGE: 0.2 - 1.0 E.U./dL     • Nitrite, UA 01/03/2023 Negative  Negative Final   • Microalbumin, Urine 01/03/2023 <3.0  Not Estab. ug/mL Final    **Verified by repeat analysis**       Imaging:   XR Shoulder 2+ View Left    Result Date: 4/5/2023  Narrative: 2 VIEWS LEFT SHOULDER  HISTORY: Pain after a fall  COMPARISON: None available.  FINDINGS: No acute fracture or subluxation of the left shoulder seen. Degenerative changes involving the left AC and glenohumeral joints are again noted.      Impression: No acute fracture or subluxation identified.  This report was finalized on 4/5/2023 10:54 PM by Dr. Ava Tan M.D.         Medical Tests:        Summary of old records / correspondence / consultant report:   DC summary re: issues addressed on HPI    Request outside records:       ALLERGIES  No Known Allergies     MEDICATIONS   Current Outpatient Medications   Medication Sig Dispense Refill   • acetaminophen (TYLENOL) 500 MG tablet Take 2 tablets by mouth At Night As Needed.     • ASPIRIN 81 PO Take 81 mg by mouth Daily.     • atorvastatin (LIPITOR) 20 MG tablet TAKE ONE TABLET BY MOUTH DAILY 30 tablet 11   • carvedilol (COREG) 6.25 MG tablet TAKE ONE TABLET BY MOUTH TWICE A DAY WITH MEALS 180 tablet 3   • cetirizine (zyrTEC) 10 MG tablet Take 1 tablet by mouth Daily.     • coenzyme Q10 100 MG capsule Take 1 capsule by mouth Daily.     • cyclobenzaprine (FLEXERIL) 10 MG tablet TAKE ONE TABLET BY MOUTH THREE TIMES A DAY AS NEEDED FOR MUSCLE SPASMS 90 tablet 0   • diphenhydrAMINE (BENADRYL) 25 MG tablet Take 1 tablet by mouth At Night As Needed for Allergies.     • fluticasone (FLONASE) 50 MCG/ACT nasal spray 2 sprays into the nostril(s) as directed by provider Daily As Needed.     • lisinopril-hydrochlorothiazide (PRINZIDE,ZESTORETIC) 20-12.5 MG per tablet Take 1 tablet by mouth Daily.     • Multiple Vitamins-Minerals (MULTIVITAMIN  ADULT PO) Take  by mouth Daily.     • Omega-3 Fatty Acids (fish oil) 1000 MG capsule capsule Take  by mouth Daily With Breakfast.     • vitamin C (ASCORBIC ACID) 500 MG tablet Take 1 tablet by mouth Daily.       No current facility-administered medications for this visit.       Current outpatient and discharge medications have been reconciled for the patient.  Reviewed by: EVELIA Rosario       [facility]:     The following portions of the patient's history were reviewed and updated as appropriate: Allergies / Current Medications / Past Medical History / Surgical History / Social History / Family History    PROBLEM LIST   Patient Active Problem List   Diagnosis   • Benign essential hypertension   • Gastroesophageal reflux disease   • Hyperglycemia   • Hyperlipidemia   • Nocturia   • Renal insufficiency   • Cerebrovascular accident   • Type 2 diabetes mellitus   • Benign hypertension with chronic kidney disease   • Stage 3b chronic kidney disease   • Edema of lower extremity   • Dyslipidemia   • History of cerebrovascular accident   • Pain of left shoulder joint on movement       PAST MEDICAL HISTORY  Past Medical History:   Diagnosis Date   • Diabetes mellitus    • Hypertension    • Renal insufficiency        SURGICAL HISTORY  No past surgical history on file.    SOCIAL HISTORY  Social History     Socioeconomic History   • Marital status: Single   Tobacco Use   • Smoking status: Former     Packs/day: 1.00     Years: 23.00     Pack years: 23.00     Types: Cigarettes     Start date:      Quit date:      Years since quittin.3   • Smokeless tobacco: Never   Vaping Use   • Vaping Use: Never used   Substance and Sexual Activity   • Alcohol use: Yes     Comment: occassional    • Drug use: Never   • Sexual activity: Defer       FAMILY HISTORY  Family History   Problem Relation Age of Onset   • Stroke Mother    • Heart attack Father    • Stroke Brother    • Diverticulitis Brother    • Hypertension Brother         **Dragon dictation used for documentation.

## 2023-04-19 NOTE — PROGRESS NOTES
Patient: Benja Macedo  YOB: 1940  Date of Service: 4/19/2023    Chief Complaints:   Left shoulder pain  Subjective:    History of Present Illness: Pt is seen in the office today with complaints of left shoulder pain he has known large rotator cuff tear and some degenerative changes we have decided we will manage those nonoperatively.  He did have a fall in the bathroom recently with increased pain in his shoulder and decrease in function he was seen in the ER x-rays were normal        Allergies: No Known Allergies    Medications:   Home Medications:  Current Outpatient Medications on File Prior to Visit   Medication Sig   • acetaminophen (TYLENOL) 500 MG tablet Take 2 tablets by mouth At Night As Needed.   • ASPIRIN 81 PO Take 81 mg by mouth Daily.   • atorvastatin (LIPITOR) 20 MG tablet TAKE ONE TABLET BY MOUTH DAILY   • carvedilol (COREG) 6.25 MG tablet TAKE ONE TABLET BY MOUTH TWICE A DAY WITH MEALS   • cetirizine (zyrTEC) 10 MG tablet Take 1 tablet by mouth Daily.   • coenzyme Q10 100 MG capsule Take 1 capsule by mouth Daily.   • cyclobenzaprine (FLEXERIL) 10 MG tablet TAKE ONE TABLET BY MOUTH THREE TIMES A DAY AS NEEDED FOR MUSCLE SPASMS   • diphenhydrAMINE (BENADRYL) 25 MG tablet Take 1 tablet by mouth At Night As Needed for Allergies.   • fluticasone (FLONASE) 50 MCG/ACT nasal spray 2 sprays into the nostril(s) as directed by provider Daily As Needed.   • lisinopril-hydrochlorothiazide (PRINZIDE,ZESTORETIC) 20-12.5 MG per tablet Take 1 tablet by mouth Daily.   • Multiple Vitamins-Minerals (MULTIVITAMIN ADULT PO) Take  by mouth Daily.   • Omega-3 Fatty Acids (fish oil) 1000 MG capsule capsule Take  by mouth Daily With Breakfast.   • vitamin C (ASCORBIC ACID) 500 MG tablet Take 1 tablet by mouth Daily.     No current facility-administered medications on file prior to visit.     Current Medications:  Scheduled Meds:  Continuous Infusions:No current facility-administered medications for  this visit.    PRN Meds:.    I have reviewed the patient's medical history in detail and updated the computerized patient record.  Review and summarization of old records include:    Past Medical History:   Diagnosis Date   • Diabetes mellitus    • Hypertension    • Renal insufficiency       No past surgical history on file.     Social History     Occupational History   • Not on file   Tobacco Use   • Smoking status: Former     Packs/day: 1.00     Years: 23.00     Pack years: 23.00     Types: Cigarettes     Start date:      Quit date:      Years since quittin.3   • Smokeless tobacco: Never   Vaping Use   • Vaping Use: Never used   Substance and Sexual Activity   • Alcohol use: Yes     Comment: occassional    • Drug use: Never   • Sexual activity: Defer      Social History     Social History Narrative   • Not on file        Family History   Problem Relation Age of Onset   • Stroke Mother    • Heart attack Father    • Stroke Brother    • Diverticulitis Brother    • Hypertension Brother        ROS: 14 point review of systems was performed and was negative except for documented findings in HPI and today's encounter.     Allergies: No Known Allergies  Constitutional:  Denies fever, shaking or chills   Eyes:  Denies change in visual acuity   HENT:  Denies nasal congestion or sore throat   Respiratory:  Denies cough or shortness of breath   Cardiovascular:  Denies chest pain or severe LE edema   GI:  Denies abdominal pain, nausea, vomiting, bloody stools or diarrhea   Musculoskeletal:  Numbness, tingling, or loss of motor function only as noted above in history of present illness.  : Denies painful urination or hematuria  Integument:  Denies rash, lesion or ulceration   Neurologic:  Denies headache or focal weakness  Endocrine:  Denies lymphadenopathy  Psych:  Denies confusion or change in mental status   Hem:  Denies active bleeding      Physical Exam: 82 y.o. male  Wt Readings from Last 3 Encounters:    04/05/23 83.9 kg (185 lb)   02/28/23 83.9 kg (185 lb)   01/30/23 83.9 kg (185 lb)       There is no height or weight on file to calculate BMI.    There were no vitals filed for this visit.  Vital signs reviewed.   General Appearance:    Alert, cooperative, in no acute distress                    Ortho exam  His exam is deferred he can only forward flex to about 100 rotator cuff strength is 4/5 pretty similar he does seem to have more pain    X-rays AP scapular Y and internally rotated view were taken to evaluate his shoulder I did compare to x-rays done in the ER initially on those ER films I thought he might have a nondisplaced tuberosity fracture although I do not think so based on these x-rays I just took him previous x-rays for comparison               Assessment: Left shoulder pain with rotator cuff tear    Plan: It is a little early for another injection but he is pretty miserable I will push this envelope wants and inject him today  Follow up as indicated.  Ice, elevate, and rest as needed.  Discussed conservative measures of pain control including ice, bracing.  Also talked about the importance of strengthening and maintaining ideal body weight    Yenni Mendez M.D.    Large Joint Arthrocentesis: R subacromial bursa  Date/Time: 4/24/2023 4:47 PM  Consent given by: patient  Site marked: site marked  Timeout: Immediately prior to procedure a time out was called to verify the correct patient, procedure, equipment, support staff and site/side marked as required   Supporting Documentation  Indications: pain   Procedure Details  Location: shoulder - R subacromial bursa  Preparation: Patient was prepped and draped in the usual sterile fashion  Needle gauge: 21G.  Approach: posterior  Medications administered: 80 mg methylPREDNISolone acetate 80 MG/ML; 2 mL lidocaine PF 1% 1 %  Patient tolerance: patient tolerated the procedure well with no immediate complications

## 2023-04-24 ENCOUNTER — OFFICE VISIT (OUTPATIENT)
Dept: ORTHOPEDIC SURGERY | Facility: CLINIC | Age: 83
End: 2023-04-24
Payer: MEDICARE

## 2023-04-24 VITALS — HEIGHT: 69 IN | BODY MASS INDEX: 25.92 KG/M2 | WEIGHT: 175 LBS

## 2023-04-24 DIAGNOSIS — M75.122 COMPLETE TEAR OF LEFT ROTATOR CUFF, UNSPECIFIED WHETHER TRAUMATIC: ICD-10-CM

## 2023-04-24 DIAGNOSIS — R52 PAIN: Primary | ICD-10-CM

## 2023-04-24 RX ORDER — LIDOCAINE HYDROCHLORIDE 10 MG/ML
2 INJECTION, SOLUTION EPIDURAL; INFILTRATION; INTRACAUDAL; PERINEURAL
Status: COMPLETED | OUTPATIENT
Start: 2023-04-24 | End: 2023-04-24

## 2023-04-24 RX ORDER — METHYLPREDNISOLONE ACETATE 80 MG/ML
80 INJECTION, SUSPENSION INTRA-ARTICULAR; INTRALESIONAL; INTRAMUSCULAR; SOFT TISSUE
Status: COMPLETED | OUTPATIENT
Start: 2023-04-24 | End: 2023-04-24

## 2023-04-24 RX ADMIN — METHYLPREDNISOLONE ACETATE 80 MG: 80 INJECTION, SUSPENSION INTRA-ARTICULAR; INTRALESIONAL; INTRAMUSCULAR; SOFT TISSUE at 16:47

## 2023-04-24 RX ADMIN — LIDOCAINE HYDROCHLORIDE 2 ML: 10 INJECTION, SOLUTION EPIDURAL; INFILTRATION; INTRACAUDAL; PERINEURAL at 16:47

## 2023-05-18 RX ORDER — CYCLOBENZAPRINE HCL 10 MG
10 TABLET ORAL 3 TIMES DAILY PRN
Qty: 90 TABLET | Refills: 0 | Status: SHIPPED | OUTPATIENT
Start: 2023-05-18

## 2023-06-13 ENCOUNTER — OFFICE VISIT (OUTPATIENT)
Dept: INTERNAL MEDICINE | Age: 83
End: 2023-06-13
Payer: MEDICARE

## 2023-06-13 VITALS
OXYGEN SATURATION: 97 % | DIASTOLIC BLOOD PRESSURE: 56 MMHG | BODY MASS INDEX: 25.83 KG/M2 | HEIGHT: 69 IN | SYSTOLIC BLOOD PRESSURE: 100 MMHG | TEMPERATURE: 97.1 F | HEART RATE: 50 BPM

## 2023-06-13 DIAGNOSIS — R60.0 EDEMA OF LOWER EXTREMITY: ICD-10-CM

## 2023-06-13 DIAGNOSIS — N18.32 STAGE 3B CHRONIC KIDNEY DISEASE: ICD-10-CM

## 2023-06-13 DIAGNOSIS — E11.9 TYPE 2 DIABETES MELLITUS WITHOUT COMPLICATION, WITHOUT LONG-TERM CURRENT USE OF INSULIN: ICD-10-CM

## 2023-06-13 DIAGNOSIS — E78.5 DYSLIPIDEMIA: ICD-10-CM

## 2023-06-13 DIAGNOSIS — E78.2 MIXED HYPERLIPIDEMIA: ICD-10-CM

## 2023-06-13 DIAGNOSIS — I12.9 BENIGN HYPERTENSION WITH CHRONIC KIDNEY DISEASE: Primary | ICD-10-CM

## 2023-06-13 RX ORDER — LANOLIN ALCOHOL/MO/W.PET/CERES
1000 CREAM (GRAM) TOPICAL DAILY
COMMUNITY

## 2023-06-13 NOTE — PROGRESS NOTES
"    I N T E R N A L  M E D I C I N E  CESAR BROWN, APRN      ENCOUNTER DATE:  06/13/2023    Benja Macedo / 82 y.o. / male      CHIEF COMPLAINT / REASON FOR OFFICE VISIT     Hypertension, Hyperlipidemia, Diabetes, and Chronic Kidney Disease      ASSESSMENT & PLAN     Problem List Items Addressed This Visit        Cardiac and Vasculature    Hyperlipidemia    Relevant Medications    atorvastatin (LIPITOR) 20 MG tablet    Benign hypertension with chronic kidney disease - Primary    Relevant Medications    lisinopril-hydrochlorothiazide (PRINZIDE,ZESTORETIC) 20-12.5 MG per tablet    carvedilol (COREG) 6.25 MG tablet    Other Relevant Orders    Comprehensive Metabolic Panel    Dyslipidemia    Relevant Orders    Lipid Panel With / Chol / HDL Ratio       Endocrine and Metabolic    Type 2 diabetes mellitus    Relevant Orders    Hemoglobin A1c       Genitourinary and Reproductive     Stage 3b chronic kidney disease       Symptoms and Signs    Edema of lower extremity     Orders Placed This Encounter   Procedures   • Pneumococcal Conjugate Vaccine 20-Valent (PCV20)   • Comprehensive Metabolic Panel   • Lipid Panel With / Chol / HDL Ratio   • Hemoglobin A1c     No orders of the defined types were placed in this encounter.      SUMMARY/DISCUSSION  • Patient will continue following nephrology, avoiding NSAIDs.  We will check to see if blood sugar still controlled off medication, if LDL still slightly elevated her triglycerides may consider increasing atorvastatin in which patient is acceptable.  • Administer Prevnar 20 today.  Recommend Shingrix at local pharmacy.  Recommend diabetic eye exam    Next Appointment with me: Visit date not found    Return in about 6 months (around 12/13/2023) for Medicare Wellness may combine 2 15 or take physical slot .      VITAL SIGNS     Visit Vitals  /56   Pulse 50   Temp 97.1 °F (36.2 °C)   Ht 175.3 cm (69.02\")   SpO2 97%   BMI 25.83 kg/m²       Wt Readings from Last 3 Encounters: "   04/24/23 79.4 kg (175 lb)   04/05/23 83.9 kg (185 lb)   02/28/23 83.9 kg (185 lb)     Body mass index is 25.83 kg/m².      MEDICATIONS AT THE TIME OF OFFICE VISIT     Current Outpatient Medications on File Prior to Visit   Medication Sig   • acetaminophen (TYLENOL) 500 MG tablet Take 2 tablets by mouth At Night As Needed.   • ASPIRIN 81 PO Take 81 mg by mouth Daily.   • atorvastatin (LIPITOR) 20 MG tablet TAKE ONE TABLET BY MOUTH DAILY   • carvedilol (COREG) 6.25 MG tablet TAKE ONE TABLET BY MOUTH TWICE A DAY WITH MEALS   • cetirizine (zyrTEC) 10 MG tablet Take 1 tablet by mouth Daily.   • coenzyme Q10 100 MG capsule Take 1 capsule by mouth Daily.   • cyclobenzaprine (FLEXERIL) 10 MG tablet Take 1 tablet by mouth 3 (Three) Times a Day As Needed for Muscle Spasms.   • diphenhydrAMINE (BENADRYL) 25 MG tablet Take 1 tablet by mouth At Night As Needed for Allergies.   • fluticasone (FLONASE) 50 MCG/ACT nasal spray 2 sprays into the nostril(s) as directed by provider Daily As Needed.   • lisinopril-hydrochlorothiazide (PRINZIDE,ZESTORETIC) 20-12.5 MG per tablet Take 1 tablet by mouth Daily.   • Multiple Vitamins-Minerals (MULTIVITAMIN ADULT PO) Take  by mouth Daily.   • Omega-3 Fatty Acids (fish oil) 1000 MG capsule capsule Take  by mouth Daily With Breakfast.   • vitamin B-12 (CYANOCOBALAMIN) 1000 MCG tablet Take 1 tablet by mouth Daily.   • vitamin C (ASCORBIC ACID) 500 MG tablet Take 1 tablet by mouth Daily.     No current facility-administered medications on file prior to visit.          HISTORY OF PRESENT ILLNESS     Hyperlipidemia: Moderately controlled with atorvastatin 20 mg last  with triglycerides of 202.  No myalgias with medication.     Diabetes mellitus type II: Deviously on Metformin but last hemoglobin A1c 6.3. No longer on medication controlled with diet. No polyuria, polydipsia or peripheral neuropathy.  Last hemoglobin A1c 6.1.     Hypertension: Controlled on lisinopril hydrochlorothiazide  20-12.5 mg daily along carvedilol 6.25 mg twice daily.  No chest pain, shortness of breath, heart palpitations    Continued management by [facility] last seen January 2023 for chronic stage IIIb kidney disease, benign hypertension with chronic kidney disease, edema of lower extremity.  He has had recent acute kidney injury with creatinine that was up to 2.4 from baseline of 1.7, thought to be secondary to NSAID use, most recent creatinine 1.87 with no proteinuria, continued on lisinopril hydrochlorothiazide and was advised against any NSAIDs.  Blood pressure has been well controlled and is not contributing to worsening kidney function.  Edema lower extremities improving and nephrology continues to instruct him to limit salt intake.  Limit salt less than 2 g and they are following every 6 months.    Patient continues to be managed by orthopedics last seen by Dr. Mendez April 24, 2023 for complaints of left shoulder pain which he has a known large rotator cuff tear and some degenerative changes with management nonoperatively.  Recent fall in the bathroom with increased pain in the shoulder and decrease in function and x-rays within the ER were normal.  He was given a methylprednisolone injection at last office visit.    REVIEW OF SYSTEMS     Constitutional neg except per HPI   Resp neg  CV neg    PHYSICAL EXAMINATION     Physical Exam  Constitutional  No distress  Cardiovascular Rate  normal . Rhythm: regular . Heart sounds:  normal  Pulmonary/Chest  Effort normal. Breath sounds:  normal  Musc trace lower extremity edema   Psychiatric  Alert. Judgment and thought content normal. Mood normal     REVIEWED DATA     Labs:   Lab Results   Component Value Date    GLUCOSE 92 12/30/2022    BUN 33 (H) 12/30/2022    CREATININE 1.79 (H) 12/30/2022    EGFRRESULT 38 (L) 12/30/2022    EGFR 37.8 (L) 09/06/2022    BCR 18 12/30/2022    K 4.6 12/30/2022    CO2 21 12/30/2022    CALCIUM 10.2 12/30/2022    PROTENTOTREF 7.2  12/30/2022    ALBUMIN 4.6 12/30/2022    BILITOT 0.5 12/30/2022    AST 15 12/30/2022    ALT 18 12/30/2022     Lab Results   Component Value Date    TSH 1.160 12/30/2022     Lab Results   Component Value Date    HGBA1C 6.1 (H) 12/30/2022     Lab Results   Component Value Date    CHLPL 184 12/30/2022    TRIG 202 (H) 12/30/2022    HDL 39 (L) 12/30/2022     (H) 12/30/2022     Lab Results   Component Value Date    WBC 7.0 12/30/2022    HGB 13.1 12/30/2022    HCT 39.3 12/30/2022    MCV 97 12/30/2022     12/30/2022     Brief Urine Lab Results  (Last result in the past 365 days)      Color   Clarity   Blood   Leuk Est   Nitrite   Protein   CREAT   Urine HCG        01/03/23 1130 Yellow  Comment: Urine microscopic not indicated.  REFERENCE RANGE: Yellow, Straw     Clear   Negative   Negative   Negative   Negative               Lab Results   Component Value Date    PSA 0.8 05/17/2022    PSA 0.644 07/26/2019    PSA 0.611 09/21/2017     Imaging:           Medical Tests:           Summary of old records / correspondence / consultant report:           Request outside records:           **Dragon dictation used for documentation.

## 2023-06-14 DIAGNOSIS — E83.52 HYPERCALCEMIA: ICD-10-CM

## 2023-06-14 DIAGNOSIS — E87.5 HYPERKALEMIA: Primary | ICD-10-CM

## 2023-06-14 LAB
ALBUMIN SERPL-MCNC: 4.4 G/DL (ref 3.5–5.2)
ALBUMIN/GLOB SERPL: 1.7 G/DL
ALP SERPL-CCNC: 76 U/L (ref 39–117)
ALT SERPL-CCNC: 18 U/L (ref 1–41)
AST SERPL-CCNC: 14 U/L (ref 1–40)
BILIRUB SERPL-MCNC: 0.2 MG/DL (ref 0–1.2)
BUN SERPL-MCNC: 47 MG/DL (ref 8–23)
BUN/CREAT SERPL: 26.4 (ref 7–25)
CALCIUM SERPL-MCNC: 10.9 MG/DL (ref 8.6–10.5)
CHLORIDE SERPL-SCNC: 106 MMOL/L (ref 98–107)
CHOLEST SERPL-MCNC: 166 MG/DL (ref 0–200)
CHOLEST/HDLC SERPL: 4.61 {RATIO}
CO2 SERPL-SCNC: 25 MMOL/L (ref 22–29)
CREAT SERPL-MCNC: 1.78 MG/DL (ref 0.76–1.27)
EGFRCR SERPLBLD CKD-EPI 2021: 38.1 ML/MIN/1.73
GLOBULIN SER CALC-MCNC: 2.6 GM/DL
GLUCOSE SERPL-MCNC: 121 MG/DL (ref 65–99)
HBA1C MFR BLD: 6.1 % (ref 4.8–5.6)
HDLC SERPL-MCNC: 36 MG/DL (ref 40–60)
LDLC SERPL CALC-MCNC: 100 MG/DL (ref 0–100)
POTASSIUM SERPL-SCNC: 5.3 MMOL/L (ref 3.5–5.2)
PROT SERPL-MCNC: 7 G/DL (ref 6–8.5)
SODIUM SERPL-SCNC: 145 MMOL/L (ref 136–145)
TRIGL SERPL-MCNC: 172 MG/DL (ref 0–150)
VLDLC SERPL CALC-MCNC: 30 MG/DL (ref 5–40)

## 2023-06-19 ENCOUNTER — TELEPHONE (OUTPATIENT)
Dept: INTERNAL MEDICINE | Age: 83
End: 2023-06-19
Payer: MEDICARE

## 2023-06-19 NOTE — TELEPHONE ENCOUNTER
[facility] INSTRUCTING PT TO RETURN CALL TO BE READ LAB RESULTS. LETTER SENT VIA [facility]

## 2023-06-19 NOTE — TELEPHONE ENCOUNTER
----- Message from EVELIA Rosario sent at 6/14/2023  1:07 PM EDT -----  Please call and schedule 1 week lab     Kidney function is stable with creatinine around 1.78, 1.795 months prior but potassium is elevated at 5.3.  I would recommend decreasing potassium rich foods such as dark leafy greens, potatoes, nuts, etc.  Calcium is also elevated.  Have you been taking any increased calcium, any over-the-counter Tums?  You do appear dehydrated compared to 5 months prior, I would recommend increasing your fluid and avoiding high potassium foods and lets recheck labs in 1 week for potassium, calcium and something called PTH which checks for parathyroid which could be a cause of elevated calcium.  I will go ahead and place these orders now and please schedule the lab appointment

## 2023-07-06 PROBLEM — N39.0 UTI (URINARY TRACT INFECTION): Status: ACTIVE | Noted: 2023-07-06

## 2023-07-06 PROBLEM — R53.1 WEAKNESS: Status: ACTIVE | Noted: 2023-07-06

## 2023-07-07 PROBLEM — R93.0 ABNORMAL CT OF THE HEAD: Status: ACTIVE | Noted: 2023-07-07

## 2023-07-07 PROBLEM — R91.1 NODULE OF UPPER LOBE OF LEFT LUNG: Status: ACTIVE | Noted: 2023-07-07

## 2023-07-07 PROBLEM — R93.5 ABNORMAL CT OF THE ABDOMEN: Status: ACTIVE | Noted: 2023-07-07

## 2023-07-07 PROBLEM — N39.0 ACUTE UTI (URINARY TRACT INFECTION): Status: ACTIVE | Noted: 2023-07-07

## 2023-07-27 ENCOUNTER — TELEPHONE (OUTPATIENT)
Dept: ORTHOPEDIC SURGERY | Facility: CLINIC | Age: 83
End: 2023-07-27

## 2023-07-27 NOTE — TELEPHONE ENCOUNTER
Caller: SONY    Relationship to patient: SISTER-IN-LAW    Best call back number: [phone]    Chief complaint: RIGHT SHOULDER PAIN    Type of visit: INJECTION    Requested date:  NEXT AVAILABLE APPT AT [facility] ON MON OR TUES.    If rescheduling, when is the original appointment:  08.03.23 AT 4:00 PM    Additional notes: PATIENT'S SISTER-IN-LAW, SONY WAS CALLING TO RESCHEDULE PATIENT'S APPT DUE TO THE APPT GETTING SCHEDULED AT [facility] INSTEAD OF [facility]. SONY STATED THEY NOTICED HIS APPT REMINDER STATED THE APPT WAS [facility] BUT [facility] GOING TO THAT OFFICE. SONY WENT AHEAD AND CANCELLED THAT APPT BUT WOULD LIKE TO RESCHEDULE FOR [facility]. THANK YOU!

## 2023-08-01 ENCOUNTER — TELEPHONE (OUTPATIENT)
Dept: INTERNAL MEDICINE | Age: 83
End: 2023-08-01
Payer: MEDICARE

## 2023-08-04 ENCOUNTER — PATIENT OUTREACH (OUTPATIENT)
Dept: CASE MANAGEMENT | Facility: OTHER | Age: 83
End: 2023-08-04
Payer: MEDICARE

## 2023-08-07 ENCOUNTER — CLINICAL SUPPORT (OUTPATIENT)
Dept: ORTHOPEDIC SURGERY | Facility: CLINIC | Age: 83
End: 2023-08-07
Payer: MEDICARE

## 2023-08-07 VITALS — WEIGHT: 163 LBS | TEMPERATURE: 98.3 F | BODY MASS INDEX: 23.34 KG/M2 | HEIGHT: 70 IN

## 2023-08-07 DIAGNOSIS — M12.812 ROTATOR CUFF ARTHROPATHY, LEFT: Primary | ICD-10-CM

## 2023-08-07 RX ORDER — CARVEDILOL 6.25 MG/1
6.25 TABLET ORAL 2 TIMES DAILY WITH MEALS
COMMUNITY
Start: 2023-07-25

## 2023-08-07 RX ADMIN — METHYLPREDNISOLONE ACETATE 80 MG: 80 INJECTION, SUSPENSION INTRA-ARTICULAR; INTRALESIONAL; INTRAMUSCULAR; SOFT TISSUE at 13:54

## 2023-08-07 RX ADMIN — LIDOCAINE HYDROCHLORIDE 2 ML: 10 INJECTION, SOLUTION EPIDURAL; INFILTRATION; INTRACAUDAL; PERINEURAL at 13:54

## 2023-08-07 NOTE — PROGRESS NOTES
Patient: Benja Macedo  YOB: 1940  Date of Service: 8/7/2023    Chief Complaints: Left shoulder pain    Subjective:    History of Present Illness: Pt is seen in the office today with complaints of left shoulder pain I last saw him in April he was noted to have a large rotator cuff tear with some rotator cuff arthropathy we injected him and he did fine he did get considerable relief and he would like to do that again he is not interested in surgical        Allergies: No Known Allergies    Medications:   Home Medications:  Current Outpatient Medications on File Prior to Visit   Medication Sig    acetaminophen (TYLENOL) 500 MG tablet Take 2 tablets by mouth At Night As Needed.    ASPIRIN 81 PO Take 81 mg by mouth Daily.    atorvastatin (LIPITOR) 20 MG tablet TAKE ONE TABLET BY MOUTH DAILY    carvedilol (COREG) 3.125 MG tablet Take 1 tablet by mouth 2 (Two) Times a Day With Meals.    carvedilol (COREG) 6.25 MG tablet     coenzyme Q10 100 MG capsule Take 1 capsule by mouth Daily.    cyclobenzaprine (FLEXERIL) 10 MG tablet Take 1 tablet by mouth 3 (Three) Times a Day As Needed for Muscle Spasms.    fluticasone (FLONASE) 50 MCG/ACT nasal spray 2 sprays into the nostril(s) as directed by provider Daily As Needed.    Multiple Vitamins-Minerals (MULTIVITAMIN ADULT PO) Take  by mouth Daily.    Omega-3 Fatty Acids (fish oil) 1000 MG capsule capsule Take  by mouth Daily With Breakfast.    tamsulosin (FLOMAX) 0.4 MG capsule 24 hr capsule Take 1 capsule by mouth Daily.    vitamin B-12 (CYANOCOBALAMIN) 1000 MCG tablet Take 1 tablet by mouth Daily.    vitamin C (ASCORBIC ACID) 500 MG tablet Take 1 tablet by mouth Daily.    sulfamethoxazole-trimethoprim (Bactrim DS) 800-160 MG per tablet Take 1 tablet by mouth 2 (Two) Times a Day for 38 days. (Patient not taking: Reported on 8/7/2023)     No current facility-administered medications on file prior to visit.     Current Medications:  Scheduled Meds:  Continuous  Infusions:No current facility-administered medications for this visit.    PRN Meds:.    I have reviewed the patient's medical history in detail and updated the computerized patient record.  Review and summarization of old records include:    Past Medical History:   Diagnosis Date    Acute UTI (urinary tract infection)     Diabetes mellitus     Hypertension     Renal insufficiency       History reviewed. No pertinent surgical history.     Social History     Occupational History    Not on file   Tobacco Use    Smoking status: Former     Packs/day: 1.00     Years: 23.00     Pack years: 23.00     Types: Cigarettes     Start date:      Quit date:      Years since quittin.6    Smokeless tobacco: Never   Vaping Use    Vaping Use: Never used   Substance and Sexual Activity    Alcohol use: Yes     Comment: occassional     Drug use: Never    Sexual activity: Defer      Social History     Social History Narrative    Not on file        Family History   Problem Relation Age of Onset    Stroke Mother     Heart attack Father     Stroke Brother     Diverticulitis Brother     Hypertension Brother        ROS: 14 point review of systems was performed and was negative except for documented findings in HPI and today's encounter.     Allergies: No Known Allergies  Constitutional:  Denies fever, shaking or chills   Eyes:  Denies change in visual acuity   HENT:  Denies nasal congestion or sore throat   Respiratory:  Denies cough or shortness of breath   Cardiovascular:  Denies chest pain or severe LE edema   GI:  Denies abdominal pain, nausea, vomiting, bloody stools or diarrhea   Musculoskeletal:  Numbness, tingling, or loss of motor function only as noted above in history of present illness.  : Denies painful urination or hematuria  Integument:  Denies rash, lesion or ulceration   Neurologic:  Denies headache or focal weakness  Endocrine:  Denies lymphadenopathy  Psych:  Denies confusion or change in mental status   Hem:   Denies active bleeding      Physical Exam: 82 y.o. male  Wt Readings from Last 3 Encounters:   08/07/23 73.9 kg (163 lb)   07/06/23 79.4 kg (175 lb)   07/06/23 79.4 kg (175 lb)       Body mass index is 23.39 kg/mý.    Vitals:    08/07/23 1348   Temp: 98.3 øF (36.8 øC)     Vital signs reviewed.   General Appearance:    Alert, cooperative, in no acute distress                    Ortho exam    Exam is unchanged             Assessment:   Left shoulder rotator cuff arthropathy plan is to proceed with injection he understands the surgical options  Plan:   Follow up as indicated.  Ice, elevate, and rest as needed.  Discussed conservative measures of pain control including ice, bracing.  Also talked about the importance of strengthening a  Yenni Mendez M.D.        Patient: Benja Macedo  YOB: 1940  Date of Service: 8/7/2023    Chief Complaints:     Subjective:    History of Present Illness: Pt is seen in the office today with complaints of         Allergies: No Known Allergies    Medications:   Home Medications:  Current Outpatient Medications on File Prior to Visit   Medication Sig    acetaminophen (TYLENOL) 500 MG tablet Take 2 tablets by mouth At Night As Needed.    ASPIRIN 81 PO Take 81 mg by mouth Daily.    atorvastatin (LIPITOR) 20 MG tablet TAKE ONE TABLET BY MOUTH DAILY    carvedilol (COREG) 3.125 MG tablet Take 1 tablet by mouth 2 (Two) Times a Day With Meals.    carvedilol (COREG) 6.25 MG tablet     coenzyme Q10 100 MG capsule Take 1 capsule by mouth Daily.    cyclobenzaprine (FLEXERIL) 10 MG tablet Take 1 tablet by mouth 3 (Three) Times a Day As Needed for Muscle Spasms.    fluticasone (FLONASE) 50 MCG/ACT nasal spray 2 sprays into the nostril(s) as directed by provider Daily As Needed.    Multiple Vitamins-Minerals (MULTIVITAMIN ADULT PO) Take  by mouth Daily.    Omega-3 Fatty Acids (fish oil) 1000 MG capsule capsule Take  by mouth Daily With Breakfast.    tamsulosin (FLOMAX) 0.4 MG capsule  24 hr capsule Take 1 capsule by mouth Daily.    vitamin B-12 (CYANOCOBALAMIN) 1000 MCG tablet Take 1 tablet by mouth Daily.    vitamin C (ASCORBIC ACID) 500 MG tablet Take 1 tablet by mouth Daily.    sulfamethoxazole-trimethoprim (Bactrim DS) 800-160 MG per tablet Take 1 tablet by mouth 2 (Two) Times a Day for 38 days. (Patient not taking: Reported on 2023)     No current facility-administered medications on file prior to visit.     Current Medications:  Scheduled Meds:  Continuous Infusions:No current facility-administered medications for this visit.    PRN Meds:.    I have reviewed the patient's medical history in detail and updated the computerized patient record.  Review and summarization of old records include:    Past Medical History:   Diagnosis Date    Acute UTI (urinary tract infection)     Diabetes mellitus     Hypertension     Renal insufficiency       History reviewed. No pertinent surgical history.     Social History     Occupational History    Not on file   Tobacco Use    Smoking status: Former     Packs/day: 1.00     Years: 23.00     Pack years: 23.00     Types: Cigarettes     Start date:      Quit date:      Years since quittin.6    Smokeless tobacco: Never   Vaping Use    Vaping Use: Never used   Substance and Sexual Activity    Alcohol use: Yes     Comment: occassional     Drug use: Never    Sexual activity: Defer      Social History     Social History Narrative    Not on file        Family History   Problem Relation Age of Onset    Stroke Mother     Heart attack Father     Stroke Brother     Diverticulitis Brother     Hypertension Brother        ROS: 14 point review of systems was performed and was negative except for documented findings in HPI and today's encounter.     Allergies: No Known Allergies  Constitutional:  Denies fever, shaking or chills   Eyes:  Denies change in visual acuity   HENT:  Denies nasal congestion or sore throat   Respiratory:  Denies cough or shortness of  breath   Cardiovascular:  Denies chest pain or severe LE edema   GI:  Denies abdominal pain, nausea, vomiting, bloody stools or diarrhea   Musculoskeletal:  Numbness, tingling, or loss of motor function only as noted above in history of present illness.  : Denies painful urination or hematuria  Integument:  Denies rash, lesion or ulceration   Neurologic:  Denies headache or focal weakness  Endocrine:  Denies lymphadenopathy  Psych:  Denies confusion or change in mental status   Hem:  Denies active bleeding      Physical Exam: 82 y.o. male  Wt Readings from Last 3 Encounters:   08/07/23 73.9 kg (163 lb)   07/06/23 79.4 kg (175 lb)   07/06/23 79.4 kg (175 lb)       Body mass index is 23.39 kg/mý.    Vitals:    08/07/23 1348   Temp: 98.3 øF (36.8 øC)     Vital signs reviewed.   General Appearance:    Alert, cooperative, in no acute distress                    Ortho exam                 Assessment:     Plan:   Follow up as indicated.  Ice, elevate, and rest as needed.  Discussed conservative measures of pain control including ice, bracing.  Also talked about the importance of strengthening and maintaining ideal body weight    Yenni Mendez M.D.  Large Joint Arthrocentesis: L subacromial bursa  Date/Time: 8/7/2023 1:54 PM  Consent given by: patient  Site marked: site marked  Timeout: Immediately prior to procedure a time out was called to verify the correct patient, procedure, equipment, support staff and site/side marked as required   Supporting Documentation  Indications: pain   Procedure Details  Location: shoulder - L subacromial bursa  Preparation: Patient was prepped and draped in the usual sterile fashion  Needle gauge: 21G.  Approach: posterior  Medications administered: 80 mg methylPREDNISolone acetate 80 MG/ML; 2 mL lidocaine PF 1% 1 %  Patient tolerance: patient tolerated the procedure well with no immediate complications

## 2023-08-08 ENCOUNTER — OFFICE VISIT (OUTPATIENT)
Dept: INTERNAL MEDICINE | Age: 83
End: 2023-08-08
Payer: MEDICARE

## 2023-08-08 VITALS
TEMPERATURE: 97.1 F | SYSTOLIC BLOOD PRESSURE: 128 MMHG | WEIGHT: 163 LBS | HEART RATE: 74 BPM | HEIGHT: 70 IN | DIASTOLIC BLOOD PRESSURE: 66 MMHG | OXYGEN SATURATION: 99 % | BODY MASS INDEX: 23.34 KG/M2

## 2023-08-08 DIAGNOSIS — R91.1 LUNG NODULE: ICD-10-CM

## 2023-08-08 DIAGNOSIS — N41.9 PROSTATITIS, UNSPECIFIED PROSTATITIS TYPE: ICD-10-CM

## 2023-08-08 DIAGNOSIS — N17.9 ACUTE KIDNEY INJURY: Primary | ICD-10-CM

## 2023-08-08 RX ORDER — METHYLPREDNISOLONE ACETATE 80 MG/ML
80 INJECTION, SUSPENSION INTRA-ARTICULAR; INTRALESIONAL; INTRAMUSCULAR; SOFT TISSUE
Status: COMPLETED | OUTPATIENT
Start: 2023-08-07 | End: 2023-08-07

## 2023-08-08 RX ORDER — LIDOCAINE HYDROCHLORIDE 10 MG/ML
2 INJECTION, SOLUTION EPIDURAL; INFILTRATION; INTRACAUDAL; PERINEURAL
Status: COMPLETED | OUTPATIENT
Start: 2023-08-07 | End: 2023-08-07

## 2023-08-08 RX ORDER — CYCLOBENZAPRINE HCL 10 MG
10 TABLET ORAL 3 TIMES DAILY PRN
Qty: 90 TABLET | Refills: 0 | Status: SHIPPED | OUTPATIENT
Start: 2023-08-08

## 2023-08-08 NOTE — PROGRESS NOTES
"[facility] [place] [facility]  EVELIA Rosario / 82 y.o. / male  08/08/2023      CC:   Hospital Follow Up Visit (Acute UTI, MIKAELA)      VITALS    Visit Vitals  /66 (Cuff Size: Adult)   Pulse 74   Temp 97.1 øF (36.2 øC) (Temporal)   Ht 177.8 cm (70\")   Wt 73.9 kg (163 lb) Comment: stated   SpO2 99%   BMI 23.39 kg/mý       BP Readings from Last 3 Encounters:   08/08/23 128/66   07/10/23 148/79   07/06/23 (!) 70/48     Wt Readings from Last 3 Encounters:   08/08/23 73.9 kg (163 lb)   08/07/23 73.9 kg (163 lb)   07/06/23 79.4 kg (175 lb)      Body mass index is 23.39 kg/mý.    HPI:     Date of admission/discharge: 07/06/2023 - 07/10/2023  Hospital: [facility]  Principle Dx: Acute UTI, acute kidney injury  Secondary Dx: Leukocytosis, anemia, weakness, hypotension, nodule of upper lobe of left lung  History prior to hospitalization: Patient was originally sent from my office due to hypotension and gradual decline over the last couple of months worse in the last few days prior to ER visit.  He has a prior history of stroke with right-sided weakness, chronic kidney disease, diabetes, hypertension and hyperlipidemia.  Lives with brother and brother's wife.  In the ER he was also found to be hypotensive, leukocytosis and worsening renal function.  Evaluation/Treatment: Urinalysis consistent with urinary tract infection, CT showed groundglass infiltrate in both lung bases left upper lobe micronodule 2 mm and cystitis.  He also had a tubular structure right inguinal canal that was not seen on follow-up ultrasound.  He had an abnormal head CT findings related to his ear, ENT felt changes were chronic and old and as he had no acute symptoms thought it was unlikely to be related with current clinical course.  Urology and infectious disease were consulted.  Diagnosed with UTI versus prostatitis.  He was treated with ceftriaxone with improvement in symptoms and leukocytosis normalized.  Acute " kidney injury resolved and renal function returned to near baseline.  Infectious disease recommended 6 weeks of antibiotics and patient selected oral Bactrim.  Urology recommended 3 weeks of antibiotics and to see him in 2 weeks with repeat PSA.  Hospitalist discussed with patient a 3 versus 6-week course and the patient elected to have a 6-week course with monitoring for renal insufficiency and hyperkalemia with a BMP recommended in 1 week.  CT of the chest was discussed with them, and he is unsure if he would proceed with treatment even if the lung nodule is cancerous.  Past smoker quit 30 years prior.  CT imaging was recommended in 6 to 12 months if patient did decide to proceed.  He was discharged to a skilled nursing facility.  Course:   Urologist yesterday follow-up in 1 month for repeat PSA. Dr. Yenni Mendez yesterday left shoulder steroid injection.     [facility] last week, OT/PT plan of care and therapy pending.     He believes he had BMP performed recently requesting record request rather than additional lab draw today.    Today overt weakness has resolved, feeling much better.      Patient Care Team:  Yo Mckeon APRN as PCP - General (Internal Medicine)  Luz Marina Solano MD as Consulting Physician (Nephrology)  ____________________________________________________________________    ASSESSMENT & PLAN:    1. Acute kidney injury    2. Lung nodule    3. Prostatitis, unspecified prostatitis type      No orders of the defined types were placed in this encounter.      Summary/Discussion:  Patient will schedule follow-up nephrology appointment  Continue management with urology, plan follow-up in 1 month to repeat PSA.  Has completed antibiotic course.  Will request records release from [place] with last BMP, if not we will bring back in for labs.  His creatinine before discharge did improve back to 1.44 with stable potassium levels.  Continue home health    No follow-ups on  file.    ____________________________________________________________________    REVIEW OF SYSTEMS    Review of Systems  Constitutional neg except per HPI   Resp neg  CV neg     PHYSICAL EXAMINATION    Physical Exam  Constitutional  No distress  Cardiovascular Rate  normal . Rhythm: regular . Heart sounds:  normal  Pulmonary/Chest  Effort normal. Breath sounds:  normal  Psychiatric  Alert. Judgment and thought content normal. Mood normal      REVIEWED DATA:    Labs:   No visits with results within 14 Day(s) from this visit.   Latest known visit with results is:   Admission on 07/06/2023, Discharged on 07/10/2023   Component Date Value Ref Range Status    Glucose 07/06/2023 99  65 - 99 mg/dL Final    BUN 07/06/2023 56 (H)  8 - 23 mg/dL Final    Creatinine 07/06/2023 2.43 (H)  0.76 - 1.27 mg/dL Final    Sodium 07/06/2023 138  136 - 145 mmol/L Final    Potassium 07/06/2023 4.4  3.5 - 5.2 mmol/L Final    Slight hemolysis detected by analyzer. Results may be affected.    Chloride 07/06/2023 104  98 - 107 mmol/L Final    CO2 07/06/2023 24.0  22.0 - 29.0 mmol/L Final    Calcium 07/06/2023 9.7  8.6 - 10.5 mg/dL Final    Total Protein 07/06/2023 6.4  6.0 - 8.5 g/dL Final    Albumin 07/06/2023 3.2 (L)  3.5 - 5.2 g/dL Final    ALT (SGPT) 07/06/2023 19  1 - 41 U/L Final    AST (SGOT) 07/06/2023 29  1 - 40 U/L Final    Slight hemolysis detected by analyzer. Results may be affected.    Alkaline Phosphatase 07/06/2023 68  39 - 117 U/L Final    Total Bilirubin 07/06/2023 0.4  0.0 - 1.2 mg/dL Final    Globulin 07/06/2023 3.2  gm/dL Final    A/G Ratio 07/06/2023 1.0  g/dL Final    BUN/Creatinine Ratio 07/06/2023 23.0  7.0 - 25.0 Final    Anion Gap 07/06/2023 10.0  5.0 - 15.0 mmol/L Final    eGFR 07/06/2023 26.2 (L)  >60.0 mL/min/1.73 Final    Color, UA 07/06/2023 Yellow  Yellow, Straw Final    Appearance, UA 07/06/2023 Cloudy (A)  Clear Final    pH, UA 07/06/2023 5.5  5.0 - 8.0 Final    Specific Gravity, UA 07/06/2023 1.015  1.005 -  1.030 Final    Glucose, UA 07/06/2023 Negative  Negative Final    Ketones, UA 07/06/2023 Negative  Negative Final    Bilirubin, UA 07/06/2023 Negative  Negative Final    Blood, UA 07/06/2023 Large (3+) (A)  Negative Final    Protein, UA 07/06/2023 30 mg/dL (1+) (A)  Negative Final    Leuk Esterase, UA 07/06/2023 Moderate (2+) (A)  Negative Final    Nitrite, UA 07/06/2023 Negative  Negative Final    Urobilinogen, UA 07/06/2023 0.2 E.U./dL  0.2 - 1.0 E.U./dL Final    HS Troponin T 07/06/2023 51 (H)  <15 ng/L Final    proBNP 07/06/2023 1,992.0 (H)  0.0 - 1,800.0 pg/mL Final    Lactate 07/06/2023 1.6  0.5 - 2.0 mmol/L Final    Blood Culture 07/06/2023 No growth at 5 days   Final    Blood Culture 07/06/2023 No growth at 5 days   Final    QT Interval 07/06/2023 384  ms Final    WBC 07/06/2023 31.92 (C)  3.40 - 10.80 10*3/mm3 Final    RBC 07/06/2023 2.82 (L)  4.14 - 5.80 10*6/mm3 Final    Hemoglobin 07/06/2023 9.4 (L)  13.0 - 17.7 g/dL Final    Hematocrit 07/06/2023 28.0 (L)  37.5 - 51.0 % Final    MCV 07/06/2023 99.3 (H)  79.0 - 97.0 fL Final    MCH 07/06/2023 33.3 (H)  26.6 - 33.0 pg Final    MCHC 07/06/2023 33.6  31.5 - 35.7 g/dL Final    RDW 07/06/2023 12.3  12.3 - 15.4 % Final    RDW-SD 07/06/2023 44.7  37.0 - 54.0 fl Final    MPV 07/06/2023 10.4  6.0 - 12.0 fL Final    Platelets 07/06/2023 246  140 - 450 10*3/mm3 Final    Neutrophil % 07/06/2023 89.0 (H)  42.7 - 76.0 % Final    Lymphocyte % 07/06/2023 4.6 (L)  19.6 - 45.3 % Final    Monocyte % 07/06/2023 4.5 (L)  5.0 - 12.0 % Final    Eosinophil % 07/06/2023 0.1 (L)  0.3 - 6.2 % Final    Basophil % 07/06/2023 0.2  0.0 - 1.5 % Final    Neutrophils, Absolute 07/06/2023 28.41 (H)  1.70 - 7.00 10*3/mm3 Final    Lymphocytes, Absolute 07/06/2023 1.47  0.70 - 3.10 10*3/mm3 Final    Monocytes, Absolute 07/06/2023 1.45 (H)  0.10 - 0.90 10*3/mm3 Final    Eosinophils, Absolute 07/06/2023 0.04  0.00 - 0.40 10*3/mm3 Final    Basophils, Absolute 07/06/2023 0.05  0.00 - 0.20  10*3/mm3 Final    HS Troponin T 07/06/2023 36 (H)  <15 ng/L Final    Troponin T Delta 07/06/2023 -15 (L)  >=-4 - <+4 ng/L Final    RBC, UA 07/06/2023 13-20 (A)  None Seen, 0-2 /HPF Final    WBC, UA 07/06/2023 Too Numerous to Count (A)  None Seen, 0-2 /HPF Final    Bacteria, UA 07/06/2023 3+ (A)  None Seen /HPF Final    Squamous Epithelial Cells, UA 07/06/2023 None Seen  None Seen, 0-2 /HPF Final    Hyaline Casts, UA 07/06/2023 0-2  None Seen /LPF Final    Methodology 07/06/2023 Manual Light Microscopy   Final    Fecal Occult Blood 07/06/2023 Negative  Negative Final    Lot Number 07/06/2023 [id number]   Final    Expiration Date 07/06/2023 03/31/2024   Final    Positive Control 07/06/2023 Positive  Positive Final    Negative Control 07/06/2023 Negative  Negative Final    PSA 07/06/2023 29.300 (H)  0.000 - 4.000 ng/mL Final    Urine Culture 07/06/2023 >100,000 CFU/mL Escherichia coli (A)   Final    Glucose 07/07/2023 100 (H)  65 - 99 mg/dL Final    BUN 07/07/2023 50 (H)  8 - 23 mg/dL Final    Creatinine 07/07/2023 1.89 (H)  0.76 - 1.27 mg/dL Final    Sodium 07/07/2023 140  136 - 145 mmol/L Final    Potassium 07/07/2023 4.1  3.5 - 5.2 mmol/L Final    Chloride 07/07/2023 109 (H)  98 - 107 mmol/L Final    CO2 07/07/2023 23.0  22.0 - 29.0 mmol/L Final    Calcium 07/07/2023 9.4  8.6 - 10.5 mg/dL Final    Total Protein 07/07/2023 5.6 (L)  6.0 - 8.5 g/dL Final    Albumin 07/07/2023 2.7 (L)  3.5 - 5.2 g/dL Final    ALT (SGPT) 07/07/2023 20  1 - 41 U/L Final    AST (SGOT) 07/07/2023 23  1 - 40 U/L Final    Alkaline Phosphatase 07/07/2023 63  39 - 117 U/L Final    Total Bilirubin 07/07/2023 0.2  0.0 - 1.2 mg/dL Final    Globulin 07/07/2023 2.9  gm/dL Final    A/G Ratio 07/07/2023 0.9  g/dL Final    BUN/Creatinine Ratio 07/07/2023 26.5 (H)  7.0 - 25.0 Final    Anion Gap 07/07/2023 8.0  5.0 - 15.0 mmol/L Final    eGFR 07/07/2023 35.4 (L)  >60.0 mL/min/1.73 Final    WBC 07/07/2023 21.78 (H)  3.40 - 10.80 10*3/mm3 Final    RBC 07/07/2023  2.71 (L)  4.14 - 5.80 10*6/mm3 Final    Hemoglobin 07/07/2023 9.0 (L)  13.0 - 17.7 g/dL Final    Hematocrit 07/07/2023 26.8 (L)  37.5 - 51.0 % Final    MCV 07/07/2023 98.9 (H)  79.0 - 97.0 fL Final    MCH 07/07/2023 33.2 (H)  26.6 - 33.0 pg Final    MCHC 07/07/2023 33.6  31.5 - 35.7 g/dL Final    RDW 07/07/2023 12.4  12.3 - 15.4 % Final    RDW-SD 07/07/2023 44.4  37.0 - 54.0 fl Final    MPV 07/07/2023 10.3  6.0 - 12.0 fL Final    Platelets 07/07/2023 245  140 - 450 10*3/mm3 Final    Neutrophil % 07/07/2023 88.0 (H)  42.7 - 76.0 % Final    Lymphocyte % 07/07/2023 5.8 (L)  19.6 - 45.3 % Final    Monocyte % 07/07/2023 4.5 (L)  5.0 - 12.0 % Final    Eosinophil % 07/07/2023 0.6  0.3 - 6.2 % Final    Basophil % 07/07/2023 0.1  0.0 - 1.5 % Final    Immature Grans % 07/07/2023 1.0 (H)  0.0 - 0.5 % Final    Neutrophils, Absolute 07/07/2023 19.18 (H)  1.70 - 7.00 10*3/mm3 Final    Lymphocytes, Absolute 07/07/2023 1.27  0.70 - 3.10 10*3/mm3 Final    Monocytes, Absolute 07/07/2023 0.97 (H)  0.10 - 0.90 10*3/mm3 Final    Eosinophils, Absolute 07/07/2023 0.12  0.00 - 0.40 10*3/mm3 Final    Basophils, Absolute 07/07/2023 0.03  0.00 - 0.20 10*3/mm3 Final    Immature Grans, Absolute 07/07/2023 0.21 (H)  0.00 - 0.05 10*3/mm3 Final    nRBC 07/07/2023 0.0  0.0 - 0.2 /100 WBC Final    Lactate 07/07/2023 1.0  0.5 - 2.0 mmol/L Final    Hemoglobin A1C 07/07/2023 5.60  4.80 - 5.60 % Final    Glucose 07/08/2023 103 (H)  65 - 99 mg/dL Final    BUN 07/08/2023 39 (H)  8 - 23 mg/dL Final    Creatinine 07/08/2023 1.44 (H)  0.76 - 1.27 mg/dL Final    Sodium 07/08/2023 138  136 - 145 mmol/L Final    Potassium 07/08/2023 3.9  3.5 - 5.2 mmol/L Final    Chloride 07/08/2023 109 (H)  98 - 107 mmol/L Final    CO2 07/08/2023 21.4 (L)  22.0 - 29.0 mmol/L Final    Calcium 07/08/2023 9.2  8.6 - 10.5 mg/dL Final    BUN/Creatinine Ratio 07/08/2023 27.1 (H)  7.0 - 25.0 Final    Anion Gap 07/08/2023 7.6  5.0 - 15.0 mmol/L Final    eGFR 07/08/2023 49.1 (L)  >60.0  mL/min/1.73 Final    WBC 07/08/2023 12.31 (H)  3.40 - 10.80 10*3/mm3 Final    RBC 07/08/2023 2.63 (L)  4.14 - 5.80 10*6/mm3 Final    Hemoglobin 07/08/2023 8.6 (L)  13.0 - 17.7 g/dL Final    Hematocrit 07/08/2023 25.6 (L)  37.5 - 51.0 % Final    MCV 07/08/2023 97.3 (H)  79.0 - 97.0 fL Final    MCH 07/08/2023 32.7  26.6 - 33.0 pg Final    MCHC 07/08/2023 33.6  31.5 - 35.7 g/dL Final    RDW 07/08/2023 12.5  12.3 - 15.4 % Final    RDW-SD 07/08/2023 44.4  37.0 - 54.0 fl Final    MPV 07/08/2023 10.4  6.0 - 12.0 fL Final    Platelets 07/08/2023 253  140 - 450 10*3/mm3 Final       Imaging:   No results found.     Medical Tests:        Summary of old records / correspondence / consultant report:   DC summary re: issues addressed on HPI    Request outside records:       ALLERGIES  No Known Allergies     MEDICATIONS   Current Outpatient Medications   Medication Sig Dispense Refill    acetaminophen (TYLENOL) 500 MG tablet Take 2 tablets by mouth At Night As Needed.      ASPIRIN 81 PO Take 81 mg by mouth Daily.      atorvastatin (LIPITOR) 20 MG tablet TAKE ONE TABLET BY MOUTH DAILY 30 tablet 11    carvedilol (COREG) 6.25 MG tablet Take 1 tablet by mouth 2 (Two) Times a Day With Meals.      coenzyme Q10 100 MG capsule Take 1 capsule by mouth Daily.      cyclobenzaprine (FLEXERIL) 10 MG tablet Take 1 tablet by mouth 3 (Three) Times a Day As Needed for Muscle Spasms. 90 tablet 0    fluticasone (FLONASE) 50 MCG/ACT nasal spray 2 sprays into the nostril(s) as directed by provider Daily As Needed.      Multiple Vitamins-Minerals (MULTIVITAMIN ADULT PO) Take  by mouth Daily.      Omega-3 Fatty Acids (fish oil) 1000 MG capsule capsule Take  by mouth Daily With Breakfast.      vitamin B-12 (CYANOCOBALAMIN) 1000 MCG tablet Take 1 tablet by mouth Daily.      vitamin C (ASCORBIC ACID) 500 MG tablet Take 1 tablet by mouth Daily.       No current facility-administered medications for this visit.       Current outpatient and discharge  medications have been reconciled for the patient.  Reviewed by: EVELIA Rosario       [facility]:     The following portions of the patient's history were reviewed and updated as appropriate: Allergies / Current Medications / Past Medical History / Surgical History / Social History / Family History    PROBLEM LIST   Patient Active Problem List   Diagnosis    Benign essential hypertension    Gastroesophageal reflux disease    Hyperglycemia    Hyperlipidemia    Nocturia    Renal insufficiency    Cerebrovascular accident - history of    Type 2 diabetes mellitus    Benign hypertension with chronic kidney disease    Stage 3b chronic kidney disease    Edema of lower extremity    Dyslipidemia    History of cerebrovascular accident    Pain of left shoulder joint on movement    Weakness    UTI (urinary tract infection)    Acute UTI (urinary tract infection)    Nodule of upper lobe of left lung    Abnormal CT of the abdomen    Abnormal CT of the head       PAST MEDICAL HISTORY  Past Medical History:   Diagnosis Date    Acute UTI (urinary tract infection)     Diabetes mellitus     Hypertension     Renal insufficiency        SURGICAL HISTORY  History reviewed. No pertinent surgical history.    SOCIAL HISTORY  Social History     Socioeconomic History    Marital status: Single   Tobacco Use    Smoking status: Former     Packs/day: 1.00     Years: 23.00     Pack years: 23.00     Types: Cigarettes     Start date:      Quit date:      Years since quittin.6    Smokeless tobacco: Never   Vaping Use    Vaping Use: Never used   Substance and Sexual Activity    Alcohol use: Yes     Comment: occassional     Drug use: Never    Sexual activity: Defer       FAMILY HISTORY  Family History   Problem Relation Age of Onset    Stroke Mother     Heart attack Father     Stroke Brother     Diverticulitis Brother     Hypertension Brother        Examiner was wearing KN95 mask, face shield and exam gloves during the entire duration of the  visit. Patient was masked the entire time.   Minimum social distance of 6 ft maintained entire visit except if physical contact was necessary as documented.     **Dragon Disclaimer:   Much of this encounter note is an electronic transcription/translation of spoken language to printed text. The electronic translation of spoken language may permit erroneous, or at times, nonsensical words or phrases to be inadvertently transcribed. Although I have reviewed the note for such errors, some may still exist.

## 2023-08-21 DIAGNOSIS — E78.2 MIXED HYPERLIPIDEMIA: ICD-10-CM

## 2023-08-21 RX ORDER — ATORVASTATIN CALCIUM 20 MG/1
TABLET, FILM COATED ORAL
Qty: 90 TABLET | Refills: 1 | Status: SHIPPED | OUTPATIENT
Start: 2023-08-21

## 2023-09-27 ENCOUNTER — TELEPHONE (OUTPATIENT)
Dept: INTERNAL MEDICINE | Age: 83
End: 2023-09-27
Payer: MEDICARE

## 2023-09-27 NOTE — TELEPHONE ENCOUNTER
Caller: Sendy Macedo     Relationship: SISTER IN LAW    Best call back number: [phone]     What is your medical concern? PATIENT FELL TODAY      PER THE SISTER IN LAW, THE PATIENT IS SHOWING SIGNS OF WEAKNESS AND WANTS TO KNOW WHAT THEY SHOULD DO.  HE DID NOT GO TO THE ER AFTER THE FALL.  HE WAS CHECKED BY THE EMS AND HE HAD A SCRAPE ON HIS ARM AND THEY BANDAGED IT UP.    PLEASE CALL SENDY BACK TO DISCUSS PATIENT'S MEDICAL STATUS.

## 2023-09-28 ENCOUNTER — EXTERNAL PBMM DATA (OUTPATIENT)
Dept: PHARMACY | Facility: OTHER | Age: 83
End: 2023-09-28
Payer: MEDICARE

## 2023-09-28 NOTE — TELEPHONE ENCOUNTER
Spoke with sister in law. Pt c/o LLE more than BLE, but both are present. She stated he is just going down hill. Pt did fall, hit his head, and abrasion to LUE was cleaned and bandaged by EMS. Pt did not go unconscious.  Pt was not taken to ER.   Pt is A+O this am, but resting in bed. If he needs to go to ER, brother Art will take him.  Please advised

## 2023-10-11 ENCOUNTER — POP HEALTH PHARMACY (OUTPATIENT)
Dept: PHARMACY | Facility: OTHER | Age: 83
End: 2023-10-11
Payer: MEDICARE

## 2023-10-11 NOTE — PROGRESS NOTES
Population Health Pharmacy Outreach      Benja Macedo was called today to discuss medication adherence with [employer] ([facility] REDUCTASE [employer]HIBIT[employer]) , as he was identified as having care opportunities.    Program Details    [facility]  Status: Enrolled  Effective Dates: 10/11/2023 - present  Responsible Staff: Ethel Downing Identified    Adherence- Cholesterol       Adherence and Medication Management        General Medication Management    Type of medication management: targeted medication review  Referred by: insurance group  Recipient: caregiver  Provider: pharmacist - other  Visit type: initial  Method of contact: by telephone                             Medication Therapy Problems     Medication Therapy Recommendations  No medication therapy recommendations to display      Summary  Spoke with Mr. Macedo family member and made them aware that a prescription had been sent into the pharmacy on 8/21/23 and should be on file to . No other barriers noted.  Medication Management Summary    Topics discussed: reminder to refill or  medication discussed         Ehtel Downing, 10/11/23, 2:25 PM EDT.

## 2023-10-23 RX ORDER — CARVEDILOL 6.25 MG/1
6.25 TABLET ORAL 2 TIMES DAILY WITH MEALS
Qty: 180 TABLET | Refills: 1 | Status: SHIPPED | OUTPATIENT
Start: 2023-10-23

## 2023-10-30 ENCOUNTER — POP HEALTH PHARMACY (OUTPATIENT)
Dept: PHARMACY | Facility: OTHER | Age: 83
End: 2023-10-30
Payer: MEDICARE

## 2023-11-06 NOTE — PROGRESS NOTES
Patient: Benja Macedo  YOB: 1940  Date of Service: 11/6/2023    Chief Complaints: Left shoulder pain    Subjective:    History of Present Illness: Pt is seen in the office today with complaints of left shoulder pain for the left shoulder I last saw him in July he was found to have rotator cuff arthropathy he did well with injection he like to do another 1.  We did talk about options including reverse shoulder arthroplasty which he understands right now and wants to continue doing injections        Allergies: No Known Allergies    Medications:   Home Medications:  Current Outpatient Medications on File Prior to Visit   Medication Sig    acetaminophen (TYLENOL) 500 MG tablet Take 2 tablets by mouth At Night As Needed.    ASPIRIN 81 PO Take 81 mg by mouth Daily.    atorvastatin (LIPITOR) 20 MG tablet TAKE ONE TABLET BY MOUTH DAILY    carvedilol (COREG) 6.25 MG tablet TAKE ONE TABLET BY MOUTH TWICE A DAY WITH MEALS    coenzyme Q10 100 MG capsule Take 1 capsule by mouth Daily.    cyclobenzaprine (FLEXERIL) 10 MG tablet Take 1 tablet by mouth 3 (Three) Times a Day As Needed for Muscle Spasms.    fluticasone (FLONASE) 50 MCG/ACT nasal spray 2 sprays into the nostril(s) as directed by provider Daily As Needed.    Multiple Vitamins-Minerals (MULTIVITAMIN ADULT PO) Take  by mouth Daily.    Omega-3 Fatty Acids (fish oil) 1000 MG capsule capsule Take  by mouth Daily With Breakfast.    vitamin B-12 (CYANOCOBALAMIN) 1000 MCG tablet Take 1 tablet by mouth Daily.    vitamin C (ASCORBIC ACID) 500 MG tablet Take 1 tablet by mouth Daily.     No current facility-administered medications on file prior to visit.     Current Medications:  Scheduled Meds:  Continuous Infusions:No current facility-administered medications for this visit.    PRN Meds:.    I have reviewed the patient's medical history in detail and updated the computerized patient record.  Review and summarization of old records include:    Past  Medical History:   Diagnosis Date    Acute UTI (urinary tract infection)     Diabetes mellitus     Hypertension     Renal insufficiency       No past surgical history on file.     Social History     Occupational History    Not on file   Tobacco Use    Smoking status: Former     Packs/day: 1.00     Years: 23.00     Additional pack years: 0.00     Total pack years: 23.00     Types: Cigarettes     Start date:      Quit date:      Years since quittin.8    Smokeless tobacco: Never   Vaping Use    Vaping Use: Never used   Substance and Sexual Activity    Alcohol use: Yes     Comment: occassional     Drug use: Never    Sexual activity: Defer      Social History     Social History Narrative    Not on file        Family History   Problem Relation Age of Onset    Stroke Mother     Heart attack Father     Stroke Brother     Diverticulitis Brother     Hypertension Brother        ROS: 14 point review of systems was performed and was negative except for documented findings in HPI and today's encounter.     Allergies: No Known Allergies  Constitutional:  Denies fever, shaking or chills   Eyes:  Denies change in visual acuity   HENT:  Denies nasal congestion or sore throat   Respiratory:  Denies cough or shortness of breath   Cardiovascular:  Denies chest pain or severe LE edema   GI:  Denies abdominal pain, nausea, vomiting, bloody stools or diarrhea   Musculoskeletal:  Numbness, tingling, or loss of motor function only as noted above in history of present illness.  : Denies painful urination or hematuria  Integument:  Denies rash, lesion or ulceration   Neurologic:  Denies headache or focal weakness  Endocrine:  Denies lymphadenopathy  Psych:  Denies confusion or change in mental status   Hem:  Denies active bleeding      Physical Exam: 83 y.o. male  Wt Readings from Last 3 Encounters:   23 73.9 kg (163 lb)   23 73.9 kg (163 lb)   23 79.4 kg (175 lb)       There is no height or weight on file to  calculate BMI.    There were no vitals filed for this visit.  Vital signs reviewed.   General Appearance:    Alert, cooperative, in no acute distress                    Ortho exam          Exam is unchanged      Assessment: Left shoulder rotator cuff arthropathy    Plan:   Follow up as indicated.  Ice, elevate, and rest as needed.  Discussed conservative measures of pain control including ice, bracing.  He understands his options of injections and reverse arthroplasty I will see him back 1 more time after that I will probably have him see Johnathon for just injections  Yenni Mendez M.D.    Large Joint Arthrocentesis: L subacromial bursa  Date/Time: 11/7/2023 1:51 PM  Consent given by: patient  Site marked: site marked  Timeout: Immediately prior to procedure a time out was called to verify the correct patient, procedure, equipment, support staff and site/side marked as required   Supporting Documentation  Indications: pain   Procedure Details  Location: shoulder - L subacromial bursa  Preparation: Patient was prepped and draped in the usual sterile fashion  Needle gauge: 21G.  Approach: posterior  Medications administered: 80 mg methylPREDNISolone acetate 80 MG/ML; 2 mL lidocaine PF 1% 1 %  Patient tolerance: patient tolerated the procedure well with no immediate complications

## 2023-11-07 ENCOUNTER — CLINICAL SUPPORT (OUTPATIENT)
Dept: ORTHOPEDIC SURGERY | Facility: CLINIC | Age: 83
End: 2023-11-07
Payer: MEDICARE

## 2023-11-07 VITALS — BODY MASS INDEX: 23.41 KG/M2 | TEMPERATURE: 97.8 F | WEIGHT: 163.5 LBS | HEIGHT: 70 IN

## 2023-11-07 DIAGNOSIS — M12.812 ROTATOR CUFF ARTHROPATHY, LEFT: Primary | ICD-10-CM

## 2023-11-07 RX ADMIN — LIDOCAINE HYDROCHLORIDE 2 ML: 10 INJECTION, SOLUTION EPIDURAL; INFILTRATION; INTRACAUDAL; PERINEURAL at 13:51

## 2023-11-07 RX ADMIN — METHYLPREDNISOLONE ACETATE 80 MG: 80 INJECTION, SUSPENSION INTRA-ARTICULAR; INTRALESIONAL; INTRAMUSCULAR; SOFT TISSUE at 13:51

## 2023-11-12 RX ORDER — LIDOCAINE HYDROCHLORIDE 10 MG/ML
2 INJECTION, SOLUTION EPIDURAL; INFILTRATION; INTRACAUDAL; PERINEURAL
Status: COMPLETED | OUTPATIENT
Start: 2023-11-07 | End: 2023-11-07

## 2023-11-12 RX ORDER — METHYLPREDNISOLONE ACETATE 80 MG/ML
80 INJECTION, SUSPENSION INTRA-ARTICULAR; INTRALESIONAL; INTRAMUSCULAR; SOFT TISSUE
Status: COMPLETED | OUTPATIENT
Start: 2023-11-07 | End: 2023-11-07

## 2023-11-16 RX ORDER — CYCLOBENZAPRINE HCL 10 MG
10 TABLET ORAL 3 TIMES DAILY PRN
Qty: 90 TABLET | Refills: 1 | Status: SHIPPED | OUTPATIENT
Start: 2023-11-16

## 2023-12-28 ENCOUNTER — POP HEALTH PHARMACY (OUTPATIENT)
Dept: PHARMACY | Facility: OTHER | Age: 83
End: 2023-12-28
Payer: MEDICARE

## 2024-01-30 ENCOUNTER — OFFICE VISIT (OUTPATIENT)
Dept: INTERNAL MEDICINE | Age: 84
End: 2024-01-30
Payer: MEDICARE

## 2024-01-30 ENCOUNTER — LAB (OUTPATIENT)
Facility: HOSPITAL | Age: 84
End: 2024-01-30
Payer: MEDICARE

## 2024-01-30 VITALS
OXYGEN SATURATION: 95 % | DIASTOLIC BLOOD PRESSURE: 68 MMHG | TEMPERATURE: 97.9 F | WEIGHT: 165.2 LBS | BODY MASS INDEX: 23.65 KG/M2 | SYSTOLIC BLOOD PRESSURE: 116 MMHG | HEIGHT: 70 IN | HEART RATE: 86 BPM

## 2024-01-30 DIAGNOSIS — R53.1 WEAKNESS: ICD-10-CM

## 2024-01-30 DIAGNOSIS — Z23 NEED FOR VACCINATION: ICD-10-CM

## 2024-01-30 DIAGNOSIS — R97.20 ELEVATED PSA: ICD-10-CM

## 2024-01-30 DIAGNOSIS — M17.0 OSTEOARTHRITIS OF BOTH KNEES, UNSPECIFIED OSTEOARTHRITIS TYPE: ICD-10-CM

## 2024-01-30 DIAGNOSIS — E78.2 MIXED HYPERLIPIDEMIA: ICD-10-CM

## 2024-01-30 DIAGNOSIS — E11.9 TYPE 2 DIABETES MELLITUS WITHOUT COMPLICATION, WITHOUT LONG-TERM CURRENT USE OF INSULIN: ICD-10-CM

## 2024-01-30 DIAGNOSIS — Z23 NEED FOR COVID-19 VACCINE: ICD-10-CM

## 2024-01-30 DIAGNOSIS — L98.9 SKIN LESION: ICD-10-CM

## 2024-01-30 DIAGNOSIS — Z00.00 MEDICARE ANNUAL WELLNESS VISIT, SUBSEQUENT: Primary | ICD-10-CM

## 2024-01-30 DIAGNOSIS — I10 BENIGN ESSENTIAL HYPERTENSION: ICD-10-CM

## 2024-01-30 PROBLEM — E55.9 VITAMIN D DEFICIENCY: Status: ACTIVE | Noted: 2023-01-19

## 2024-01-30 LAB
CHOLEST SERPL-MCNC: 145 MG/DL (ref 0–200)
HBA1C MFR BLD: 6 % (ref 4.8–5.6)
HDLC SERPL QL: 3.45
HDLC SERPL-MCNC: 42 MG/DL (ref 40–60)
LDLC SERPL CALC-MCNC: 80 MG/DL (ref 0–100)
PSA SERPL-MCNC: 1.97 NG/ML (ref 0–4)
TRIGL SERPL-MCNC: 127 MG/DL (ref 0–150)
VLDLC SERPL-MCNC: 23 MG/DL (ref 5–40)

## 2024-01-30 PROCEDURE — 90662 IIV NO PRSV INCREASED AG IM: CPT | Performed by: NURSE PRACTITIONER

## 2024-01-30 PROCEDURE — 36415 COLL VENOUS BLD VENIPUNCTURE: CPT | Performed by: NURSE PRACTITIONER

## 2024-01-30 PROCEDURE — 3074F SYST BP LT 130 MM HG: CPT | Performed by: NURSE PRACTITIONER

## 2024-01-30 PROCEDURE — 80061 LIPID PANEL: CPT | Performed by: NURSE PRACTITIONER

## 2024-01-30 PROCEDURE — G0439 PPPS, SUBSEQ VISIT: HCPCS | Performed by: NURSE PRACTITIONER

## 2024-01-30 PROCEDURE — 84153 ASSAY OF PSA TOTAL: CPT | Performed by: NURSE PRACTITIONER

## 2024-01-30 PROCEDURE — 99214 OFFICE O/P EST MOD 30 MIN: CPT | Performed by: NURSE PRACTITIONER

## 2024-01-30 PROCEDURE — 1170F FXNL STATUS ASSESSED: CPT | Performed by: NURSE PRACTITIONER

## 2024-01-30 PROCEDURE — 3078F DIAST BP <80 MM HG: CPT | Performed by: NURSE PRACTITIONER

## 2024-01-30 PROCEDURE — G0008 ADMIN INFLUENZA VIRUS VAC: HCPCS | Performed by: NURSE PRACTITIONER

## 2024-01-30 PROCEDURE — 83036 HEMOGLOBIN GLYCOSYLATED A1C: CPT | Performed by: NURSE PRACTITIONER

## 2024-01-30 NOTE — LETTER
[facility]  Vaccine Consent Form    Patient Name:  Benja Macedo  Patient :  1940     Vaccine(s) Ordered    Fluzone High-Dose 65+yrs ([id number])        Screening Checklist  The following questions should be completed prior to vaccination. If you answer “yes” to any question, it does not necessarily mean you should not be vaccinated. It just means we may need to clarify or ask more questions. If a question is unclear, please ask your healthcare provider to explain it.    Yes No   Any fever or moderate to severe illness today (mild illness and/or antibiotic treatment are not contraindications)?     Do you have a history of a serious reaction to any previous vaccinations, such as anaphylaxis, encephalopathy within 7 days, Guillain-Sims syndrome within 6 weeks, seizure?     Have you received any live vaccine(s) (e.g MMR, HERMINIA) or any other vaccines in the last month (to ensure duplicate doses aren't given)?     Do you have an anaphylactic allergy to latex (DTaP, DTaP-IPV, Hep A, Hep B, MenB, RV, Td, Tdap), baker’s yeast (Hep B, HPV), polysorbates (RSV, nirsevimab, PCV 20, Rotavirrus, Tdap, Shingrix), or gelatin (HERMINIA, MMR)?     Do you have an anaphylactic allergy to neomycin (Rabies, HERMINIA, MMR, IPV, Hep A), polymyxin B (IPV), or streptomycin (IPV)?      Any cancer, leukemia, AIDS, or other immune system disorder? (HERMINIA, MMR, RV)     Do you have a parent, brother, or sister with an immune system problem (if immune competence of vaccine recipient clinically verified, can proceed)? (MMR, HERMINIA)     Any recent steroid treatments for >2 weeks, chemotherapy, or radiation treatment? (HERMINIA, MMR)     Have you received antibody-containing blood transfusions or IVIG in the past 11 months (recommended interval is dependent on product)? (MMR, HERMINIA)     Have you taken antiviral drugs (acyclovir, famciclovir, valacyclovir for HERMINIA) in the last 24 or 48 hours, respectively?      Are you pregnant or planning to become pregnant  "within 1 month? (HERMINIA, MMR, HPV, IPV, MenB, Abrexvy; For Hep B- refer to Engerix-B; For RSV - Abrysvo is indicated for 32-36 weeks of pregnancy from September to January)     For infants, have you ever been told your child has had intussusception or a medical emergency involving obstruction of the intestine (Rotavirus)? If not for an infant, can skip this question.         *Ordering Physicians/APC should be consulted if \"yes\" is checked by the patient or guardian above.  I have received, read, and understand the Vaccine Information Statement (VIS) for each vaccine ordered.  I have considered my or my child's health status as well as the health status of my close contacts.  I have taken the opportunity to discuss my vaccine questions with my or my child's health care provider.   I have requested that the ordered vaccine(s) be given to me or my child.  I understand the benefits and risks of the vaccines.  I understand that I should remain in the clinic for 15 minutes after receiving the vaccine(s).  _________________________________________________________  Signature of Patient or Parent/Legal Guardian ____________________  Date     "

## 2024-01-30 NOTE — PROGRESS NOTES
I N T E R N A L  M E D I C I N E  CESAR BROWN, APRN      ENCOUNTER DATE:  01/30/2024    Benja Macedo / 83 y.o. / male    MEDICARE ANNUAL WELLNESS VISIT       Chief Complaint: Medicare Wellness-subsequent (Needs order for more therapy ), Weakness - Generalized, Knee Pain, Diabetes, Hypertension, and Hyperlipidemia       Patient's general assessment of his health since a year ago:     - Compared to one year ago, he feels his physical health is about the same without significant change.    - Compared to one year ago, he feels his mental health is slightly better.      HPI for other active medical problems:     Followed by nephrology. Seen 01/04/2024. MIKAELA on kenney of stage 3b chronic kidney disease. Creatinine up to 2.4, baseline 1.7. MIKAELA secondary to hypotension and UTI. Creatinine trended downward to 1.4 in July after stopping lisinopril HCTZ.  Blood pressure controlled at home.  Limiting salt intake, edema better than before.    Hyperlipidemia: controlled with atorvastatin 20 mg last  with triglycerides 173.  No myalgias with medication.     Diabetes mellitus type II: Previously on Metformin but last hemoglobin A1c 5.9.    Hypertension: Controlled on carvedilol 6.25 BID PRN     Patient continues to be managed by orthopedics last seen by Dr. Mendez April 24, 2023 for complaints of left shoulder pain which he has a known large rotator cuff tear and some degenerative changes with management nonoperatively. Severe bilateral knee osteoarthritis, steroid injection in past, multiple falls due to lower extremity weakness.     Found to have a lung nodule on imaging during recent hospitalization, declines follow-up stating given his lung cancer he would not proceed with treatment.     He did not like his last urologist, elevated PSA and potential prostatitis.  He was post to follow-up in September but did not.  No visible hematuria     Declines any further health screening.    Plans to transition to a nursing  "facility within the next year and needs home health assessment.     * The required components of Health Risk Assessment (HRA) that were completed by the patient and/or my staff are contained within this note and in the scanned documents titled \"Health Risk Assessment\" within the media section of the [facility].       HISTORY       Recent Hospitalizations:    Recent hospitalization?: Yes     If YES, location, date, and diagnoses:     Location: [facility]   Date: 07/06/2023  Principle Discharge Dx: MIKAELA  Secondary Dx:       Patient Care Team:    Patient Care Team:  Yo Mckeon APRN as PCP - General (Internal Medicine)  Luz Marina Solano MD as Consulting Physician (Nephrology)      Allergies:  Patient has no known allergies.    Medications:  Current Outpatient Medications on File Prior to Visit   Medication Sig Dispense Refill    acetaminophen (TYLENOL) 500 MG tablet Take 2 tablets by mouth At Night As Needed.      ASPIRIN 81 PO Take 81 mg by mouth Daily.      atorvastatin (LIPITOR) 20 MG tablet TAKE ONE TABLET BY MOUTH DAILY 90 tablet 1    carvedilol (COREG) 6.25 MG tablet TAKE ONE TABLET BY MOUTH TWICE A DAY WITH MEALS 180 tablet 1    coenzyme Q10 100 MG capsule Take 1 capsule by mouth Daily.      cyclobenzaprine (FLEXERIL) 10 MG tablet TAKE ONE TABLET BY MOUTH THREE TIMES A DAY AS NEEDED FOR MUSCLE SPASMS 90 tablet 1    fluticasone (FLONASE) 50 MCG/ACT nasal spray 2 sprays into the nostril(s) as directed by provider Daily As Needed.      Multiple Vitamins-Minerals (MULTIVITAMIN ADULT PO) Take  by mouth Daily.      Omega-3 Fatty Acids (fish oil) 1000 MG capsule capsule Take  by mouth Daily With Breakfast.      vitamin B-12 (CYANOCOBALAMIN) 1000 MCG tablet Take 1 tablet by mouth Daily.      vitamin C (ASCORBIC ACID) 500 MG tablet Take 1 tablet by mouth Daily.       No current facility-administered medications on file prior to visit.        No opioid medication identified on active " medication list. I have reviewed chart for other potential  high risk medication/s and harmful drug interactions in the elderly.          PFSH:     The following portions of the patient's history were reviewed and updated as appropriate: Allergies / Current Medications / Past Medical History / Surgical History / Social History / Family History    Problem List:  Patient Active Problem List   Diagnosis    Benign essential hypertension    Gastroesophageal reflux disease    Hyperglycemia    Hyperlipidemia    Nocturia    Renal insufficiency    Cerebrovascular accident - history of    Type 2 diabetes mellitus    Benign hypertension with chronic kidney disease    Stage 3b chronic kidney disease    Edema of lower extremity    Dyslipidemia    History of cerebrovascular accident    Pain of left shoulder joint on movement    Weakness    UTI (urinary tract infection)    Acute UTI (urinary tract infection)    Nodule of upper lobe of left lung    Abnormal CT of the abdomen    Abnormal CT of the head    Vitamin D deficiency       Past Medical History:  Past Medical History:   Diagnosis Date    Acute UTI (urinary tract infection)     Diabetes mellitus     Hypertension     Renal insufficiency        Past Surgical History:  History reviewed. No pertinent surgical history.    Social History:  Social History     Socioeconomic History    Marital status: Single   Tobacco Use    Smoking status: Former     Packs/day: 1.00     Years: 23.00     Additional pack years: 0.00     Total pack years: 23.00     Types: Cigarettes     Start date:      Quit date:      Years since quittin.1    Smokeless tobacco: Never   Vaping Use    Vaping Use: Never used   Substance and Sexual Activity    Alcohol use: Yes     Comment: occassional     Drug use: Never    Sexual activity: Defer       Family History:  Family History   Problem Relation Age of Onset    Stroke Mother     Heart attack Father     Stroke Brother     Diverticulitis Brother      "Hypertension Brother          PATIENT ASSESSMENT     Vitals:  /68 (BP Location: Right arm, Patient Position: Sitting, Cuff Size: Adult)   Pulse 86   Temp 97.9 °F (36.6 °C) (Temporal)   Ht 177.8 cm (70\")   Wt 74.9 kg (165 lb 3.2 oz)   SpO2 95%   BMI 23.70 kg/m²   BP Readings from Last 3 Encounters:   01/30/24 116/68   08/08/23 128/66   07/10/23 148/79     Wt Readings from Last 3 Encounters:   01/30/24 74.9 kg (165 lb 3.2 oz)   11/07/23 74.2 kg (163 lb 8 oz)   08/08/23 73.9 kg (163 lb)      Body mass index is 23.7 kg/m².    Pain Score    01/30/24 1223   PainSc: 0-No pain         Review of Systems:    Review of Systems  Constitutional chronically ill  Resp neg  CV neg     Physical Exam:    Physical Exam  Constitutional  No distress  Cardiovascular Rate  normal . Rhythm: regular . Heart sounds:  normal  Pulmonary/Chest  Effort normal. Breath sounds:  normal  Musc weakness lower extremities   Psychiatric  Alert. Judgment and thought content normal. Mood normal      Reviewed Data:    Labs:   Lab Results   Component Value Date     07/08/2023    K 3.9 07/08/2023    CALCIUM 9.2 07/08/2023    AST 23 07/07/2023    ALT 20 07/07/2023    BUN 39 (H) 07/08/2023    CREATININE 1.44 (H) 07/08/2023    CREATININE 1.89 (H) 07/07/2023    CREATININE 2.43 (H) 07/06/2023    EGFRIFNONA 40 (L) 08/31/2021    EGFRIFAFRI 48 (L) 08/31/2021       Lab Results   Component Value Date    HGBA1C 5.60 07/07/2023    HGBA1C 6.10 (H) 06/13/2023    HGBA1C 6.1 (H) 12/30/2022    MICROALBUR <3.0 01/03/2023       Lab Results   Component Value Date     06/13/2023     (H) 12/30/2022    LDL 79 05/17/2022    HDL 36 (L) 06/13/2023    TRIG 172 (H) 06/13/2023    CHOLHDLRATIO 4.61 06/13/2023       Lab Results   Component Value Date    TSH 1.160 12/30/2022    FREET4 1.49 12/30/2022          Lab Results   Component Value Date    WBC 12.31 (H) 07/08/2023    HGB 8.6 (L) 07/08/2023    HGB 9.0 (L) 07/07/2023    HGB 9.4 (L) 07/06/2023     " 07/08/2023                   Lab Results   Component Value Date    PSA 29.300 (H) 07/06/2023    PSA 0.8 05/17/2022    PSA 0.644 07/26/2019       Imaging:          Medical Tests:          Screening for Glaucoma:  Previous screening for glaucoma?: No; declines       Hearing Loss Screen:  Finger Rub Hearing Test (right ear): passed  Finger Rub Hearing Test (left ear): passed      Urinary Incontinence Screen:  Episodes of urinary incontinence? : Will need close follow-up.       Depression Screen:      1/30/2024    12:20 PM   PHQ-2/PHQ-9 Depression Screening   Little Interest or Pleasure in Doing Things 0-->not at all   Feeling Down, Depressed or Hopeless 0-->not at all   PHQ-9: Brief Depression Severity Measure Score 0        PHQ-2: 0 (Not depressed)    PHQ-9: 0 (Negative screening for depression)       FUNCTIONAL, FALL RISK, & COGNITIVE SCREENING (Components below):    DATA:        1/30/2024    12:20 PM   Functional & Cognitive Status   Do you have difficulty preparing food and eating? No   Do you have difficulty bathing yourself, getting dressed or grooming yourself? Yes   Do you have difficulty using the toilet? Yes   Do you have difficulty moving around from place to place? Yes   Do you have trouble with steps or getting out of a bed or a chair? Yes   Current Diet Well Balanced Diet   Dental Exam Up to date   Eye Exam Up to date   Exercise (times per week) 0 times per week   Current Exercises Include No Regular Exercise   Do you need help using the phone?  No   Are you deaf or do you have serious difficulty hearing?  No   Do you need help to go to places out of walking distance? Yes   Do you need help shopping? Yes   Do you need help preparing meals?  Yes   Do you need help with housework?  Yes   Do you need help with laundry? Yes   Do you need help taking your medications? No   Do you need help managing money? No   Do you ever drive or ride in a car without wearing a seat belt? No   Have you felt unusual stress,  anger or loneliness in the last month? No   Who do you live with? Sibling   If you need help, do you have trouble finding someone available to you? No   Have you been bothered in the last four weeks by sexual problems? No   Do you have difficulty concentrating, remembering or making decisions? No         A) Assessment of Functional Ability:  (Assessment of ability to perform ADL's (showering/bathing, using toilet, dressing, feeding self, moving self around) and IADL's (use telephone, shop, prepare food, housekeep, do laundry, transport independently, take medications independently, and handle finances)    Degree of functional impairment: SEVERE (based on assessment noted above)      B) Assessment of Fall Risk:  Fall Risk Assessment was completed, and patient is at high risk for falls.       Need for further evaluation of gait, strength, and balance? : Will need close follow-up.     Timed Up and Go (TUG):   (>= 12 seconds indicates high risk for falling)    Observable abnormalities included:  wheelchair         C. Assessment of Cognitive Function:    Mini-Cog Test:     1) Registration (3 objects): No   2) Number of objects recalled: 2   3) Clock Draw: Passed? : Yes       Further evaluation required? : Will need close follow-up.         COUNSELING       A. Identification of Health Risk Factors:    Risk factors include: cardiovascular risk factors      B. Age-Appropriate Screening Schedule:  (Refer to the list below for future screening recommendations based on patient's age, sex and/or medical conditions. Orders for these recommended tests are listed in the plan section. The patient has been provided with a written plan)    Health Maintenance Topics  Health Maintenance   Topic Date Due    ZOSTER VACCINE (1 of 2) Never done    DIABETIC EYE EXAM  02/06/2021    INFLUENZA VACCINE  08/01/2023    COVID-19 Vaccine (4 - 2023-24 season) 09/01/2023    URINE MICROALBUMIN  02/17/2024 (Originally 1/3/2024)    HEMOGLOBIN A1C   02/21/2024 (Originally 1/7/2024)    LIPID PANEL  06/13/2024    ANNUAL WELLNESS VISIT  01/30/2025    TDAP/TD VACCINES (2 - Td or Tdap) 07/26/2029    COLORECTAL CANCER SCREENING  07/26/2029    Pneumococcal Vaccine 65+  Completed       Health Maintenance Topics Due or Over-Due  Health Maintenance Due   Topic Date Due    ZOSTER VACCINE (1 of 2) Never done    DIABETIC EYE EXAM  02/06/2021    INFLUENZA VACCINE  08/01/2023    COVID-19 Vaccine (4 - 2023-24 season) 09/01/2023       C. Advanced Care Planning:    Advance Care Planning   ACP discussion was held with the patient during this visit. Patient has an advance directive in EMR which is still valid.        D. Patient Self-Management and Personalized Health Advice:    He has been provided with personalized counseling/information (including brochures/handouts) about:     -- optimizing diet/nutrition plans, improving exercise / conditioning, and reducing risk for cardiovascular disease (heart, stroke, vascular)      He has been recommended for the following preventative services which has been performed today, will be ordered today or ordered/performed on upcoming follow-up visit:     E. Miscellaneous Items:    -Aspirin use counseling: taking     -Discussed BMI with him. The BMI is in the acceptable range    -Reviewed use of high risk medication in the elderly: YES    -Reviewed for potential of harmful drug interactions in the elderly: YES      WRAP UP       Assessment & Plan:    1) MEDICARE ANNUAL WELLNESS VISIT    2) OTHER MEDICAL CONDITIONS ADDRESSED TODAY:            Problem List Items Addressed This Visit       Benign essential hypertension    Relevant Medications    carvedilol (COREG) 6.25 MG tablet    Other Relevant Orders    Urinalysis With Microscopic If Indicated (No Culture) - Urine, Clean Catch    Hyperlipidemia    Relevant Medications    atorvastatin (LIPITOR) 20 MG tablet    Other Relevant Orders    Lipid Panel With / Chol / HDL Ratio    Type 2 diabetes  mellitus    Relevant Orders    Hemoglobin A1c    Weakness    Relevant Orders    Ambulatory Referral to Home Health (Completed)     Other Visit Diagnoses       Medicare annual wellness visit, subsequent    -  Primary    Need for vaccination        Relevant Orders    Fluzone High-Dose 65+yrs ([id number])    Skin lesion        Relevant Orders    Ambulatory Referral to Dermatology (Completed)    Osteoarthritis of both knees, unspecified osteoarthritis type        Relevant Orders    Ambulatory Referral to Home Health (Completed)    Elevated PSA        Relevant Orders    PSA DIAGNOSTIC    Need for COVID-19 vaccine        Relevant Medications    COVID-19 mRNA Vac-Cydney,Pfizer, 30 MCG/0.3ML suspension                      Orders Placed This Encounter   Procedures    Fluzone High-Dose 65+yrs ([id number])    PSA DIAGNOSTIC    Urinalysis With Microscopic If Indicated (No Culture) - Urine, Clean Catch    Hemoglobin A1c    Lipid Panel With / Chol / HDL Ratio    Ambulatory Referral to Dermatology    Ambulatory Referral to Home Health       Discussion / Summary:  We will go ahead and refer him to home health for evaluation weakness and knee pain, will need assessment prior to nursing facility transfer  Will check his PSA since he is no longer following his urologist.  If elevated will need to send back to a different care team  Continue current management for chronic medical for diabetes, hyperlipidemia and hypertension follow-up in 6 months for chronic medical, 1 year annual wellness   Flu vaccine administered today  COVID[facility] at [facility]    Medications as of TODAY:              Current Outpatient Medications   Medication Sig Dispense Refill    acetaminophen (TYLENOL) 500 MG tablet Take 2 tablets by mouth At Night As Needed.      ASPIRIN 81 PO Take 81 mg by mouth Daily.      atorvastatin (LIPITOR) 20 MG tablet TAKE ONE TABLET BY MOUTH DAILY 90 tablet 1    carvedilol (COREG) 6.25 MG tablet TAKE ONE TABLET BY MOUTH TWICE A  DAY WITH MEALS 180 tablet 1    coenzyme Q10 100 MG capsule Take 1 capsule by mouth Daily.      cyclobenzaprine (FLEXERIL) 10 MG tablet TAKE ONE TABLET BY MOUTH THREE TIMES A DAY AS NEEDED FOR MUSCLE SPASMS 90 tablet 1    fluticasone (FLONASE) 50 MCG/ACT nasal spray 2 sprays into the nostril(s) as directed by provider Daily As Needed.      Multiple Vitamins-Minerals (MULTIVITAMIN ADULT PO) Take  by mouth Daily.      Omega-3 Fatty Acids (fish oil) 1000 MG capsule capsule Take  by mouth Daily With Breakfast.      vitamin B-12 (CYANOCOBALAMIN) 1000 MCG tablet Take 1 tablet by mouth Daily.      vitamin C (ASCORBIC ACID) 500 MG tablet Take 1 tablet by mouth Daily.      COVID-19 mRNA Vac-Cydney,Pfizer, 30 MCG/0.3ML suspension Inject 0.3 mL into the appropriate muscle as directed by prescriber 1 (One) Time for 1 dose. 0.3 mL 0     No current facility-administered medications for this visit.     FOLLOW-UP:            Return in about 6 months (around 7/30/2024) for Next scheduled follow up; 1 year annual wellness .                 Future Appointments   Date Time Provider Department Center   2/13/2024  2:00 PM Yenni Mendez MD [facility] [id number] [facility]   7/30/2024  9:00 AM Yo Mckeon APRN [facility]       After Visit Summary (AVS) including the Personalized Prevention  Plan Services (PPPS) was either printed and given to the patient at check-out today and/or sent to [facility] for review.     *Dragon dictation used for documentation.

## 2024-03-05 ENCOUNTER — OFFICE VISIT (OUTPATIENT)
Dept: ORTHOPEDIC SURGERY | Facility: CLINIC | Age: 84
End: 2024-03-05
Payer: MEDICARE

## 2024-03-05 VITALS — BODY MASS INDEX: 23.62 KG/M2 | WEIGHT: 165 LBS | HEIGHT: 70 IN | TEMPERATURE: 98.2 F

## 2024-03-05 DIAGNOSIS — M12.812 ROTATOR CUFF ARTHROPATHY, LEFT: Primary | ICD-10-CM

## 2024-03-05 RX ORDER — KETOCONAZOLE 20 MG/ML
SHAMPOO TOPICAL
COMMUNITY
Start: 2024-02-29

## 2024-03-05 RX ADMIN — LIDOCAINE HYDROCHLORIDE 2 ML: 20 INJECTION, SOLUTION EPIDURAL; INFILTRATION; INTRACAUDAL; PERINEURAL at 15:51

## 2024-03-05 RX ADMIN — METHYLPREDNISOLONE ACETATE 80 MG: 80 INJECTION, SUSPENSION INTRA-ARTICULAR; INTRALESIONAL; INTRAMUSCULAR; SOFT TISSUE at 15:51

## 2024-03-05 NOTE — PROGRESS NOTES
Patient: Benja Macedo  YOB: 1940  Date of Service: 3/5/2024    Chief Complaints: Left shoulder pain    Subjective:    History of Present Illness: Pt is seen in the office today with complaints of left shoulder pain he has known rotator cuff arthropathy gets intermittent injections and does well with those he is not interested in surgical      Allergies: No Known Allergies    Medications:   Home Medications:  Current Outpatient Medications on File Prior to Visit   Medication Sig    acetaminophen (TYLENOL) 500 MG tablet Take 2 tablets by mouth At Night As Needed.    ASPIRIN 81 PO Take 81 mg by mouth Daily.    atorvastatin (LIPITOR) 20 MG tablet TAKE ONE TABLET BY MOUTH DAILY    carvedilol (COREG) 6.25 MG tablet TAKE ONE TABLET BY MOUTH TWICE A DAY WITH MEALS    coenzyme Q10 100 MG capsule Take 1 capsule by mouth Daily.    cyclobenzaprine (FLEXERIL) 10 MG tablet TAKE ONE TABLET BY MOUTH THREE TIMES A DAY AS NEEDED FOR MUSCLE SPASMS    fluticasone (FLONASE) 50 MCG/ACT nasal spray 2 sprays into the nostril(s) as directed by provider Daily As Needed.    Multiple Vitamins-Minerals (MULTIVITAMIN ADULT PO) Take  by mouth Daily.    Omega-3 Fatty Acids (fish oil) 1000 MG capsule capsule Take  by mouth Daily With Breakfast.    vitamin B-12 (CYANOCOBALAMIN) 1000 MCG tablet Take 1 tablet by mouth Daily.    vitamin C (ASCORBIC ACID) 500 MG tablet Take 1 tablet by mouth Daily.     No current facility-administered medications on file prior to visit.     Current Medications:  Scheduled Meds:  Continuous Infusions:No current facility-administered medications for this visit.    PRN Meds:.    I have reviewed the patient's medical history in detail and updated the computerized patient record.  Review and summarization of old records include:    Past Medical History:   Diagnosis Date    Acute UTI (urinary tract infection)     Diabetes mellitus     Hypertension     Renal insufficiency       No past surgical history  on file.     Social History     Occupational History    Not on file   Tobacco Use    Smoking status: Former     Current packs/day: 0.00     Average packs/day: 1 pack/day for 23.0 years (23.0 ttl pk-yrs)     Types: Cigarettes     Start date:      Quit date:      Years since quittin.2    Smokeless tobacco: Never   Vaping Use    Vaping status: Never Used   Substance and Sexual Activity    Alcohol use: Yes     Comment: occassional     Drug use: Never    Sexual activity: Defer      Social History     Social History Narrative    Not on file        Family History   Problem Relation Age of Onset    Stroke Mother     Heart attack Father     Stroke Brother     Diverticulitis Brother     Hypertension Brother        ROS: 14 point review of systems was performed and was negative except for documented findings in HPI and today's encounter.     Allergies: No Known Allergies  Constitutional:  Denies fever, shaking or chills   Eyes:  Denies change in visual acuity   HENT:  Denies nasal congestion or sore throat   Respiratory:  Denies cough or shortness of breath   Cardiovascular:  Denies chest pain or severe LE edema   GI:  Denies abdominal pain, nausea, vomiting, bloody stools or diarrhea   Musculoskeletal:  Numbness, tingling, or loss of motor function only as noted above in history of present illness.  : Denies painful urination or hematuria  Integument:  Denies rash, lesion or ulceration   Neurologic:  Denies headache or focal weakness  Endocrine:  Denies lymphadenopathy  Psych:  Denies confusion or change in mental status   Hem:  Denies active bleeding      Physical Exam: 83 y.o. male  Wt Readings from Last 3 Encounters:   24 74.9 kg (165 lb 3.2 oz)   23 74.2 kg (163 lb 8 oz)   23 73.9 kg (163 lb)       There is no height or weight on file to calculate BMI.    There were no vitals filed for this visit.  Vital signs reviewed.   General Appearance:    Alert, cooperative, in no acute distress                     Ortho exam      Exam is unchanged           Assessment: Left shoulder rotator cuff arthropathy    Plan: Injections he is not interested in surgical in the future if it is just injections he wants I will have him see Diego  Follow up as indicated.  Ice, elevate, and rest as needed.  Discussed conservative measures of pain control including ice, bracing.  Also talked about the importance of strengthening  Yenni Mendez M.D.        Patient: Benja Macedo  YOB: 1940  Date of Service: 3/5/2024    Chief Complaints:     Subjective:    History of Present Illness: Pt is seen in the office today with complaints of         Allergies: No Known Allergies    Medications:   Home Medications:  Current Outpatient Medications on File Prior to Visit   Medication Sig    acetaminophen (TYLENOL) 500 MG tablet Take 2 tablets by mouth At Night As Needed.    ASPIRIN 81 PO Take 81 mg by mouth Daily.    atorvastatin (LIPITOR) 20 MG tablet TAKE ONE TABLET BY MOUTH DAILY    carvedilol (COREG) 6.25 MG tablet TAKE ONE TABLET BY MOUTH TWICE A DAY WITH MEALS    coenzyme Q10 100 MG capsule Take 1 capsule by mouth Daily.    cyclobenzaprine (FLEXERIL) 10 MG tablet TAKE ONE TABLET BY MOUTH THREE TIMES A DAY AS NEEDED FOR MUSCLE SPASMS    fluticasone (FLONASE) 50 MCG/ACT nasal spray 2 sprays into the nostril(s) as directed by provider Daily As Needed.    Multiple Vitamins-Minerals (MULTIVITAMIN ADULT PO) Take  by mouth Daily.    Omega-3 Fatty Acids (fish oil) 1000 MG capsule capsule Take  by mouth Daily With Breakfast.    vitamin B-12 (CYANOCOBALAMIN) 1000 MCG tablet Take 1 tablet by mouth Daily.    vitamin C (ASCORBIC ACID) 500 MG tablet Take 1 tablet by mouth Daily.     No current facility-administered medications on file prior to visit.     Current Medications:  Scheduled Meds:  Continuous Infusions:No current facility-administered medications for this visit.    PRN Meds:.    I have reviewed the patient's medical  history in detail and updated the computerized patient record.  Review and summarization of old records include:    Past Medical History:   Diagnosis Date    Acute UTI (urinary tract infection)     Diabetes mellitus     Hypertension     Renal insufficiency       No past surgical history on file.     Social History     Occupational History    Not on file   Tobacco Use    Smoking status: Former     Current packs/day: 0.00     Average packs/day: 1 pack/day for 23.0 years (23.0 ttl pk-yrs)     Types: Cigarettes     Start date:      Quit date:      Years since quittin.2    Smokeless tobacco: Never   Vaping Use    Vaping status: Never Used   Substance and Sexual Activity    Alcohol use: Yes     Comment: occassional     Drug use: Never    Sexual activity: Defer      Social History     Social History Narrative    Not on file        Family History   Problem Relation Age of Onset    Stroke Mother     Heart attack Father     Stroke Brother     Diverticulitis Brother     Hypertension Brother        ROS: 14 point review of systems was performed and was negative except for documented findings in HPI and today's encounter.     Allergies: No Known Allergies  Constitutional:  Denies fever, shaking or chills   Eyes:  Denies change in visual acuity   HENT:  Denies nasal congestion or sore throat   Respiratory:  Denies cough or shortness of breath   Cardiovascular:  Denies chest pain or severe LE edema   GI:  Denies abdominal pain, nausea, vomiting, bloody stools or diarrhea   Musculoskeletal:  Numbness, tingling, or loss of motor function only as noted above in history of present illness.  : Denies painful urination or hematuria  Integument:  Denies rash, lesion or ulceration   Neurologic:  Denies headache or focal weakness  Endocrine:  Denies lymphadenopathy  Psych:  Denies confusion or change in mental status   Hem:  Denies active bleeding      Physical Exam: 83 y.o. male  Wt Readings from Last 3 Encounters:    01/30/24 74.9 kg (165 lb 3.2 oz)   11/07/23 74.2 kg (163 lb 8 oz)   08/08/23 73.9 kg (163 lb)       There is no height or weight on file to calculate BMI.    There were no vitals filed for this visit.  Vital signs reviewed.   General Appearance:    Alert, cooperative, in no acute distress                    Ortho exam                 Assessment:     Plan:   Follow up as indicated.  Ice, elevate, and rest as needed.  Discussed conservative measures of pain control including ice, bracing.  Also talked about the importance of strengthening and maintaining ideal body weight    Yenni Mendez M.D.    Large Joint Arthrocentesis: L subacromial bursa  Date/Time: 3/5/2024 3:51 PM  Consent given by: patient  Site marked: site marked  Timeout: Immediately prior to procedure a time out was called to verify the correct patient, procedure, equipment, support staff and site/side marked as required   Supporting Documentation  Indications: pain   Procedure Details  Location: shoulder - L subacromial bursa  Preparation: Patient was prepped and draped in the usual sterile fashion  Needle gauge: 21G.  Approach: posterior  Medications administered: 80 mg methylPREDNISolone acetate 80 MG/ML; 2 mL lidocaine PF 2% 2 %  Patient tolerance: patient tolerated the procedure well with no immediate complications

## 2024-03-06 RX ORDER — LIDOCAINE HYDROCHLORIDE 20 MG/ML
2 INJECTION, SOLUTION EPIDURAL; INFILTRATION; INTRACAUDAL; PERINEURAL
Status: COMPLETED | OUTPATIENT
Start: 2024-03-05 | End: 2024-03-05

## 2024-03-06 RX ORDER — METHYLPREDNISOLONE ACETATE 80 MG/ML
80 INJECTION, SUSPENSION INTRA-ARTICULAR; INTRALESIONAL; INTRAMUSCULAR; SOFT TISSUE
Status: COMPLETED | OUTPATIENT
Start: 2024-03-05 | End: 2024-03-05

## 2024-04-18 DIAGNOSIS — E78.2 MIXED HYPERLIPIDEMIA: ICD-10-CM

## 2024-04-18 RX ORDER — ATORVASTATIN CALCIUM 20 MG/1
20 TABLET, FILM COATED ORAL DAILY
Qty: 90 TABLET | Refills: 1 | Status: SHIPPED | OUTPATIENT
Start: 2024-04-18

## 2024-04-21 RX ORDER — CARVEDILOL 6.25 MG/1
6.25 TABLET ORAL 2 TIMES DAILY WITH MEALS
Qty: 180 TABLET | Refills: 1 | Status: SHIPPED | OUTPATIENT
Start: 2024-04-21

## 2024-07-30 ENCOUNTER — OFFICE VISIT (OUTPATIENT)
Dept: INTERNAL MEDICINE | Age: 84
End: 2024-07-30
Payer: MEDICARE

## 2024-07-30 VITALS
BODY MASS INDEX: 23.68 KG/M2 | SYSTOLIC BLOOD PRESSURE: 124 MMHG | HEIGHT: 70 IN | HEART RATE: 62 BPM | OXYGEN SATURATION: 98 % | DIASTOLIC BLOOD PRESSURE: 68 MMHG

## 2024-07-30 DIAGNOSIS — E78.2 MIXED HYPERLIPIDEMIA: ICD-10-CM

## 2024-07-30 DIAGNOSIS — E11.9 TYPE 2 DIABETES MELLITUS WITHOUT COMPLICATION, WITHOUT LONG-TERM CURRENT USE OF INSULIN: ICD-10-CM

## 2024-07-30 DIAGNOSIS — Z29.11 NEED FOR RSV IMMUNIZATION: Primary | ICD-10-CM

## 2024-07-30 DIAGNOSIS — I10 BENIGN ESSENTIAL HYPERTENSION: ICD-10-CM

## 2024-07-30 LAB
ALBUMIN SERPL-MCNC: 4.5 G/DL (ref 3.5–5.2)
ALBUMIN/GLOB SERPL: 2 G/DL
ALP SERPL-CCNC: 88 U/L (ref 39–117)
ALT SERPL-CCNC: 20 U/L (ref 1–41)
AST SERPL-CCNC: 14 U/L (ref 1–40)
BILIRUB SERPL-MCNC: 0.3 MG/DL (ref 0–1.2)
BUN SERPL-MCNC: 29 MG/DL (ref 8–23)
BUN/CREAT SERPL: 16.6 (ref 7–25)
CALCIUM SERPL-MCNC: 10 MG/DL (ref 8.6–10.5)
CHLORIDE SERPL-SCNC: 103 MMOL/L (ref 98–107)
CHOLEST SERPL-MCNC: 143 MG/DL (ref 0–200)
CHOLEST/HDLC SERPL: 3.67 {RATIO}
CO2 SERPL-SCNC: 28.9 MMOL/L (ref 22–29)
CREAT SERPL-MCNC: 1.75 MG/DL (ref 0.76–1.27)
EGFRCR SERPLBLD CKD-EPI 2021: 38.6 ML/MIN/1.73
GLOBULIN SER CALC-MCNC: 2.3 GM/DL
GLUCOSE SERPL-MCNC: 92 MG/DL (ref 65–99)
HBA1C MFR BLD: 6 % (ref 4.8–5.6)
HDLC SERPL-MCNC: 39 MG/DL (ref 40–60)
LDLC SERPL CALC-MCNC: 86 MG/DL (ref 0–100)
POTASSIUM SERPL-SCNC: 4.8 MMOL/L (ref 3.5–5.2)
PROT SERPL-MCNC: 6.8 G/DL (ref 6–8.5)
SODIUM SERPL-SCNC: 144 MMOL/L (ref 136–145)
T4 FREE SERPL-MCNC: 1.43 NG/DL (ref 0.92–1.68)
TRIGL SERPL-MCNC: 95 MG/DL (ref 0–150)
TSH SERPL DL<=0.005 MIU/L-ACNC: 1.06 UIU/ML (ref 0.27–4.2)
VLDLC SERPL CALC-MCNC: 18 MG/DL (ref 5–40)

## 2024-07-30 PROCEDURE — 3074F SYST BP LT 130 MM HG: CPT | Performed by: NURSE PRACTITIONER

## 2024-07-30 PROCEDURE — 3078F DIAST BP <80 MM HG: CPT | Performed by: NURSE PRACTITIONER

## 2024-07-30 PROCEDURE — G2211 COMPLEX E/M VISIT ADD ON: HCPCS | Performed by: NURSE PRACTITIONER

## 2024-07-30 PROCEDURE — 1125F AMNT PAIN NOTED PAIN PRSNT: CPT | Performed by: NURSE PRACTITIONER

## 2024-07-30 PROCEDURE — 99214 OFFICE O/P EST MOD 30 MIN: CPT | Performed by: NURSE PRACTITIONER

## 2024-07-30 RX ORDER — FUROSEMIDE 20 MG/1
1 TABLET ORAL DAILY
COMMUNITY
Start: 2024-07-10

## 2024-07-30 NOTE — PROGRESS NOTES
I N T E R N A L  M E D I C I N E  [facility]EL[facility]      ENCOUNTER DATE:  07/30/2024    Benja Macedo / 83 y.o. / male      CHIEF COMPLAINT / REASON FOR OFFICE VISIT     Diabetes, Irregular BP, and Leg Swelling (Bilat swelling - improving with lasix - discussed with nephrology)      ASSESSMENT & PLAN     Problem List Items Addressed This Visit       Benign essential hypertension    Relevant Medications    carvedilol (COREG) 6.25 MG tablet    furosemide (LASIX) 20 MG tablet    Other Relevant Orders    Comprehensive Metabolic Panel    TSH+Free T4    Hyperlipidemia    Relevant Medications    atorvastatin (LIPITOR) 20 MG tablet    Other Relevant Orders    Lipid Panel With / Chol / HDL Ratio    Type 2 diabetes mellitus    Relevant Orders    Hemoglobin A1c     Other Visit Diagnoses       Need for RSV immunization    -  Primary    Relevant Medications    RSVPreF3 Vac Recomb Adjuvanted (AREXVY) 120 MCG/0.5ML reconstituted suspension injection          Orders Placed This Encounter   Procedures    Comprehensive Metabolic Panel    Lipid Panel With / Chol / HDL Ratio    Hemoglobin A1c    TSH+Free T4     New Medications Ordered This Visit   Medications    RSVPreF3 Vac Recomb Adjuvanted (AREXVY) 120 MCG/0.5ML reconstituted suspension injection     Sig: Inject 0.5 mL into the appropriate muscle as directed by prescriber 1 (One) Time for 1 dose.     Dispense:  0.5 mL     Refill:  0       SUMMARY/DISCUSSION  Blood pressure variable at home, anywhere from 120s to 160s, has been consistently good here.  Recommend bring blood pressure cuff to next office visit or nephrology appointment  Will recheck kidney function and electrolytes with initiation of Lasix by nephrology, has not had his kidney function or electrolytes rechecked.  After this is checked we will review for need for potassium supplementation and safety of continuing Lasix.  Will forward to his nephrology team.  RSV vaccine at the pharmacy downstairs.    Next  "Appointment with me: Visit date not found    Return in about 6 months (around 1/30/2025) for Medicare Wellness.      VITAL SIGNS     Visit Vitals  /68 (BP Location: Left arm, Patient Position: Sitting, Cuff Size: Adult)   Pulse 62   Ht 177.8 cm (70\")   SpO2 98%   BMI 23.68 kg/m²     Wt Readings from Last 3 Encounters:   03/05/24 74.8 kg (165 lb)   01/30/24 74.9 kg (165 lb 3.2 oz)   11/07/23 74.2 kg (163 lb 8 oz)     Body mass index is 23.68 kg/m².    MEDICATIONS AT THE TIME OF OFFICE VISIT     Current Outpatient Medications on File Prior to Visit   Medication Sig    acetaminophen (TYLENOL) 500 MG tablet Take 2 tablets by mouth At Night As Needed.    ASPIRIN 81 PO Take 81 mg by mouth Daily.    atorvastatin (LIPITOR) 20 MG tablet TAKE 1 TABLET BY MOUTH DAILY    carvedilol (COREG) 6.25 MG tablet TAKE 1 TABLET BY MOUTH TWICE A DAY WITH A MEAL    coenzyme Q10 100 MG capsule Take 1 capsule by mouth Daily.    fluticasone (FLONASE) 50 MCG/ACT nasal spray 2 sprays into the nostril(s) as directed by provider Daily As Needed.    furosemide (LASIX) 20 MG tablet Take 1 tablet by mouth Daily.    Multiple Vitamins-Minerals (MULTIVITAMIN ADULT PO) Take  by mouth Daily.    Omega-3 Fatty Acids (fish oil) 1000 MG capsule capsule Take  by mouth Daily With Breakfast.    vitamin B-12 (CYANOCOBALAMIN) 1000 MCG tablet Take 1 tablet by mouth Daily.    vitamin C (ASCORBIC ACID) 500 MG tablet Take 1 tablet by mouth Daily.    [DISCONTINUED] cyclobenzaprine (FLEXERIL) 10 MG tablet TAKE ONE TABLET BY MOUTH THREE TIMES A DAY AS NEEDED FOR MUSCLE SPASMS (Patient not taking: Reported on 7/30/2024)    [DISCONTINUED] ketoconazole (NIZORAL) 2 % shampoo  (Patient not taking: Reported on 7/30/2024)     No current facility-administered medications on file prior to visit.      HISTORY OF PRESENT ILLNESS     Hyperlipidemia: controlled with atorvastatin 20 mg last LDL 80 with triglycerides 127.  No myalgias with medication.     Diabetes mellitus type " II: Previously on Metformin but last hemoglobin A1c 6.0.  Controlled with diet.    Hypertension: Controlled on carvedilol 6.25 BID PRN.    Found to have a lung nodule on imaging during recent hospitalization, declines follow-up stating given his lung cancer he would not proceed with treatment.      He did not like his last urologist, elevated PSA and potential prostatitis.  He was post to follow-up in September but did not.  No visible hematuria.  Recheck PSA in primary care office January 30 and this is normalized to 1.970.     Declines any further health screening.     Plans to transition to a nursing facility within the next year and needs home health assessment.    Seen by Dr. Leggett nephrology July 10, 2024.  July 2, 2024 creatinine of 1.5, GFR 45, stable kidney function avoiding NSAIDs.  Hypertension well-controlled at home 120s over 60s.  Edema of lower extremity 1+ bilateral lower extremity he was started on Lasix with recommendation of BMP in 1 week, this does not look to be completed.  Following every 3 months.  Recommend limiting salt intake to less than 2 g per 24 hours.    Seen by Dr. Yenni Mendez March 5 2024 for rotator cuff arthropathy left-sided.  Methylprednisone injection given that day.  Following up as needed.    REVIEW OF SYSTEMS     Constitutional neg except per HPI   Resp neg  CV neg   Musc lower extremity edema    PHYSICAL EXAMINATION     Physical Exam  Constitutional  No distress  Cardiovascular Rate  normal . Rhythm: regular . Heart sounds:  normal  Pulmonary/Chest  Effort normal. Breath sounds:  normal  Musc +1 bilateral lower extremity edema  Psychiatric  Alert. Judgment and thought content normal. Mood normal      REVIEWED DATA     Labs:   Lab Results   Component Value Date    GLUCOSE 103 (H) 07/08/2023    BUN 39 (H) 07/08/2023    CREATININE 1.44 (H) 07/08/2023    EGFRRESULT 39.4 (L) 06/22/2023    EGFR 49.1 (L) 07/08/2023    BCR 27.1 (H) 07/08/2023    K 3.9 07/08/2023    CO2 21.4 (L)  07/08/2023    CALCIUM 9.2 07/08/2023    PROTENTOTREF 7.0 06/13/2023    ALBUMIN 2.7 (L) 07/07/2023    BILITOT 0.2 07/07/2023    AST 23 07/07/2023    ALT 20 07/07/2023     Lab Results   Component Value Date    WBC 12.31 (H) 07/08/2023    HGB 8.6 (L) 07/08/2023    HCT 25.6 (L) 07/08/2023    MCV 97.3 (H) 07/08/2023     07/08/2023     Lab Results   Component Value Date    CHOL 145 01/30/2024    CHLPL 166 06/13/2023    TRIG 127 01/30/2024    HDL 42 01/30/2024    LDL 80 01/30/2024      Lab Results   Component Value Date    TSH 1.160 12/30/2022     Brief Urine Lab Results       None          Lab Results   Component Value Date    HGBA1C 6.00 (H) 01/30/2024       Imaging:           Medical Tests:           Summary of old records / correspondence / consultant report:           Request outside records:

## 2024-10-26 DIAGNOSIS — E78.2 MIXED HYPERLIPIDEMIA: ICD-10-CM

## 2024-10-27 RX ORDER — CARVEDILOL 6.25 MG/1
6.25 TABLET ORAL 2 TIMES DAILY WITH MEALS
Qty: 180 TABLET | Refills: 1 | Status: SHIPPED | OUTPATIENT
Start: 2024-10-27

## 2024-10-27 RX ORDER — ATORVASTATIN CALCIUM 20 MG/1
20 TABLET, FILM COATED ORAL DAILY
Qty: 90 TABLET | Refills: 1 | Status: SHIPPED | OUTPATIENT
Start: 2024-10-27

## 2024-11-14 ENCOUNTER — TELEPHONE (OUTPATIENT)
Dept: INTERNAL MEDICINE | Age: 84
End: 2024-11-14

## 2024-11-14 NOTE — TELEPHONE ENCOUNTER
Caller: Sendy Macedo    Relationship: Emergency Contact    Best call back number: [phone]    What medication are you requesting: MUSCLE RELAXER (FLEXERIL)    What are your current symptoms: MUSCLES IN SPINE HAVE KNOTTED UP. HAVE TRIED BIOFREEZE, AND A MASSAGER. NOTHING IS HELPING    How long have you been experiencing symptoms: OVER THE WEEKEND    Have you had these symptoms before:    [x] Yes  [] No    Have you been treated for these symptoms before:   [x] Yes  [] No    If a prescription is needed, what is your preferred pharmacy and phone number:    [id number] - [place], [place] - [address] [facility] [address] [facility] ([facility] - [phone] [facility] [phone]  [phone]       Additional notes:    PLEASE CALL WITH ANY QUESTIONS.

## 2025-02-13 ENCOUNTER — OFFICE VISIT (OUTPATIENT)
Dept: INTERNAL MEDICINE | Age: 85
End: 2025-02-13
Payer: MEDICARE

## 2025-02-13 VITALS
BODY MASS INDEX: 25.05 KG/M2 | SYSTOLIC BLOOD PRESSURE: 138 MMHG | OXYGEN SATURATION: 98 % | TEMPERATURE: 97.2 F | HEART RATE: 61 BPM | WEIGHT: 175 LBS | HEIGHT: 70 IN | DIASTOLIC BLOOD PRESSURE: 76 MMHG

## 2025-02-13 DIAGNOSIS — Z00.00 MEDICARE ANNUAL WELLNESS VISIT, SUBSEQUENT: Primary | ICD-10-CM

## 2025-02-13 DIAGNOSIS — E11.9 TYPE 2 DIABETES MELLITUS WITHOUT COMPLICATION, WITHOUT LONG-TERM CURRENT USE OF INSULIN: ICD-10-CM

## 2025-02-13 DIAGNOSIS — I12.9 BENIGN HYPERTENSION WITH CHRONIC KIDNEY DISEASE: ICD-10-CM

## 2025-02-13 DIAGNOSIS — E78.2 MIXED HYPERLIPIDEMIA: ICD-10-CM

## 2025-02-13 LAB
ALBUMIN SERPL-MCNC: 4.3 G/DL (ref 3.5–5.2)
ALBUMIN/GLOB SERPL: 1.5 G/DL
ALP SERPL-CCNC: 85 U/L (ref 39–117)
ALT SERPL-CCNC: 23 U/L (ref 1–41)
AST SERPL-CCNC: 16 U/L (ref 1–40)
BILIRUB SERPL-MCNC: 0.5 MG/DL (ref 0–1.2)
BUN SERPL-MCNC: 24 MG/DL (ref 8–23)
BUN/CREAT SERPL: 12.4 (ref 7–25)
CALCIUM SERPL-MCNC: 10 MG/DL (ref 8.6–10.5)
CHLORIDE SERPL-SCNC: 102 MMOL/L (ref 98–107)
CHOLEST SERPL-MCNC: 146 MG/DL (ref 0–200)
CHOLEST/HDLC SERPL: 4.42 {RATIO}
CO2 SERPL-SCNC: 25.2 MMOL/L (ref 22–29)
CREAT SERPL-MCNC: 1.94 MG/DL (ref 0.76–1.27)
EGFRCR SERPLBLD CKD-EPI 2021: 33.9 ML/MIN/1.73
GLOBULIN SER CALC-MCNC: 2.8 GM/DL
GLUCOSE SERPL-MCNC: 88 MG/DL (ref 65–99)
HBA1C MFR BLD: 6.3 % (ref 4.8–5.6)
HDLC SERPL-MCNC: 33 MG/DL (ref 40–60)
LDLC SERPL CALC-MCNC: 85 MG/DL (ref 0–100)
POTASSIUM SERPL-SCNC: 4.4 MMOL/L (ref 3.5–5.2)
PROT SERPL-MCNC: 7.1 G/DL (ref 6–8.5)
SODIUM SERPL-SCNC: 139 MMOL/L (ref 136–145)
T4 FREE SERPL-MCNC: 1.5 NG/DL (ref 0.92–1.68)
TRIGL SERPL-MCNC: 162 MG/DL (ref 0–150)
TSH SERPL DL<=0.005 MIU/L-ACNC: 1.62 UIU/ML (ref 0.27–4.2)
VLDLC SERPL CALC-MCNC: 28 MG/DL (ref 5–40)

## 2025-02-13 PROCEDURE — G0439 PPPS, SUBSEQ VISIT: HCPCS | Performed by: NURSE PRACTITIONER

## 2025-02-13 PROCEDURE — 1126F AMNT PAIN NOTED NONE PRSNT: CPT | Performed by: NURSE PRACTITIONER

## 2025-02-13 PROCEDURE — G2211 COMPLEX E/M VISIT ADD ON: HCPCS | Performed by: NURSE PRACTITIONER

## 2025-02-13 PROCEDURE — 1170F FXNL STATUS ASSESSED: CPT | Performed by: NURSE PRACTITIONER

## 2025-02-13 PROCEDURE — 3075F SYST BP GE 130 - 139MM HG: CPT | Performed by: NURSE PRACTITIONER

## 2025-02-13 PROCEDURE — 3078F DIAST BP <80 MM HG: CPT | Performed by: NURSE PRACTITIONER

## 2025-02-13 PROCEDURE — 99214 OFFICE O/P EST MOD 30 MIN: CPT | Performed by: NURSE PRACTITIONER

## 2025-02-13 NOTE — PROGRESS NOTES
S K Y L E R    F R Y E ,   D N P ,  A P R N                  I  N  T  E  R  N  A  L    M  E  D  I  C  I  N  E         ENCOUNTER DATE:  02/13/2025    Benja Macedo / 84 y.o. / male    MEDICARE ANNUAL WELLNESS VISIT       CHIEF COMPLAINT / REASON FOR OFFICE VISIT     Type 2 diabetes, Leg Swelling, Benign essential hypertension, Hyperlipidemia, and Medicare Wellness-subsequent    Patient's general assessment of his health since a year ago:     - Compared to one year ago, he feels his physical health is:   STABLE/SAME    - Compared to one year ago, he feels his mental health is:  STABLE/SAME    Recent Hospitalization (within past 365 days) (NO unless indicated)  No    Patient Care Team:    Patient Care Team:  Yo Mckeon, DNP, APRN as PCP - General (Internal Medicine)  Luz Marina Solano MD as Consulting Physician (Nephrology)    Allergies:  Patient has no known allergies.    Medications:  Current Outpatient Medications on File Prior to Visit   Medication Sig Dispense Refill    acetaminophen (TYLENOL) 500 MG tablet Take 2 tablets by mouth At Night As Needed.      ASPIRIN 81 PO Take 81 mg by mouth Daily.      atorvastatin (LIPITOR) 20 MG tablet TAKE 1 TABLET BY MOUTH DAILY 90 tablet 1    carvedilol (COREG) 6.25 MG tablet TAKE 1 TABLET BY MOUTH TWICE A DAY WITH A MEAL 180 tablet 1    coenzyme Q10 100 MG capsule Take 1 capsule by mouth Daily.      fluticasone (FLONASE) 50 MCG/ACT nasal spray Administer 2 sprays into the nostril(s) as directed by provider Daily As Needed.      furosemide (LASIX) 20 MG tablet Take 1 tablet by mouth Daily.      Multiple Vitamins-Minerals (MULTIVITAMIN ADULT PO) Take  by mouth Daily.      Omega-3 Fatty Acids (fish oil) 1000 MG capsule capsule Take  by mouth Daily With Breakfast.      vitamin B-12 (CYANOCOBALAMIN) 1000 MCG tablet Take 1 tablet by mouth Daily.      vitamin C (ASCORBIC ACID) 500 MG tablet Take 1 tablet by mouth Daily.       No current  facility-administered medications on file prior to visit.        No opioid medication identified on active medication list. I have reviewed chart for other potential  high risk medication/s and harmful drug interactions in the elderly.      No opioid listed on medication list       HPI FOR OTHER ACTIVE MEDICAL PROBLEMS:    History of Present Illness  The patient is here for a Medicare wellness visit combined with chronic medical conditions.    He continues to be managed by [facility], last seen on 10/10/2024 by Dr. Davenport. He has stage 3b/4 chronic kidney disease with a recent kidney injury, with creatinine levels rising to 2.4 from a baseline of 1.7 secondary to hypotension and a urinary tract infection (UTI), which has now resolved. His creatinine typically fluctuates between 1.4 and 1.7. He reports no significant discomfort from the swelling. He has been advised to limit salt intake due to lower extremity edema. Despite attempts to discontinue furosemide, he remains on a regimen of one 20 mg tablet daily, although he has not taken any today. His hypertension has been stable, and during his last office visit, nephrology decreased Lasix to 20 mg twice daily. He was instructed to recheck a renal panel in 1 week. He has been monitoring his diet, particularly sodium intake.    Hypertension: he reports that his blood pressure typically measures around 130 systolic over 72 diastolic.    He has been residing with his family, who provide assistance with meals and laundry. Over the past year, he has demonstrated increased independence, managing to dress himself and transfer to a wheelchair without assistance. He requires minimal assistance with bathing and uses Depends for incontinence. He has recently begun to independently transfer to the toilet, a task that previously required assistance. He reports no falls and acknowledges weakness on his right side, although notes significant improvement. He is not  interested in colon cancer screening or eye exam. He reports no blurred vision or floaters. He reports no numbness or tingling in his feet but does experience baseline numbness in his right hand. He has a living will in place. He reports good hearing, although notes some difficulty with his right ear. He reports no cough, shortness of breath, fever, chills, or rapid weight loss. He has not had an eye exam in the last 12 months and does not want one.     Type 2 diabetes : Controlled with diet he is not currently taking metformin and reports no loose stools. He maintains a consistent diet and does not consume excessive sugar.    He is on atorvastatin and reports no body aches or muscle aches.  Last LDL 86 hyperlipidemia controlled    He has not followed up on the lung nodule in 2023 during that hospitalization. He does not want to follow up on it.    Supplemental Information  He occasionally experiences shoulder issues and is under the care of Dr. Mendez, with follow-up as needed.    IMMUNIZATIONS  He received an RSV vaccine last time. Pneumonia and tetanus vaccines are up to date. He has not received a COVID-19 vaccine since 2021. He needs an influenza vaccine.    Patient or patient representative verbalized consent for the use of Ambient Listening during the visit with  Yo Mckeon DNP, APRN for chart documentation. 2/13/2025  09:42 EST     HISTORY     PFSH:     The following portions of the patient's history were reviewed and updated as appropriate: Allergies / Current Medications / Past Medical History / Surgical History / Social History / Family History    Problem List:  Patient Active Problem List   Diagnosis    Benign essential hypertension    Gastroesophageal reflux disease    Hyperglycemia    Hyperlipidemia    Nocturia    Renal insufficiency    Cerebrovascular accident - history of    Type 2 diabetes mellitus    Benign hypertension with chronic kidney disease    Stage 3b chronic kidney disease    Edema of  "lower extremity    Dyslipidemia    History of cerebrovascular accident    Pain of left shoulder joint on movement    Weakness    UTI (urinary tract infection)    Acute UTI (urinary tract infection)    Nodule of upper lobe of left lung    Abnormal CT of the abdomen    Abnormal CT of the head    Vitamin D deficiency       Past Medical History:  Past Medical History:   Diagnosis Date    Acute UTI (urinary tract infection)     Diabetes mellitus     Hypertension     Renal insufficiency        Past Surgical History:  Past Surgical History:   Procedure Laterality Date    HAND SURGERY Left 1969    FX    OTHER SURGICAL HISTORY Bilateral     multiple surgeries for fx  - both legs    OTHER SURGICAL HISTORY      blaze carotid procedure       Social History:  Social History     Socioeconomic History    Marital status: Single   Tobacco Use    Smoking status: Former     Current packs/day: 0.00     Average packs/day: 1 pack/day for 23.0 years (23.0 ttl pk-yrs)     Types: Cigarettes     Start date:      Quit date:      Years since quittin.1    Smokeless tobacco: Never   Vaping Use    Vaping status: Never Used   Substance and Sexual Activity    Alcohol use: Yes     Comment: occassional     Drug use: Never    Sexual activity: Defer       Family History:  Family History   Problem Relation Age of Onset    Stroke Mother     Heart attack Father     Stroke Brother     Diverticulitis Brother     Hypertension Brother          PATIENT ASSESSMENT     Vitals:  Vitals:    25 0830   BP: 138/76   Pulse: 61   Temp: 97.2 °F (36.2 °C)   SpO2: 98%   Weight: 79.4 kg (175 lb)   Height: 177.8 cm (70\")       BP Readings from Last 3 Encounters:   25 138/76   24 124/68   24 116/68     Wt Readings from Last 3 Encounters:   25 79.4 kg (175 lb)   24 74.8 kg (165 lb)   24 74.9 kg (165 lb 3.2 oz)      Body mass index is 25.11 kg/m².     Review of Systems:     Constitutional chronically ill  Resp neg  CV neg "       Physical Exam:    Physical Exam  Constitutional  No distress  Cardiovascular Rate  normal . Rhythm: regular . Heart sounds:  normal  Pulmonary/Chest  Effort normal. Breath sounds:  normal  Musc weakness lower extremities   Psychiatric  Alert. Judgment and thought content normal. Mood normal      Reviewed Data:    Labs:   Lab Results   Component Value Date     07/30/2024    K 4.8 07/30/2024    CALCIUM 10.0 07/30/2024    AST 14 07/30/2024    ALT 20 07/30/2024    BUN 29 (H) 07/30/2024    CREATININE 1.75 (H) 07/30/2024    CREATININE 1.44 (H) 07/08/2023    CREATININE 1.89 (H) 07/07/2023    EGFRIFNONA 40 (L) 08/31/2021    EGFRIFAFRI 48 (L) 08/31/2021     Lab Results   Component Value Date    HGBA1C 6.00 (H) 07/30/2024    HGBA1C 6.00 (H) 01/30/2024    HGBA1C 5.60 07/07/2023    MICROALBUR <3.0 01/03/2023     Lab Results   Component Value Date    LDL 86 07/30/2024    LDL 80 01/30/2024     06/13/2023    HDL 39 (L) 07/30/2024    TRIG 95 07/30/2024    CHOLHDLRATIO 3.67 07/30/2024     Lab Results   Component Value Date    TSH 1.060 07/30/2024    FREET4 1.43 07/30/2024     Lab Results   Component Value Date    WBC 12.31 (H) 07/08/2023    HGB 8.6 (L) 07/08/2023    HGB 9.0 (L) 07/07/2023    HGB 9.4 (L) 07/06/2023     07/08/2023     Lab Results   Component Value Date    PSA 1.970 01/30/2024    PSA 29.300 (H) 07/06/2023    PSA 0.8 05/17/2022       Imaging:                Medical Tests:                Summary of old records / correspondence / consultant report:               Request outside records:             SCREENING ASSESSMENT      Screening for Glaucoma:  Previous screening for glaucoma?: No    Hearing Loss Screen:  Finger Rub Hearing Test (right ear): Passed  Finger Rub Hearing Test (left ear): Passed    Urinary Incontinence Screen:  Episodes of urinary incontinence? :   YES  Will need close follow-up    Depression Screen:    PHQ-2 Depression Screening  Little interest or pleasure in doing things? Not  at all   Feeling down, depressed, or hopeless? Not at all   PHQ-2 Total Score 0       PHQ-2: 0 (Not depressed)    PHQ-9: 0 (Negative screening for depression)     FUNCTIONAL, FALL RISK, & COGNITIVE SCREENING     A) Functional and cognitive status based on patient responses:        2/13/2025     8:23 AM   Functional & Cognitive Status   Do you have difficulty preparing food and eating? No   Do you have difficulty bathing yourself, getting dressed or grooming yourself? No   Do you have difficulty using the toilet? No   Do you have difficulty moving around from place to place? Yes   Do you have trouble with steps or getting out of a bed or a chair? No   Current Diet Well Balanced Diet   Dental Exam Up to date   Eye Exam Up to date   Exercise (times per week) 0 times per week   Current Exercises Include No Regular Exercise   Do you need help using the phone?  No   Are you deaf or do you have serious difficulty hearing?  No   Do you need help to go to places out of walking distance? Yes   Do you need help shopping? Yes   Do you need help preparing meals?  Yes   Do you need help with housework?  Yes   Do you need help with laundry? Yes   Do you need help taking your medications? Yes   Do you need help managing money? Yes   Do you ever drive or ride in a car without wearing a seat belt? No   Have you felt unusual stress, anger or loneliness in the last month? No   Who do you live with? Other   If you need help, do you have trouble finding someone available to you? No   Have you been bothered in the last four weeks by sexual problems? No   Do you have difficulty concentrating, remembering or making decisions? No       B) Assessment of Fall Risk:    Fall Risk Assessment was completed, and patient is at MILD INCREASED RISK risk for falls.    Need for further evaluation of gait, strength, and balance? : NO    Timed Up and Go (TUG): Patient is mostly wheelchair-bound  (>= 12 seconds indicates high risk for  falling)    Observable abnormalities included:   impaired balance    C. Assessment of Cognitive Function:    Mini-Cog Test:     Patient declined to perform, due to weakness is unable to perform clock drawing     FURTHER EVALUATION REQUIRED?:   No    **OVERALL ASSESSMENT OF FUNCTIONAL ABILITY**  (Assessment of ability to perform ADL's (showering/bathing, using toilet, dressing, feeding self, moving self around) and IADL's (use telephone, shop, prepare food, housekeep, do laundry, transport independently, take medications independently, and handle finances)    DEGREE OF FUNCTIONAL IMPAIRMENT:   MODERATE (based on assessment noted above)    ABILITY TO LIVE INDEPENDENTLY:   Capable of living with spouse/partner/family     COUNSELING     A. Identification of Health Risk Factors:    Risk factors include:   cardiovascular risk factors    B. Age-Appropriate Screening Schedule:  (Refer to the list below for future screening recommendations based on patient's age, sex and/or medical conditions. Orders for these recommended tests are listed in the plan section. The patient has been provided with a written plan)    Health Maintenance Topics  Health Maintenance   Topic Date Due    ZOSTER VACCINE (1 of 2) Never done    DIABETIC EYE EXAM  02/06/2021    BMI FOLLOWUP  02/28/2024    INFLUENZA VACCINE  07/01/2024    COVID-19 Vaccine (4 - 2024-25 season) 09/01/2024    ANNUAL WELLNESS VISIT  01/30/2025    HEMOGLOBIN A1C  01/30/2025    LIPID PANEL  07/30/2025    URINE MICROALBUMIN-CREATININE RATIO (uACR)  12/10/2025    TDAP/TD VACCINES (2 - Td or Tdap) 07/26/2029    COLORECTAL CANCER SCREENING  07/26/2029    RSV Vaccine - Adults  Completed    Pneumococcal Vaccine 50+  Completed    LUNG CANCER SCREENING  Discontinued       Health Maintenance Topics Due or Over-Due  Health Maintenance Due   Topic Date Due    ZOSTER VACCINE (1 of 2) Never done    DIABETIC EYE EXAM  02/06/2021    BMI FOLLOWUP  02/28/2024    INFLUENZA VACCINE  07/01/2024     COVID-19 Vaccine (4 - 2024-25 season) 09/01/2024    ANNUAL WELLNESS VISIT  01/30/2025    HEMOGLOBIN A1C  01/30/2025         C. Advanced Care Planning:    Advance Care Planning   ACP discussion was held with the patient during this visit. Patient has an advance directive in EMR which is still valid.        D. Patient Self-Management and Personalized Health Advice:    He has been provided with PERSONALIZED COUNSELING/INFORMATION (AVS educational information) about:     optimizing diet/nutrition plans  improving exercise / conditioning  reducing risk for cardiovascular disease (heart, stroke, vascular)      He has been recommended for the following PREVENTATIVE SERVICES which has been performed today, will be ordered today or ordered/performed on upcoming follow-up visit:     LIFESTYLE PREVENTATIVE MEASURES  ALCOHOL use counseling completed  NUTRITION counseling provided  EXERCISE counseling provided  EYE exam for DIABETES recommended annually   CARDIOVASCULAR disease risk reduction counseling performed  FALL RISK assessment / plan of care completed  URINARY incontinence assessment done    CARDIOVASCULAR SCREENING  Lipid panel    CANCER SCREENING  N/A declines all further health maintenance    MISC SCREENING  DIABETES screening performed (current/reviewed labs/lab ordered)    VACCINATION/IMMUNIZATION  Influenza high-dose along with COVID-19 at pharmacy      E. Miscellaneous Items: [id number]    Aspirin use counseling: Taking ASA appropriately as indicated    Discussed BMI with him. The BMI is above average; BMI management plan is completed (discussed plans for weight loss)    Reviewed use of high risk medication in the elderly: YES    Reviewed for potential of harmful drug interactions in the elderly: YES      WRAP UP     ASSESSMENT & PLAN:    1) MEDICARE ANNUAL WELLNESS VISIT    2) OTHER MEDICAL CONDITIONS ADDRESSED TODAY:            Problem List Items Addressed This Visit       Hyperlipidemia    Relevant Medications     atorvastatin (LIPITOR) 20 MG tablet    Other Relevant Orders    Comprehensive Metabolic Panel    Lipid Panel With / Chol / HDL Ratio    TSH+Free T4    Type 2 diabetes mellitus    Relevant Orders    Hemoglobin A1c    Benign hypertension with chronic kidney disease    Relevant Medications    furosemide (LASIX) 20 MG tablet    carvedilol (COREG) 6.25 MG tablet    Other Relevant Orders    Comprehensive Metabolic Panel     Other Visit Diagnoses       Medicare annual wellness visit, subsequent    -  Primary                    Orders Placed This Encounter   Procedures    Comprehensive Metabolic Panel     Order Specific Question:   Release to patient     Answer:   Routine Release [[id number]]    Lipid Panel With / Chol / HDL Ratio     Order Specific Question:   Release to patient     Answer:   Routine Release [[id number]]    Hemoglobin A1c     Order Specific Question:   Release to patient     Answer:   Routine Release [[id number]]    TSH+Free T4     Order Specific Question:   Release to patient     Answer:   Routine Release [[id number]]        Discussion / Summary:    Assessment & Plan  1. Chronic Kidney Disease Stage 3b/4.  His kidney function is currently stable, with creatinine levels fluctuating between 1.4 and 1.7. He experienced an acute kidney injury in October 2024 due to hypotension and a urinary tract infection, which has since resolved. He continues to be managed by [facility] and was last seen by Dr. Davenport on October 10, 2024. During the last nephrology visit, Lasix was decreased to 20 mg daily.     2. Hypertension.  His hypertension is stable with blood pressure readings in the low 130s over 70s. He recently bought a new blood pressure cuff, which needs to be rechecked for accuracy. He is advised to bring his blood pressure cuff to the next visit for calibration.    3. Type 2 Diabetes Mellitus.  His diabetes is well-controlled with hemoglobin A1c levels ranging from 5.6 to 6.0. He is not  currently on metformin and reports no issues with blood sugar levels. Labs will be ordered to monitor his blood glucose levels.    4. Hyperlipidemia.  He is currently on atorvastatin and reports no muscle aches or other side effects. Labs will be ordered to monitor his cholesterol levels.    5. Lung Nodule.  He has a lung nodule that was identified during a hospitalization in 2023. He has previously declined follow-up on this issue and continues to do so.    6. Vitamin B12 Deficiency.  He is still on B12 supplements.    7. Health Maintenance.  He has not had an eye exam in the last 12 months and does not want one. He is up to date on his pneumonia and tetanus vaccines. He received the RSV vaccine last time. He has not had a COVID-19 vaccine since 2021 and does not want one. He is advised to get a high-dose influenza vaccine, which is available downstairs. He is also advised to consider the shingles vaccine, given his age and the potential severity of the disease.    Follow-up  The patient will follow up in 6 months for a regular chronic medical visit and in 1 year for a Medicare wellness visit combined with chronic medical management.    Medications as of TODAY:              Current Outpatient Medications   Medication Sig Dispense Refill    acetaminophen (TYLENOL) 500 MG tablet Take 2 tablets by mouth At Night As Needed.      ASPIRIN 81 PO Take 81 mg by mouth Daily.      atorvastatin (LIPITOR) 20 MG tablet TAKE 1 TABLET BY MOUTH DAILY 90 tablet 1    carvedilol (COREG) 6.25 MG tablet TAKE 1 TABLET BY MOUTH TWICE A DAY WITH A MEAL 180 tablet 1    coenzyme Q10 100 MG capsule Take 1 capsule by mouth Daily.      fluticasone (FLONASE) 50 MCG/ACT nasal spray Administer 2 sprays into the nostril(s) as directed by provider Daily As Needed.      furosemide (LASIX) 20 MG tablet Take 1 tablet by mouth Daily.      Multiple Vitamins-Minerals (MULTIVITAMIN ADULT PO) Take  by mouth Daily.      Omega-3 Fatty Acids (fish oil) 1000 MG  capsule capsule Take  by mouth Daily With Breakfast.      vitamin B-12 (CYANOCOBALAMIN) 1000 MCG tablet Take 1 tablet by mouth Daily.      vitamin C (ASCORBIC ACID) 500 MG tablet Take 1 tablet by mouth Daily.       No current facility-administered medications for this visit.         FOLLOW-UP:            Return for 6 months chronic medical; 1 year medicare wellness combined with chronic medical .              Future Appointments   Date Time Provider Department Center   8/14/2025  9:15 AM Yo Mckeon DNP, APRN [facility]  [facility]         After Visit Summary (AVS) including the Personalized Prevention  Plan Services (PPPS) was either printed and given to the patient at check-out today and/or sent to [facility] for review.

## 2025-04-28 DIAGNOSIS — E78.2 MIXED HYPERLIPIDEMIA: ICD-10-CM

## 2025-04-28 RX ORDER — ATORVASTATIN CALCIUM 20 MG/1
20 TABLET, FILM COATED ORAL DAILY
Qty: 90 TABLET | Refills: 1 | Status: SHIPPED | OUTPATIENT
Start: 2025-04-28

## 2025-04-28 RX ORDER — CARVEDILOL 6.25 MG/1
6.25 TABLET ORAL 2 TIMES DAILY WITH MEALS
Qty: 180 TABLET | Refills: 1 | Status: SHIPPED | OUTPATIENT
Start: 2025-04-28

## 2025-06-16 ENCOUNTER — APPOINTMENT (OUTPATIENT)
Dept: CT IMAGING | Facility: HOSPITAL | Age: 85
End: 2025-06-16
Payer: MEDICARE

## 2025-06-16 ENCOUNTER — HOSPITAL ENCOUNTER (OUTPATIENT)
Facility: HOSPITAL | Age: 85
Setting detail: OBSERVATION
Discharge: NURSING, REHAB OR LONG-TERM CARE | End: 2025-06-20
Attending: EMERGENCY MEDICINE | Admitting: HOSPITALIST
Payer: MEDICARE

## 2025-06-16 DIAGNOSIS — M54.50 LUMBAR BACK PAIN: Primary | ICD-10-CM

## 2025-06-16 DIAGNOSIS — R26.2 DIFFICULTY WALKING: ICD-10-CM

## 2025-06-16 DIAGNOSIS — R53.1 GENERALIZED WEAKNESS: ICD-10-CM

## 2025-06-16 DIAGNOSIS — N18.9 CHRONIC KIDNEY DISEASE, UNSPECIFIED CKD STAGE: ICD-10-CM

## 2025-06-16 LAB
ALBUMIN SERPL-MCNC: 4.2 G/DL (ref 3.5–5.2)
ALBUMIN/GLOB SERPL: 1.3 G/DL
ALP SERPL-CCNC: 97 U/L (ref 39–117)
ALT SERPL W P-5'-P-CCNC: 32 U/L (ref 1–41)
ANION GAP SERPL CALCULATED.3IONS-SCNC: 13.3 MMOL/L (ref 5–15)
AST SERPL-CCNC: 15 U/L (ref 1–40)
BASOPHILS # BLD AUTO: 0.02 10*3/MM3 (ref 0–0.2)
BASOPHILS NFR BLD AUTO: 0.2 % (ref 0–1.5)
BILIRUB SERPL-MCNC: 0.7 MG/DL (ref 0–1.2)
BUN SERPL-MCNC: 25 MG/DL (ref 8–23)
BUN/CREAT SERPL: 12.1 (ref 7–25)
CALCIUM SPEC-SCNC: 9.8 MG/DL (ref 8.6–10.5)
CHLORIDE SERPL-SCNC: 105 MMOL/L (ref 98–107)
CO2 SERPL-SCNC: 22.7 MMOL/L (ref 22–29)
CREAT SERPL-MCNC: 2.07 MG/DL (ref 0.76–1.27)
DEPRECATED RDW RBC AUTO: 44.9 FL (ref 37–54)
EGFRCR SERPLBLD CKD-EPI 2021: 31.4 ML/MIN/1.73
EOSINOPHIL # BLD AUTO: 0.15 10*3/MM3 (ref 0–0.4)
EOSINOPHIL NFR BLD AUTO: 1.4 % (ref 0.3–6.2)
ERYTHROCYTE [DISTWIDTH] IN BLOOD BY AUTOMATED COUNT: 12.1 % (ref 12.3–15.4)
GLOBULIN UR ELPH-MCNC: 3.2 GM/DL
GLUCOSE SERPL-MCNC: 141 MG/DL (ref 65–99)
HCT VFR BLD AUTO: 45.8 % (ref 37.5–51)
HGB BLD-MCNC: 15.5 G/DL (ref 13–17.7)
IMM GRANULOCYTES # BLD AUTO: 0.03 10*3/MM3 (ref 0–0.05)
IMM GRANULOCYTES NFR BLD AUTO: 0.3 % (ref 0–0.5)
LYMPHOCYTES # BLD AUTO: 1.81 10*3/MM3 (ref 0.7–3.1)
LYMPHOCYTES NFR BLD AUTO: 16.9 % (ref 19.6–45.3)
MCH RBC QN AUTO: 33.9 PG (ref 26.6–33)
MCHC RBC AUTO-ENTMCNC: 33.8 G/DL (ref 31.5–35.7)
MCV RBC AUTO: 100.2 FL (ref 79–97)
MONOCYTES # BLD AUTO: 0.57 10*3/MM3 (ref 0.1–0.9)
MONOCYTES NFR BLD AUTO: 5.3 % (ref 5–12)
NEUTROPHILS NFR BLD AUTO: 75.9 % (ref 42.7–76)
NEUTROPHILS NFR BLD AUTO: 8.11 10*3/MM3 (ref 1.7–7)
NRBC BLD AUTO-RTO: 0 /100 WBC (ref 0–0.2)
PLATELET # BLD AUTO: 237 10*3/MM3 (ref 140–450)
PMV BLD AUTO: 10.4 FL (ref 6–12)
POTASSIUM SERPL-SCNC: 4.9 MMOL/L (ref 3.5–5.2)
PROT SERPL-MCNC: 7.4 G/DL (ref 6–8.5)
RBC # BLD AUTO: 4.57 10*6/MM3 (ref 4.14–5.8)
SODIUM SERPL-SCNC: 141 MMOL/L (ref 136–145)
WBC NRBC COR # BLD AUTO: 10.69 10*3/MM3 (ref 3.4–10.8)

## 2025-06-16 PROCEDURE — 96374 THER/PROPH/DIAG INJ IV PUSH: CPT

## 2025-06-16 PROCEDURE — 99285 EMERGENCY DEPT VISIT HI MDM: CPT

## 2025-06-16 PROCEDURE — G0378 HOSPITAL OBSERVATION PER HR: HCPCS

## 2025-06-16 PROCEDURE — 72131 CT LUMBAR SPINE W/O DYE: CPT

## 2025-06-16 PROCEDURE — 96361 HYDRATE IV INFUSION ADD-ON: CPT

## 2025-06-16 PROCEDURE — 80053 COMPREHEN METABOLIC PANEL: CPT | Performed by: EMERGENCY MEDICINE

## 2025-06-16 PROCEDURE — 25010000002 KETOROLAC TROMETHAMINE PER 15 MG: Performed by: EMERGENCY MEDICINE

## 2025-06-16 PROCEDURE — 85025 COMPLETE CBC W/AUTO DIFF WBC: CPT | Performed by: EMERGENCY MEDICINE

## 2025-06-16 PROCEDURE — 25810000003 SODIUM CHLORIDE 0.9 % SOLUTION: Performed by: HOSPITALIST

## 2025-06-16 RX ORDER — HYDROCODONE BITARTRATE AND ACETAMINOPHEN 5; 325 MG/1; MG/1
1 TABLET ORAL EVERY 4 HOURS PRN
Refills: 0 | Status: DISCONTINUED | OUTPATIENT
Start: 2025-06-16 | End: 2025-06-20 | Stop reason: HOSPADM

## 2025-06-16 RX ORDER — CARVEDILOL 6.25 MG/1
6.25 TABLET ORAL 2 TIMES DAILY WITH MEALS
Status: DISCONTINUED | OUTPATIENT
Start: 2025-06-16 | End: 2025-06-20 | Stop reason: HOSPADM

## 2025-06-16 RX ORDER — SODIUM CHLORIDE 9 MG/ML
40 INJECTION, SOLUTION INTRAVENOUS AS NEEDED
Status: DISCONTINUED | OUTPATIENT
Start: 2025-06-16 | End: 2025-06-20 | Stop reason: HOSPADM

## 2025-06-16 RX ORDER — AMOXICILLIN 250 MG
2 CAPSULE ORAL 2 TIMES DAILY PRN
Status: DISCONTINUED | OUTPATIENT
Start: 2025-06-16 | End: 2025-06-20 | Stop reason: HOSPADM

## 2025-06-16 RX ORDER — MULTIPLE VITAMINS W/ MINERALS TAB 9MG-400MCG
1 TAB ORAL DAILY
Status: DISCONTINUED | OUTPATIENT
Start: 2025-06-17 | End: 2025-06-20 | Stop reason: HOSPADM

## 2025-06-16 RX ORDER — ACETAMINOPHEN 325 MG/1
650 TABLET ORAL EVERY 4 HOURS PRN
Status: DISCONTINUED | OUTPATIENT
Start: 2025-06-16 | End: 2025-06-20 | Stop reason: HOSPADM

## 2025-06-16 RX ORDER — ONDANSETRON 4 MG/1
4 TABLET, ORALLY DISINTEGRATING ORAL EVERY 6 HOURS PRN
Status: DISCONTINUED | OUTPATIENT
Start: 2025-06-16 | End: 2025-06-20 | Stop reason: HOSPADM

## 2025-06-16 RX ORDER — SODIUM CHLORIDE 0.9 % (FLUSH) 0.9 %
10 SYRINGE (ML) INJECTION EVERY 12 HOURS SCHEDULED
Status: DISCONTINUED | OUTPATIENT
Start: 2025-06-16 | End: 2025-06-20 | Stop reason: HOSPADM

## 2025-06-16 RX ORDER — METHOCARBAMOL 500 MG/1
500 TABLET, FILM COATED ORAL ONCE
Status: COMPLETED | OUTPATIENT
Start: 2025-06-16 | End: 2025-06-16

## 2025-06-16 RX ORDER — KETOROLAC TROMETHAMINE 15 MG/ML
7.5 INJECTION, SOLUTION INTRAMUSCULAR; INTRAVENOUS ONCE
Status: COMPLETED | OUTPATIENT
Start: 2025-06-16 | End: 2025-06-16

## 2025-06-16 RX ORDER — ASCORBIC ACID 500 MG
500 TABLET ORAL DAILY
Status: DISCONTINUED | OUTPATIENT
Start: 2025-06-16 | End: 2025-06-20 | Stop reason: HOSPADM

## 2025-06-16 RX ORDER — ATORVASTATIN CALCIUM 20 MG/1
20 TABLET, FILM COATED ORAL DAILY
Status: DISCONTINUED | OUTPATIENT
Start: 2025-06-16 | End: 2025-06-20 | Stop reason: HOSPADM

## 2025-06-16 RX ORDER — LIDOCAINE 4 G/G
1 PATCH TOPICAL ONCE
Status: COMPLETED | OUTPATIENT
Start: 2025-06-16 | End: 2025-06-16

## 2025-06-16 RX ORDER — SODIUM CHLORIDE 0.9 % (FLUSH) 0.9 %
10 SYRINGE (ML) INJECTION AS NEEDED
Status: DISCONTINUED | OUTPATIENT
Start: 2025-06-16 | End: 2025-06-20 | Stop reason: HOSPADM

## 2025-06-16 RX ORDER — LIDOCAINE 4 G/G
1 PATCH TOPICAL
Status: DISCONTINUED | OUTPATIENT
Start: 2025-06-16 | End: 2025-06-20 | Stop reason: HOSPADM

## 2025-06-16 RX ORDER — BISACODYL 10 MG
10 SUPPOSITORY, RECTAL RECTAL DAILY PRN
Status: DISCONTINUED | OUTPATIENT
Start: 2025-06-16 | End: 2025-06-20 | Stop reason: HOSPADM

## 2025-06-16 RX ORDER — MULTIVITAMIN WITH IRON
1000 TABLET ORAL DAILY
Status: DISCONTINUED | OUTPATIENT
Start: 2025-06-16 | End: 2025-06-20 | Stop reason: HOSPADM

## 2025-06-16 RX ORDER — ASPIRIN 81 MG/1
81 TABLET ORAL DAILY
Status: DISCONTINUED | OUTPATIENT
Start: 2025-06-16 | End: 2025-06-20 | Stop reason: HOSPADM

## 2025-06-16 RX ORDER — ACETAMINOPHEN 650 MG/1
650 SUPPOSITORY RECTAL EVERY 4 HOURS PRN
Status: DISCONTINUED | OUTPATIENT
Start: 2025-06-16 | End: 2025-06-20 | Stop reason: HOSPADM

## 2025-06-16 RX ORDER — SODIUM CHLORIDE 9 MG/ML
100 INJECTION, SOLUTION INTRAVENOUS CONTINUOUS
Status: ACTIVE | OUTPATIENT
Start: 2025-06-16 | End: 2025-06-17

## 2025-06-16 RX ORDER — POLYETHYLENE GLYCOL 3350 17 G/17G
17 POWDER, FOR SOLUTION ORAL DAILY PRN
Status: DISCONTINUED | OUTPATIENT
Start: 2025-06-16 | End: 2025-06-20 | Stop reason: HOSPADM

## 2025-06-16 RX ORDER — ONDANSETRON 2 MG/ML
4 INJECTION INTRAMUSCULAR; INTRAVENOUS EVERY 6 HOURS PRN
Status: DISCONTINUED | OUTPATIENT
Start: 2025-06-16 | End: 2025-06-20 | Stop reason: HOSPADM

## 2025-06-16 RX ORDER — BISACODYL 5 MG/1
5 TABLET, DELAYED RELEASE ORAL DAILY PRN
Status: DISCONTINUED | OUTPATIENT
Start: 2025-06-16 | End: 2025-06-20 | Stop reason: HOSPADM

## 2025-06-16 RX ORDER — ACETAMINOPHEN 160 MG/5ML
650 SOLUTION ORAL EVERY 4 HOURS PRN
Status: DISCONTINUED | OUTPATIENT
Start: 2025-06-16 | End: 2025-06-20 | Stop reason: HOSPADM

## 2025-06-16 RX ADMIN — Medication 10 ML: at 20:01

## 2025-06-16 RX ADMIN — ATORVASTATIN CALCIUM 20 MG: 20 TABLET, FILM COATED ORAL at 20:01

## 2025-06-16 RX ADMIN — CARVEDILOL 6.25 MG: 6.25 TABLET, FILM COATED ORAL at 20:01

## 2025-06-16 RX ADMIN — LIDOCAINE 1 PATCH: 4 PATCH TOPICAL at 20:01

## 2025-06-16 RX ADMIN — OXYCODONE HYDROCHLORIDE AND ACETAMINOPHEN 500 MG: 500 TABLET ORAL at 20:01

## 2025-06-16 RX ADMIN — Medication 1000 MCG: at 20:01

## 2025-06-16 RX ADMIN — METHOCARBAMOL TABLETS 500 MG: 500 TABLET, COATED ORAL at 10:31

## 2025-06-16 RX ADMIN — KETOROLAC TROMETHAMINE 7.5 MG: 15 INJECTION INTRAMUSCULAR at 10:30

## 2025-06-16 RX ADMIN — LIDOCAINE 1 PATCH: 4 PATCH TOPICAL at 10:30

## 2025-06-16 RX ADMIN — ASPIRIN 81 MG: 81 TABLET, COATED ORAL at 20:01

## 2025-06-16 RX ADMIN — SODIUM CHLORIDE 100 ML/HR: 9 INJECTION, SOLUTION INTRAVENOUS at 21:05

## 2025-06-16 NOTE — ED PROVIDER NOTES
EMERGENCY DEPARTMENT ENCOUNTER  Room Number:  17/17  PCP: Yo Mckeon DNP, APRN  Independent Historians: Patient and EMS      HPI:  Chief Complaint: had concerns including Back Pain and Weakness - Generalized.     A complete HPI/ROS/PMH/PSH/SH/FH are unobtainable due to: None    Chronic or social conditions impacting patient care (Social Determinants of Health): None      Context: Benja Macedo is a 84 y.o. male with a medical history of diabetes, hypertension and renal insufficiency who presents to the ED c/o acute severe lower back pain, primarily in the right lower back area and resultant generalized weakness.  Patient lives at home with his brother and sister-in-law.  For the past 3 days he has had intense and worsening pain across the lower back.  It seems to be more profound in the right lower back right now.  He typically is able to mobilize throughout his home in a wheelchair and by using a walker for ambulation.  This has become more difficult in recent days because of the pain.  He denies fevers.  Denies abdominal pain.  Denies bowel or bladder incontinence.  His sister-in-law gave him 2 hydrocodone tablet medications over the weekend which did help alleviate the pain temporarily.  But it has returned again this morning.      Review of prior external notes (non-ED) -and- Review of prior external test results outside of this encounter: I independently reviewed the internal medicine progress note from February 13, 2025.  Patient had Medicare annual wellness visit at that time.  Discussed continued management of hyperlipidemia, diabetes and hypertension.  Had Lasix and Coreg prescribed.    Prescription drug monitoring program review: CARISSA reviewed by Tony Charles MD, Neo Chauhan MD       PAST MEDICAL HISTORY  Active Ambulatory Problems     Diagnosis Date Noted    Benign essential hypertension 03/20/2016    Gastroesophageal reflux disease 03/20/2016    Hyperglycemia 03/20/2016     Hyperlipidemia 2016    Nocturia 2016    Renal insufficiency 2016    Cerebrovascular accident - history of 2016    Type 2 diabetes mellitus 2021    Benign hypertension with chronic kidney disease 2021    Stage 3b chronic kidney disease 2021    Edema of lower extremity 2021    Dyslipidemia 2016    History of cerebrovascular accident 2016    Pain of left shoulder joint on movement 2023    Weakness 2023    UTI (urinary tract infection) 2023    Acute UTI (urinary tract infection) 2023    Nodule of upper lobe of left lung 2023    Abnormal CT of the abdomen 2023    Abnormal CT of the head 2023    Vitamin D deficiency 2023     Resolved Ambulatory Problems     Diagnosis Date Noted    Urinary tract infection 2016    Type 2 diabetes mellitus with kidney complication, without long-term current use of insulin 2016     Past Medical History:   Diagnosis Date    Diabetes mellitus     Hypertension          PAST SURGICAL HISTORY  Past Surgical History:   Procedure Laterality Date    HAND SURGERY Left 1969    FX    OTHER SURGICAL HISTORY Bilateral     multiple surgeries for fx  - both legs    OTHER SURGICAL HISTORY      blaze carotid procedure         FAMILY HISTORY  Family History   Problem Relation Age of Onset    Stroke Mother     Heart attack Father     Stroke Brother     Diverticulitis Brother     Hypertension Brother          SOCIAL HISTORY  Social History     Socioeconomic History    Marital status: Single   Tobacco Use    Smoking status: Former     Current packs/day: 0.00     Average packs/day: 1 pack/day for 23.0 years (23.0 ttl pk-yrs)     Types: Cigarettes     Start date:      Quit date:      Years since quittin.4    Smokeless tobacco: Never   Vaping Use    Vaping status: Never Used   Substance and Sexual Activity    Alcohol use: Yes     Comment: occassional     Drug use: Never    Sexual  activity: Defer         ALLERGIES  Patient has no known allergies.      REVIEW OF SYSTEMS  Review of Systems  Included in HPI  All systems reviewed and negative except for those discussed in HPI.      PHYSICAL EXAM    I have reviewed the triage vital signs and nursing notes.    ED Triage Vitals [06/16/25 0943]   Temp Heart Rate Resp BP SpO2   98.4 °F (36.9 °C) 72 18 168/78 96 %      Temp src Heart Rate Source Patient Position BP Location FiO2 (%)   Oral Monitor -- -- --       Physical Exam  GENERAL: alert, appears uncomfortable but no acute distress  SKIN: Warm, dry, no rashes  HENT: Normocephalic, atraumatic  EYES: no scleral icterus, normal conjunctivae  CV: regular rhythm, regular rate  RESPIRATORY: normal effort, lungs clear bilaterally  ABDOMEN: soft, nondistended, nontender  MUSCULOSKELETAL: no deformity, no asymmetry of the lower extremities.  Moderate, symmetric edema noted to the bilateral feet and ankles and lower legs.  Moderate tenderness palpation is noted to the right lumbosacral back soft tissues.  There is no midline step-off or deformity palpable.  NEURO: alert, moves all extremities, follows commands, no focal motor or sensory deficit noted to the lower extremities.      LAB RESULTS  Recent Results (from the past 24 hours)   Comprehensive Metabolic Panel    Collection Time: 06/16/25 10:04 AM    Specimen: Arm, Left; Blood   Result Value Ref Range    Glucose 141 (H) 65 - 99 mg/dL    BUN 25.0 (H) 8.0 - 23.0 mg/dL    Creatinine 2.07 (H) 0.76 - 1.27 mg/dL    Sodium 141 136 - 145 mmol/L    Potassium 4.9 3.5 - 5.2 mmol/L    Chloride 105 98 - 107 mmol/L    CO2 22.7 22.0 - 29.0 mmol/L    Calcium 9.8 8.6 - 10.5 mg/dL    Total Protein 7.4 6.0 - 8.5 g/dL    Albumin 4.2 3.5 - 5.2 g/dL    ALT (SGPT) 32 1 - 41 U/L    AST (SGOT) 15 1 - 40 U/L    Alkaline Phosphatase 97 39 - 117 U/L    Total Bilirubin 0.7 0.0 - 1.2 mg/dL    Globulin 3.2 gm/dL    A/G Ratio 1.3 g/dL    BUN/Creatinine Ratio 12.1 7.0 - 25.0    Anion  Gap 13.3 5.0 - 15.0 mmol/L    eGFR 31.4 (L) >60.0 mL/min/1.73   CBC Auto Differential    Collection Time: 06/16/25 10:04 AM    Specimen: Arm, Left; Blood   Result Value Ref Range    WBC 10.69 3.40 - 10.80 10*3/mm3    RBC 4.57 4.14 - 5.80 10*6/mm3    Hemoglobin 15.5 13.0 - 17.7 g/dL    Hematocrit 45.8 37.5 - 51.0 %    .2 (H) 79.0 - 97.0 fL    MCH 33.9 (H) 26.6 - 33.0 pg    MCHC 33.8 31.5 - 35.7 g/dL    RDW 12.1 (L) 12.3 - 15.4 %    RDW-SD 44.9 37.0 - 54.0 fl    MPV 10.4 6.0 - 12.0 fL    Platelets 237 140 - 450 10*3/mm3    Neutrophil % 75.9 42.7 - 76.0 %    Lymphocyte % 16.9 (L) 19.6 - 45.3 %    Monocyte % 5.3 5.0 - 12.0 %    Eosinophil % 1.4 0.3 - 6.2 %    Basophil % 0.2 0.0 - 1.5 %    Immature Grans % 0.3 0.0 - 0.5 %    Neutrophils, Absolute 8.11 (H) 1.70 - 7.00 10*3/mm3    Lymphocytes, Absolute 1.81 0.70 - 3.10 10*3/mm3    Monocytes, Absolute 0.57 0.10 - 0.90 10*3/mm3    Eosinophils, Absolute 0.15 0.00 - 0.40 10*3/mm3    Basophils, Absolute 0.02 0.00 - 0.20 10*3/mm3    Immature Grans, Absolute 0.03 0.00 - 0.05 10*3/mm3    nRBC 0.0 0.0 - 0.2 /100 WBC         RADIOLOGY  CT Lumbar Spine Without Contrast  Result Date: 6/16/2025  EMERGENCY CT SCAN OF THE LUMBAR SPINE WITHOUT CONTRAST ON 06/16/2025  CLINICAL HISTORY: This is an 84-year-old male patient, who complains of acute low back pain, primarily in right lower back that results in some generalized weakness.  TECHNIQUE: Spiral CT images were obtained from the T10-T11 thoracic disc space level down to the superior body of the S4 sacral segment, and the images were reformatted and are submitted in 3 mm thick axial CT sections with soft tissue algorithm, and 1 mm thick axial CT sections with high resolution bone algorithm, and 2 mm thick sagittal and coronal reconstructions were performed and submitted in soft tissue algorithm.  FINDINGS: At T10-T11, there is only partial visualization of the T10-T11 disc space level and there is some motion artifact. There is a  large right anterior marginal osteophyte that contains some vacuum disc phenomenon from the anterior disc space insinuating between the anterior marginal osteophytes. There is blurring of the posterior disc margin and the canal and foramina at T10-T11 are not adequately assessed.  At T11-T12, there is minimal posterior disc bulge. The facets are normal. I see no canal or foraminal narrowing.  At T12-L1, the posterior disc margin and facets are normal with no canal or foraminal narrowing.  At L1-L2, there is minimal bilateral facet overgrowth, some ligamentum flavum thickening off the anteromedial facets. There is mild diffuse posterior disc bulge and there is up to mild-to-moderate narrowing of the thecal sac. There is mild bilateral foraminal narrowing.  At L2-L3, there is a minimal posterior disc bulge. There is mild left and minimal right facet overgrowth. There is mild narrowing of the thecal sac and mild foraminal narrowing.  At L3-L4, there is mild bilateral facet overgrowth. There is a 2 mm retrolisthesis of L3 with respect to L4, and a mild diffuse posterior disc bulge. There is at least mild up to mild-to-moderate narrowing of the thecal sac and there is mild-to-moderate bilateral foraminal narrowing.  At L4-L5, there is moderate right and moderate-to-severe left facet overgrowth. There is a 2-3 mm degenerative anterolisthesis of L4 with respect to L5. There is only mild narrowing of the thecal sac. There is slight narrowing of the left lateral recess. There is minimal right foraminal narrowing. There is some bulging disc material into the inferior left foramen and to the far left laterally, synovial thickening off the facets extending into the posterior left foramen. There is mild-to-moderate left foraminal narrowing.  At L5-S1, there is mild left and there is mild-to-moderate right facet overgrowth. The posterior disc margin is normal. There is no canal or lateral recess or foraminal narrowing.  No acute  fracture is seen in the lumbar spine.      1. No convincing acute fracture is seen in the lumbar spine.  2. There is lumbar spondylosis as described above.  3. The images start superiorly at the level of the T10-T11 thoracic interspace and there is artifact streaking through the canal and along the posterior disc margin, and then on the soft tissue axial images suggests possible disc herniation and canal narrowing, but I think this is probably artifact. If there are symptoms referable to the lower thoracic cord, CT or MRI of the thoracic spine could be obtained to more completely assess this level. The results were communicated to Dr. Chauhan in the ER by telephone on 06/16/2025 at 12:30 p.m.  Radiation dose reduction techniques were utilized, including automated exposure control and exposure modulation based on body size.           MEDICATIONS GIVEN IN ER  Medications   Lidocaine 4 % 1 patch (1 patch Transdermal Medication Applied 6/16/25 1030)   methocarbamol (ROBAXIN) tablet 500 mg (500 mg Oral Given 6/16/25 1031)   ketorolac (TORADOL) injection 7.5 mg (7.5 mg Intravenous Given 6/16/25 1030)         ORDERS PLACED DURING THIS VISIT:  Orders Placed This Encounter   Procedures    CT Lumbar Spine Without Contrast    Comprehensive Metabolic Panel    CBC Auto Differential    [facility] (on-call MD unless specified) Details    Initiate Observation Status    CBC & Differential         OUTPATIENT MEDICATION MANAGEMENT:  Current Facility-Administered Medications Ordered in Epic   Medication Dose Route Frequency Provider Last Rate Last Admin    Lidocaine 4 % 1 patch  1 patch Transdermal Once Neo Chauhan MD   1 patch at 06/16/25 1030     Current Outpatient Medications Ordered in Epic   Medication Sig Dispense Refill    acetaminophen (TYLENOL) 500 MG tablet Take 2 tablets by mouth At Night As Needed.      ASPIRIN 81 PO Take 81 mg by mouth Daily.      atorvastatin (LIPITOR) 20 MG tablet TAKE 1 TABLET BY MOUTH DAILY 90 tablet 1     carvedilol (COREG) 6.25 MG tablet TAKE 1 TABLET BY MOUTH TWICE A DAY WITH A MEAL 180 tablet 1    coenzyme Q10 100 MG capsule Take 1 capsule by mouth Daily.      fluticasone (FLONASE) 50 MCG/ACT nasal spray Administer 2 sprays into the nostril(s) as directed by provider Daily As Needed.      furosemide (LASIX) 20 MG tablet Take 1 tablet by mouth Daily.      Multiple Vitamins-Minerals (MULTIVITAMIN ADULT PO) Take  by mouth Daily.      Omega-3 Fatty Acids (fish oil) 1000 MG capsule capsule Take  by mouth Daily With Breakfast.      vitamin B-12 (CYANOCOBALAMIN) 1000 MCG tablet Take 1 tablet by mouth Daily.      vitamin C (ASCORBIC ACID) 500 MG tablet Take 1 tablet by mouth Daily.           PROCEDURES  Procedures          PROGRESS, DATA ANALYSIS, CONSULTS, AND MEDICAL DECISION MAKING  All labs have been independently interpreted by me.  All radiology studies have been reviewed by me. All EKG's have been independently viewed and interpreted by me.  Discussion below represents my analysis of pertinent findings related to patient's condition, differential diagnosis, treatment plan and final disposition.    Differential diagnosis includes but is not limited to degenerative disc disease, cauda equina syndrome, lumbar radiculopathy, muscular strain, pyelonephritis.    Clinical Scores:                   ED Course as of 06/16/25 1358   Mon Jun 16, 2025   1202 Creatinine(!): 2.07 [MADONNA]   1256 I independently interpreted the CT lumbar spine without contrast study and my findings are: No acute fracture [MADONNA]   1256 I discussed with Dr. Oconnor from radiology about the patient's CT findings.  He indicates there are multiple levels of degenerative changes [MADONNA]   1256 On repeat evaluation, patient continues to complain of severe pain across the lower back.  His sister-in-law is here now.  She says that the patient has had worsening pain and subsequent worsening mobility to the point that he can no longer stand up or help to transfer.  She  says he is beyond their level of care and assistance which they can provide at home. [MADONNA]   3286 I discussed with Dr. Charles from [facility] about this patient.  He agrees to admit him to the hospitalist team for further care needs today. [MADONNA]      ED Course User Index  [MADONNA] Neo Chauhan MD             AS OF 13:58 EDT VITALS:    BP - 173/87  HR - 71  TEMP - 98.4 °F (36.9 °C) (Oral)  O2 SATS - 97%    COMPLEXITY OF CARE  The patient requires admission.      DIAGNOSIS  Final diagnoses:   Lumbar back pain   Generalized weakness   Difficulty walking   Chronic kidney disease, unspecified CKD stage         DISPOSITION  ED Disposition       ED Disposition   Decision to Admit    Condition   --    Comment   Level of Care: Med/Surg [1]   Diagnosis: Lumbar back pain [509720]   Admitting Physician: VICTORIANO CHARLES [[id number]]   Attending Physician: VICTORIANO CHARLES [[id number]]   Is patient appropriate for Inpatient Observation Unit?: Yes [1]                  Please note that portions of this document were completed with a voice recognition program.    Note Disclaimer: At [facility], we believe that sharing information builds trust and better relationships. You are receiving this note because you recently visited [facility]. It is possible you will see health information before a provider has talked with you about it. This kind of information can be easy to misunderstand. To help you fully understand what it means for your health, we urge you to discuss this note with your provider.         Neo Chauhan MD  06/16/25 [id number]

## 2025-06-16 NOTE — ED NOTES
Nursing report ED to floor  Benja Macedo  84 y.o.  male    HPI :  HPI  Stated Reason for Visit: lower back pain, weakness    Chief Complaint  Chief Complaint   Patient presents with    Back Pain    Weakness - Generalized       Admitting doctor:   Tony Charles MD    Admitting diagnosis:   The primary encounter diagnosis was Lumbar back pain. Diagnoses of Generalized weakness, Difficulty walking, and Chronic kidney disease, unspecified CKD stage were also pertinent to this visit.    Code status:   Current Code Status       Date Active Code Status Order ID Comments User Context       Prior            Allergies:   Patient has no known allergies.    Isolation:   No active isolations    Intake and Output  No intake or output data in the 24 hours ending 06/16/25 1416    Weight:   There were no vitals filed for this visit.    Most recent vitals:   Vitals:    06/16/25 1217 06/16/25 1218 06/16/25 1220 06/16/25 1320   BP:   160/65 173/87   Pulse: 66 61 63 71   Resp:       Temp:       TempSrc:       SpO2: 96% 97% 97% 97%   Height:           Active LDAs/IV Access:   Lines, Drains & Airways       Active LDAs       Name Placement date Placement time Site Days    Peripheral IV 06/16/25 1018 20 G Left Antecubital 06/16/25  1018  Antecubital  less than 1                    Labs (abnormal labs have a star):   Labs Reviewed   COMPREHENSIVE METABOLIC PANEL - Abnormal; Notable for the following components:       Result Value    Glucose 141 (*)     BUN 25.0 (*)     Creatinine 2.07 (*)     eGFR 31.4 (*)     All other components within normal limits    Narrative:     GFR Categories in Chronic Kidney Disease (CKD)              GFR Category          GFR (mL/min/1.73)    Interpretation  G1                    90 or greater        Normal or high (1)  G2                    60-89                Mild decrease (1)  G3a                   45-59                Mild to moderate decrease  G3b                   30-44                Moderate  to severe decrease  G4                    15-29                Severe decrease  G5                    14 or less           Kidney failure    (1)In the absence of evidence of kidney disease, neither GFR category G1 or G2 fulfill the criteria for CKD.    eGFR calculation 2021 CKD-EPI creatinine equation, which does not include race as a factor   CBC WITH AUTO DIFFERENTIAL - Abnormal; Notable for the following components:    .2 (*)     MCH 33.9 (*)     RDW 12.1 (*)     Lymphocyte % 16.9 (*)     Neutrophils, Absolute 8.11 (*)     All other components within normal limits   CBC AND DIFFERENTIAL    Narrative:     The following orders were created for panel order CBC & Differential.  Procedure                               Abnormality         Status                     ---------                               -----------         ------                     CBC Auto Differential[[id number]]        Abnormal            Final result                 Please view results for these tests on the individual orders.       EKG:   No orders to display       Meds given in ED:   Medications   Lidocaine 4 % 1 patch (1 patch Transdermal Medication Applied 6/16/25 1030)   methocarbamol (ROBAXIN) tablet 500 mg (500 mg Oral Given 6/16/25 1031)   ketorolac (TORADOL) injection 7.5 mg (7.5 mg Intravenous Given 6/16/25 1030)       Imaging results:  CT Lumbar Spine Without Contrast  Result Date: 6/16/2025  1. No convincing acute fracture is seen in the lumbar spine.  2. There is lumbar spondylosis as described above.  3. The images start superiorly at the level of the T10-T11 thoracic interspace and there is artifact streaking through the canal and along the posterior disc margin, and then on the soft tissue axial images suggests possible disc herniation and canal narrowing, but I think this is probably artifact. If there are symptoms referable to the lower thoracic cord, CT or MRI of the thoracic spine could be obtained to more completely  assess this level. The results were communicated to Dr. Chauhan in the ER by telephone on 2025 at 12:30 p.m.  Radiation dose reduction techniques were utilized, including automated exposure control and exposure modulation based on body size.         Ambulatory status:   - assist     Social issues:   Social History     Socioeconomic History    Marital status: Single   Tobacco Use    Smoking status: Former     Current packs/day: 0.00     Average packs/day: 1 pack/day for 23.0 years (23.0 ttl pk-yrs)     Types: Cigarettes     Start date:      Quit date:      Years since quittin.4    Smokeless tobacco: Never   Vaping Use    Vaping status: Never Used   Substance and Sexual Activity    Alcohol use: Yes     Comment: occassional     Drug use: Never    Sexual activity: Defer       Peripheral Neurovascular  Peripheral Neurovascular (Adult)  Peripheral Neurovascular WDL: WDL, capillary refill  Capillary Refill, General: less than/equal to 3 secs    Neuro Cognitive  Neuro Cognitive (Adult)  Cognitive/Neuro/Behavioral WDL: WDL, orientation  Orientation: oriented x 4    Learning  Learning Assessment  Learning Readiness and Ability: no barriers identified    Respiratory  Respiratory WDL  Respiratory WDL: WDL    Abdominal Pain       Pain Assessments  Pain (Adult)  (0-10) Pain Rating: Rest: 4  (0-10) Pain Rating: Activity: 10  Pain Reassessment while sleeping: appears asleep with no signs of discomfort or distress  Pain Location: back  Pain Side/Orientation: right, lower  Response to Pain Interventions: interventions effective per patient    NIH Stroke Scale       Nora Lopez RN  25 14:16 EDT

## 2025-06-16 NOTE — H&P
HISTORY AND PHYSICAL   [facility]        Patient Identification:  Name: Benja Macedo  Age: 84 y.o.  Sex: male  :  1940  MRN: [id number]                     Primary Care Physician: Yo Mckeon, ANN MARIE, APRN    Chief Complaint: Low back pain    History of Present Illness:   Pleasant 84-year-old gentleman with history of poor mobility who is mostly wheelchair-bound but does periodically use a walker.  He presents with low back pain.  He states it was a fairly sudden onset 3 to 4 days ago.  It has been progressively increasing in severity however.  On pointing it is just to the right of L5.  No radicular pattern.  No change in bowel or urinary habits.  No fever sweats or chills.    Past Medical History:  Past Medical History:   Diagnosis Date    Acute UTI (urinary tract infection)     Diabetes mellitus     Hypertension     Renal insufficiency      Past Surgical History:  Past Surgical History:   Procedure Laterality Date    HAND SURGERY Left     FX    OTHER SURGICAL HISTORY Bilateral     multiple surgeries for fx  - both legs    OTHER SURGICAL HISTORY      blaze carotid procedure      Home Meds:  Medications Prior to Admission   Medication Sig Dispense Refill Last Dose/Taking    acetaminophen (TYLENOL) 500 MG tablet Take 2 tablets by mouth At Night As Needed.   6/15/2025    ASPIRIN 81 PO Take 81 mg by mouth Daily.   6/15/2025 Noon    atorvastatin (LIPITOR) 20 MG tablet TAKE 1 TABLET BY MOUTH DAILY 90 tablet 1 6/15/2025 Morning    carvedilol (COREG) 6.25 MG tablet TAKE 1 TABLET BY MOUTH TWICE A DAY WITH A MEAL 180 tablet 1 6/15/2025 Bedtime    coenzyme Q10 100 MG capsule Take 1 capsule by mouth Daily.   6/15/2025 Morning    fluticasone (FLONASE) 50 MCG/ACT nasal spray Administer 2 sprays into the nostril(s) as directed by provider Daily As Needed.   6/15/2025 Morning    Multiple Vitamins-Minerals (MULTIVITAMIN ADULT PO) Take  by mouth Daily.   6/15/2025 Morning    Omega-3 Fatty Acids (fish  oil) 1000 MG capsule capsule Take  by mouth Daily With Breakfast.   6/15/2025 Bedtime    vitamin B-12 (CYANOCOBALAMIN) 1000 MCG tablet Take 1 tablet by mouth Daily.   6/15/2025 Morning    vitamin C (ASCORBIC ACID) 500 MG tablet Take 1 tablet by mouth Daily.   6/15/2025 Evening    furosemide (LASIX) 20 MG tablet Take 1 tablet by mouth Daily.          Allergies:  No Known Allergies  Immunizations:  Immunization History   Administered Date(s) Administered    Arexvy (RSV, Adults 60+ yrs) 2024    COVID-19 (PFIZER) Purple Cap Monovalent 2021, 2021, 2021    Fluad Quad 65+ 2021    Fluzone High-Dose 65+YRS 2025    Fluzone High-Dose 65+yrs 2024    PEDS-Pneumococcal Conjugate (PCV7) 2023    Pneumococcal Conjugate 20-Valent (PCV20) 2023    Tdap 2019     Social History:   Social History     Social History Narrative    Not on file     Social History     Tobacco Use    Smoking status: Former     Current packs/day: 0.00     Average packs/day: 1 pack/day for 23.0 years (23.0 ttl pk-yrs)     Types: Cigarettes     Start date:      Quit date:      Years since quittin.4    Smokeless tobacco: Never   Substance Use Topics    Alcohol use: Not Currently     Family History:  Family History   Problem Relation Age of Onset    Stroke Mother     Heart attack Father     Stroke Brother     Diverticulitis Brother     Hypertension Brother         Review of Systems  Review of Systems   Constitutional: Negative.    HENT: Negative.     Eyes: Negative.    Respiratory: Negative.     Cardiovascular: Negative.    Gastrointestinal: Negative.    Endocrine: Negative.    Genitourinary: Negative.    Musculoskeletal:         As per history of present illness.   Skin: Negative.    Allergic/Immunologic: Negative.    Neurological: Negative.    Hematological: Negative.    Psychiatric/Behavioral: Negative.         Objective:  T Max 24 hrs: Temp (24hrs), Av.3 °F (36.8 °C), Min:98.2 °F (36.8  °C), Max:98.4 °F (36.9 °C)    Vitals Ranges:   Temp:  [98.2 °F (36.8 °C)-98.4 °F (36.9 °C)] 98.2 °F (36.8 °C)  Heart Rate:  [61-73] 66  Resp:  [18] 18  BP: (124-173)/(63-87) 164/78      Exam:  Physical Exam  Constitutional:       General: He is not in acute distress.     Appearance: Normal appearance. He is normal weight. He is not ill-appearing or toxic-appearing.      Comments: He appears uncomfortable.  This appearance is markedly exacerbated by any attempts of moving the patient.   HENT:      Head: Normocephalic and atraumatic.      Right Ear: External ear normal.      Left Ear: External ear normal.      Nose: Nose normal.      Mouth/Throat:      Mouth: Mucous membranes are moist.   Eyes:      General: No scleral icterus.        Right eye: No discharge.         Left eye: No discharge.      Extraocular Movements: Extraocular movements intact.      Conjunctiva/sclera: Conjunctivae normal.   Cardiovascular:      Rate and Rhythm: Normal rate and regular rhythm.      Heart sounds:      No friction rub. No gallop.   Pulmonary:      Effort: Pulmonary effort is normal.      Breath sounds: Normal breath sounds.   Abdominal:      General: Abdomen is flat. Bowel sounds are normal. There is no distension.      Palpations: Abdomen is soft. There is no mass.      Tenderness: There is no abdominal tenderness. There is no guarding or rebound.   Musculoskeletal:      Cervical back: Neck supple.      Right lower leg: No edema.      Left lower leg: No edema.      Comments: L-spine and paraspinal areas nontender.   Skin:     General: Skin is warm and dry.   Neurological:      Mental Status: He is alert and oriented to person, place, and time.   Psychiatric:         Mood and Affect: Mood normal.         Behavior: Behavior normal.         Thought Content: Thought content normal.         Judgment: Judgment normal.         Data Review:  All labs and radiology reviewed.    Assessment:  Low back pain: Pain management.  MRI.   Neurosurgical evaluation.  Hypertension: Continue home meds.  Monitor.  Diabetes 2: Presently does not appear to be on any treatment.  Monitor.  SSI if/when needed.  CKD 3B: Creatinine above baseline.  Gently hydrate overnight.  Monitor.  Avoid nephrotoxins.  Macrocytosis: Check B12, folate, TSH.  Nocturia:  Immobility:      Plan:  Please see above.  Discussed with patient and family members at bedside.  Discussed with ER provider.    Tony Charles MD  6/16/2025  18:44 EDT    EMR Dragon/Transcription disclaimer:   Much of this encounter note is an electronic transcription/translation of spoken language to printed text. The electronic translation of spoken language may permit erroneous, or at times, nonsensical words or phrases to be inadvertently transcribed; Although I have reviewed the note for such errors, some may still exist.

## 2025-06-17 ENCOUNTER — APPOINTMENT (OUTPATIENT)
Dept: MRI IMAGING | Facility: HOSPITAL | Age: 85
End: 2025-06-17
Payer: MEDICARE

## 2025-06-17 LAB
ANION GAP SERPL CALCULATED.3IONS-SCNC: 9 MMOL/L (ref 5–15)
BASOPHILS # BLD AUTO: 0.04 10*3/MM3 (ref 0–0.2)
BASOPHILS NFR BLD AUTO: 0.4 % (ref 0–1.5)
BUN SERPL-MCNC: 25 MG/DL (ref 8–23)
BUN/CREAT SERPL: 15.4 (ref 7–25)
CALCIUM SPEC-SCNC: 8.9 MG/DL (ref 8.6–10.5)
CHLORIDE SERPL-SCNC: 107 MMOL/L (ref 98–107)
CO2 SERPL-SCNC: 23 MMOL/L (ref 22–29)
CREAT SERPL-MCNC: 1.62 MG/DL (ref 0.76–1.27)
DEPRECATED RDW RBC AUTO: 45.2 FL (ref 37–54)
EGFRCR SERPLBLD CKD-EPI 2021: 42.1 ML/MIN/1.73
EOSINOPHIL # BLD AUTO: 0.29 10*3/MM3 (ref 0–0.4)
EOSINOPHIL NFR BLD AUTO: 2.7 % (ref 0.3–6.2)
ERYTHROCYTE [DISTWIDTH] IN BLOOD BY AUTOMATED COUNT: 11.9 % (ref 12.3–15.4)
FOLATE SERPL-MCNC: >20 NG/ML (ref 4.78–24.2)
GLUCOSE SERPL-MCNC: 99 MG/DL (ref 65–99)
HBA1C MFR BLD: 6.3 % (ref 4.8–5.6)
HCT VFR BLD AUTO: 40.5 % (ref 37.5–51)
HGB BLD-MCNC: 13 G/DL (ref 13–17.7)
IMM GRANULOCYTES # BLD AUTO: 0.03 10*3/MM3 (ref 0–0.05)
IMM GRANULOCYTES NFR BLD AUTO: 0.3 % (ref 0–0.5)
LYMPHOCYTES # BLD AUTO: 2.86 10*3/MM3 (ref 0.7–3.1)
LYMPHOCYTES NFR BLD AUTO: 27.1 % (ref 19.6–45.3)
MCH RBC QN AUTO: 32.7 PG (ref 26.6–33)
MCHC RBC AUTO-ENTMCNC: 32.1 G/DL (ref 31.5–35.7)
MCV RBC AUTO: 102 FL (ref 79–97)
MONOCYTES # BLD AUTO: 0.98 10*3/MM3 (ref 0.1–0.9)
MONOCYTES NFR BLD AUTO: 9.3 % (ref 5–12)
NEUTROPHILS NFR BLD AUTO: 6.36 10*3/MM3 (ref 1.7–7)
NEUTROPHILS NFR BLD AUTO: 60.2 % (ref 42.7–76)
NRBC BLD AUTO-RTO: 0 /100 WBC (ref 0–0.2)
PLATELET # BLD AUTO: 222 10*3/MM3 (ref 140–450)
PMV BLD AUTO: 10.7 FL (ref 6–12)
POTASSIUM SERPL-SCNC: 4.1 MMOL/L (ref 3.5–5.2)
RBC # BLD AUTO: 3.97 10*6/MM3 (ref 4.14–5.8)
SODIUM SERPL-SCNC: 139 MMOL/L (ref 136–145)
TSH SERPL DL<=0.05 MIU/L-ACNC: 1.87 UIU/ML (ref 0.27–4.2)
VIT B12 BLD-MCNC: >2000 PG/ML (ref 211–946)
WBC NRBC COR # BLD AUTO: 10.56 10*3/MM3 (ref 3.4–10.8)

## 2025-06-17 PROCEDURE — 82607 VITAMIN B-12: CPT | Performed by: HOSPITALIST

## 2025-06-17 PROCEDURE — G0378 HOSPITAL OBSERVATION PER HR: HCPCS

## 2025-06-17 PROCEDURE — 85025 COMPLETE CBC W/AUTO DIFF WBC: CPT | Performed by: HOSPITALIST

## 2025-06-17 PROCEDURE — 83036 HEMOGLOBIN GLYCOSYLATED A1C: CPT | Performed by: HOSPITALIST

## 2025-06-17 PROCEDURE — 72148 MRI LUMBAR SPINE W/O DYE: CPT

## 2025-06-17 PROCEDURE — 80048 BASIC METABOLIC PNL TOTAL CA: CPT | Performed by: HOSPITALIST

## 2025-06-17 PROCEDURE — 84443 ASSAY THYROID STIM HORMONE: CPT | Performed by: HOSPITALIST

## 2025-06-17 PROCEDURE — 76014 MR SFTY IMPLT&/FB ASMT STF 1: CPT

## 2025-06-17 PROCEDURE — 82746 ASSAY OF FOLIC ACID SERUM: CPT | Performed by: HOSPITALIST

## 2025-06-17 PROCEDURE — 72146 MRI CHEST SPINE W/O DYE: CPT

## 2025-06-17 PROCEDURE — 96361 HYDRATE IV INFUSION ADD-ON: CPT

## 2025-06-17 RX ADMIN — CARVEDILOL 6.25 MG: 6.25 TABLET, FILM COATED ORAL at 17:37

## 2025-06-17 RX ADMIN — Medication 1000 MCG: at 08:37

## 2025-06-17 RX ADMIN — Medication 1 TABLET: at 08:38

## 2025-06-17 RX ADMIN — Medication 10 ML: at 20:30

## 2025-06-17 RX ADMIN — HYDROCODONE BITARTRATE AND ACETAMINOPHEN 1 TABLET: 5; 325 TABLET ORAL at 00:57

## 2025-06-17 RX ADMIN — OXYCODONE HYDROCHLORIDE AND ACETAMINOPHEN 500 MG: 500 TABLET ORAL at 08:38

## 2025-06-17 RX ADMIN — ATORVASTATIN CALCIUM 20 MG: 20 TABLET, FILM COATED ORAL at 08:38

## 2025-06-17 RX ADMIN — ASPIRIN 81 MG: 81 TABLET, COATED ORAL at 08:40

## 2025-06-17 RX ADMIN — CARVEDILOL 6.25 MG: 6.25 TABLET, FILM COATED ORAL at 08:38

## 2025-06-17 RX ADMIN — Medication 2.5 MG: at 20:30

## 2025-06-17 RX ADMIN — Medication 10 ML: at 08:40

## 2025-06-17 RX ADMIN — LIDOCAINE 1 PATCH: 4 PATCH TOPICAL at 08:40

## 2025-06-17 NOTE — PROGRESS NOTES
[facility]     LOS: 0 days     Subjective:    CC:Back Pain and Weakness - Generalized    DIET:  Diet Order   Procedures    Diet: Regular/House, Cardiac; Healthy Heart (2-3 Na+); Fluid Consistency: Thin (IDDSI 0)       Objective:    Vital Signs:  Temp:  [97.5 °F (36.4 °C)-98.2 °F (36.8 °C)] 98.2 °F (36.8 °C)  Heart Rate:  [61-76] 65  Resp:  [16-18] 16  BP: (153-187)/(52-90) 180/77    SpO2:  [96 %-97 %] 97 %  on   ;   Device (Oxygen Therapy): room air  Body mass index is 25.11 kg/m².    Physical Exam  General awake alert answering questions appropriately  Heart regular rate rhythm no murmurs rubs gallops  Lungs clear to auscultation bilaterally no wheeze rhonchi  Abdomen soft and tenderness in area mainly  Extremities no sinus clubbing or new    Results Review:    Glucose   Date Value Ref Range Status   06/17/2025 99 65 - 99 mg/dL Final   06/16/2025 141 (H) 65 - 99 mg/dL Final     Results from last 7 days   Lab Units 06/17/25  0440   WBC 10*3/mm3 10.56   HEMOGLOBIN g/dL 13.0   HEMATOCRIT % 40.5   PLATELETS 10*3/mm3 222     Results from last 7 days   Lab Units 06/17/25  0440 06/16/25  1004   SODIUM mmol/L 139 141   POTASSIUM mmol/L 4.1 4.9   CHLORIDE mmol/L 107 105   CO2 mmol/L 23.0 22.7   BUN mg/dL 25.0* 25.0*   CREATININE mg/dL 1.62* 2.07*   CALCIUM mg/dL 8.9 9.8   BILIRUBIN mg/dL  --  0.7   ALK PHOS U/L  --  97   ALT (SGPT) U/L  --  32   AST (SGOT) U/L  --  15   GLUCOSE mg/dL 99 141*                 Cultures:  No results found for: \"BLOODCX\", \"URINECX\", \"WOUNDCX\", \"MRSACX\", \"RESPCX\", \"STOOLCX\"    I have reviewed daily medications and changes in CPOE    Scheduled meds  vitamin C, 500 mg, Oral, Daily  aspirin, 81 mg, Oral, Daily  atorvastatin, 20 mg, Oral, Daily  carvedilol, 6.25 mg, Oral, BID With Meals  Lidocaine, 1 patch, Transdermal, Q24H  multivitamin with minerals, 1 tablet, Oral, Daily  sodium chloride, 10 mL, Intravenous, Q12H  vitamin B-12, 1,000 mcg, Oral, Daily           PRN " meds    acetaminophen **OR** acetaminophen **OR** acetaminophen    senna-docusate sodium **AND** polyethylene glycol **AND** bisacodyl **AND** bisacodyl    HYDROcodone-acetaminophen    ondansetron ODT **OR** ondansetron    sodium chloride    sodium chloride        Lumbar back pain        Assessment/Plan:  Low back pain  - MRI  - Neurosurgery    Hypertension  - Coreg  - Blood pressure controlled    Type 2 diabetes  - Blood sugars controlled, on any medication  - A1c 6.3    Macrocytosis  - TSH is normal at 1.8  -Folic acid and B12 both above upper limits    DVT PPX: SCDs and aspirin      Brenton Carcamo MD  06/17/25  18:29 EDT

## 2025-06-17 NOTE — PLAN OF CARE
Goal Outcome Evaluation:         Ot a/ox4 vss no c/o pain pt lying in bed no other concerns as present call light In reach plan of care on going

## 2025-06-17 NOTE — NURSING NOTE
Plan of care reviewed with patient ,he is alert and oriented x4 . He is planned for MRI brain today

## 2025-06-18 PROCEDURE — 97162 PT EVAL MOD COMPLEX 30 MIN: CPT

## 2025-06-18 PROCEDURE — 99215 OFFICE O/P EST HI 40 MIN: CPT | Performed by: NURSE PRACTITIONER

## 2025-06-18 PROCEDURE — G0378 HOSPITAL OBSERVATION PER HR: HCPCS

## 2025-06-18 PROCEDURE — 97110 THERAPEUTIC EXERCISES: CPT

## 2025-06-18 RX ADMIN — CARVEDILOL 6.25 MG: 6.25 TABLET, FILM COATED ORAL at 18:13

## 2025-06-18 RX ADMIN — Medication 10 ML: at 21:15

## 2025-06-18 RX ADMIN — LIDOCAINE 1 PATCH: 4 PATCH TOPICAL at 08:54

## 2025-06-18 RX ADMIN — ATORVASTATIN CALCIUM 20 MG: 20 TABLET, FILM COATED ORAL at 08:54

## 2025-06-18 RX ADMIN — Medication 1 TABLET: at 08:54

## 2025-06-18 RX ADMIN — OXYCODONE HYDROCHLORIDE AND ACETAMINOPHEN 500 MG: 500 TABLET ORAL at 08:54

## 2025-06-18 RX ADMIN — HYDROCODONE BITARTRATE AND ACETAMINOPHEN 1 TABLET: 5; 325 TABLET ORAL at 21:15

## 2025-06-18 RX ADMIN — Medication 1000 MCG: at 08:54

## 2025-06-18 RX ADMIN — CARVEDILOL 6.25 MG: 6.25 TABLET, FILM COATED ORAL at 08:54

## 2025-06-18 RX ADMIN — ASPIRIN 81 MG: 81 TABLET, COATED ORAL at 08:54

## 2025-06-18 NOTE — PLAN OF CARE
Goal Outcome Evaluation:  Plan of Care Reviewed With: patient           Outcome Evaluation: Pt 85 yo male presented to ED w low back pain PMH CVA, DM, HTN. Pt reports baseline he lives at home w brother however he works. Pt reports baseline he is non ambulatory uses WC and requires physical assist for transfers. on PT exam pt required mod/max A bed mobility log rolling, MAx A to stand w walker. Pt w gross weakness , limited LUE shoulder movement and RUE hand tone. would recommend SNU/ECF at [place] vs home w [facility] however pt requires 24 hr assist due to  physical baseline limitiations. awaiting ISAIAH recommendations.    Anticipated Discharge Disposition (PT): home with 24/7 care, home with home health, skilled nursing facility, extended care facility

## 2025-06-18 NOTE — CONSULTS
[facility] NEUROSURGERY CONSULT NOTE    Patient name: Benja Macedo  Referring Provider: Dr. Charles  Reason for Consultation: Low back pain    Patient Care Team:  Yo Mckeon, ANN MARIE, APRN as PCP - General (Internal Medicine)  Luz Marina Solano MD as Consulting Physician (Nephrology)    Chief complaint: Low back muscle spasms    Subjective .     History of present illness:    Patient is a 84 y.o.  male with medical history of DM, HTN, previous stroke in .  Patient reports low back pain that he describes as muscle spasms that started on Wednesday of last week and increased on .  He denies injury or trauma.  He is mostly wheelchair-bound due to the prior stroke.  He denies radiation of pain into his legs, numbness/tingling to lower extremities, loss of bowel or bladder function or saddle paresthesia.  No previous spine surgeries, not anticoagulated and no history of cancer.      History  PAST MEDICAL HISTORY  Past Medical History:   Diagnosis Date    Acute UTI (urinary tract infection)     Diabetes mellitus     Hypertension     Renal insufficiency        PAST SURGICAL HISTORY  Past Surgical History:   Procedure Laterality Date    HAND SURGERY Left     FX    OTHER SURGICAL HISTORY Bilateral     multiple surgeries for fx  - both legs    OTHER SURGICAL HISTORY      blaze carotid procedure       FAMILY HISTORY  Family History   Problem Relation Age of Onset    Stroke Mother     Heart attack Father     Stroke Brother     Diverticulitis Brother     Hypertension Brother        SOCIAL HISTORY  Social History     Tobacco Use    Smoking status: Former     Current packs/day: 0.00     Average packs/day: 1 pack/day for 23.0 years (23.0 ttl pk-yrs)     Types: Cigarettes     Start date:      Quit date:      Years since quittin.4    Smokeless tobacco: Never   Vaping Use    Vaping status: Never Used   Substance Use Topics    Alcohol use: Not Currently    Drug use: Never       Allergies:  Patient has no  known allergies.    MEDICATIONS:  Medications Prior to Admission   Medication Sig Dispense Refill Last Dose/Taking    acetaminophen (TYLENOL) 500 MG tablet Take 2 tablets by mouth At Night As Needed.   6/15/2025    ASPIRIN 81 PO Take 81 mg by mouth Daily.   6/15/2025 Noon    atorvastatin (LIPITOR) 20 MG tablet TAKE 1 TABLET BY MOUTH DAILY 90 tablet 1 6/15/2025 Morning    carvedilol (COREG) 6.25 MG tablet TAKE 1 TABLET BY MOUTH TWICE A DAY WITH A MEAL 180 tablet 1 6/15/2025 Bedtime    coenzyme Q10 100 MG capsule Take 1 capsule by mouth Daily.   6/15/2025 Morning    fluticasone (FLONASE) 50 MCG/ACT nasal spray Administer 2 sprays into the nostril(s) as directed by provider Daily As Needed.   6/15/2025 Morning    Multiple Vitamins-Minerals (MULTIVITAMIN ADULT PO) Take  by mouth Daily.   6/15/2025 Morning    Omega-3 Fatty Acids (fish oil) 1000 MG capsule capsule Take  by mouth Daily With Breakfast.   6/15/2025 Bedtime    vitamin B-12 (CYANOCOBALAMIN) 1000 MCG tablet Take 1 tablet by mouth Daily.   6/15/2025 Morning    vitamin C (ASCORBIC ACID) 500 MG tablet Take 1 tablet by mouth Daily.   6/15/2025 Evening     Current Facility-Administered Medications:     acetaminophen (TYLENOL) tablet 650 mg, 650 mg, Oral, Q4H PRN **OR** acetaminophen (TYLENOL) 160 MG/5ML oral solution 650 mg, 650 mg, Oral, Q4H PRN **OR** acetaminophen (TYLENOL) suppository 650 mg, 650 mg, Rectal, Q4H PRN, Tony Charles MD    ascorbic acid (VITAMIN C) tablet 500 mg, 500 mg, Oral, Daily, Tony Charles MD, 500 mg at 06/18/25 0854    aspirin EC tablet 81 mg, 81 mg, Oral, Daily, Tony Charles MD, 81 mg at 06/18/25 0854    atorvastatin (LIPITOR) tablet 20 mg, 20 mg, Oral, Daily, Tony Charles MD, 20 mg at 06/18/25 0854    sennosides-docusate (PERICOLACE) 8.6-50 MG per tablet 2 tablet, 2 tablet, Oral, BID PRN **AND** polyethylene glycol (MIRALAX) packet 17 g, 17 g, Oral, Daily PRN **AND** bisacodyl (DULCOLAX) EC tablet 5 mg, 5  mg, Oral, Daily PRN **AND** bisacodyl (DULCOLAX) suppository 10 mg, 10 mg, Rectal, Daily PRN, Tony Charles MD    carvedilol (COREG) tablet 6.25 mg, 6.25 mg, Oral, BID With Meals, Tony Charles MD, 6.25 mg at 06/18/25 0854    HYDROcodone-acetaminophen (NORCO) 5-325 MG per tablet 1 tablet, 1 tablet, Oral, Q4H PRN, Tony Charles MD, 1 tablet at 06/17/25 0057    Lidocaine 4 % 1 patch, 1 patch, Transdermal, Q24H, Tony Charles MD, 1 patch at 06/18/25 0854    multivitamin with minerals 1 tablet, 1 tablet, Oral, Daily, Tony Charles MD, 1 tablet at 06/18/25 0854    ondansetron ODT (ZOFRAN-ODT) disintegrating tablet 4 mg, 4 mg, Oral, Q6H PRN **OR** ondansetron (ZOFRAN) injection 4 mg, 4 mg, Intravenous, Q6H PRN, Tony Charles MD    sodium chloride 0.9 % flush 10 mL, 10 mL, Intravenous, Q12H, Tony Charles MD, 10 mL at 06/17/25 2030    sodium chloride 0.9 % flush 10 mL, 10 mL, Intravenous, PRN, Tony Charles MD    sodium chloride 0.9 % infusion 40 mL, 40 mL, Intravenous, PRN, Tony Charles MD    vitamin B-12 (CYANOCOBALAMIN) tablet 1,000 mcg, 1,000 mcg, Oral, Daily, Tony Charles MD, 1,000 mcg at 06/18/25 0854      COMORBID CONDITIONS:  Diabetes Type 2 and Hypertension      Review of Systems  Review of Systems   Constitutional:  Negative for chills and fever.   Gastrointestinal:         Denies bowel incontinence   Genitourinary:  Negative for difficulty urinating.        Denies urinary incontinence   Musculoskeletal:  Positive for back pain and gait problem (Chronic). Negative for neck pain.   Skin:  Negative for color change.   Neurological:  Positive for weakness (Right sided). Negative for numbness.        Denies saddle paresthesia   Psychiatric/Behavioral:  The patient is not nervous/anxious.      Objective     Physical Exam  Physical Exam  Vitals reviewed.   Constitutional:       Appearance: Normal appearance.   Pulmonary:      Effort: Pulmonary  effort is normal.   Musculoskeletal:      Cervical back: Normal.      Thoracic back: Normal.      Lumbar back: Normal. Negative right straight leg raise test and negative left straight leg raise test.   Skin:     General: Skin is warm and dry.   Neurological:      Motor: Motor strength is normal.     Deep Tendon Reflexes:      Reflex Scores:       Tricep reflexes are 2+ on the right side and 2+ on the left side.       Bicep reflexes are 2+ on the right side and 2+ on the left side.       Brachioradialis reflexes are 2+ on the right side and 2+ on the left side.       Patellar reflexes are 1+ on the right side and 1+ on the left side.       Achilles reflexes are 1+ on the right side and 1+ on the left side.      Neurological Exam  Mental Status  Awake, alert and oriented to person, place and time.    Motor  Normal muscle bulk throughout. Normal muscle tone. Strength is 5/5 throughout all four extremities.    Sensory  Sensation is intact to light touch, pinprick, vibration and proprioception in all four extremities.    Reflexes                                            Right                      Left  Brachioradialis                    2+                         2+  Biceps                                 2+                         2+  Triceps                                2+                         2+  Patellar                                1+                         1+  Achilles                                1+                         1+    Right pathological reflexes: Anant's absent. Ankle clonus absent.  Left pathological reflexes: Anant's absent. Ankle clonus absent.    Gait    Gait not tested due to fall risk.        Results Review:    LABS:    Admission on 06/16/2025   Component Date Value Ref Range Status    Glucose 06/16/2025 141 (H)  65 - 99 mg/dL Final    BUN 06/16/2025 25.0 (H)  8.0 - 23.0 mg/dL Final    Creatinine 06/16/2025 2.07 (H)  0.76 - 1.27 mg/dL Final    Sodium 06/16/2025 141  136 - 145  mmol/L Final    Potassium 06/16/2025 4.9  3.5 - 5.2 mmol/L Final    Chloride 06/16/2025 105  98 - 107 mmol/L Final    CO2 06/16/2025 22.7  22.0 - 29.0 mmol/L Final    Calcium 06/16/2025 9.8  8.6 - 10.5 mg/dL Final    Total Protein 06/16/2025 7.4  6.0 - 8.5 g/dL Final    Albumin 06/16/2025 4.2  3.5 - 5.2 g/dL Final    ALT (SGPT) 06/16/2025 32  1 - 41 U/L Final    AST (SGOT) 06/16/2025 15  1 - 40 U/L Final    Alkaline Phosphatase 06/16/2025 97  39 - 117 U/L Final    Total Bilirubin 06/16/2025 0.7  0.0 - 1.2 mg/dL Final    Globulin 06/16/2025 3.2  gm/dL Final    A/G Ratio 06/16/2025 1.3  g/dL Final    BUN/Creatinine Ratio 06/16/2025 12.1  7.0 - 25.0 Final    Anion Gap 06/16/2025 13.3  5.0 - 15.0 mmol/L Final    eGFR 06/16/2025 31.4 (L)  >60.0 mL/min/1.73 Final    WBC 06/16/2025 10.69  3.40 - 10.80 10*3/mm3 Final    RBC 06/16/2025 4.57  4.14 - 5.80 10*6/mm3 Final    Hemoglobin 06/16/2025 15.5  13.0 - 17.7 g/dL Final    Hematocrit 06/16/2025 45.8  37.5 - 51.0 % Final    MCV 06/16/2025 100.2 (H)  79.0 - 97.0 fL Final    MCH 06/16/2025 33.9 (H)  26.6 - 33.0 pg Final    MCHC 06/16/2025 33.8  31.5 - 35.7 g/dL Final    RDW 06/16/2025 12.1 (L)  12.3 - 15.4 % Final    RDW-SD 06/16/2025 44.9  37.0 - 54.0 fl Final    MPV 06/16/2025 10.4  6.0 - 12.0 fL Final    Platelets 06/16/2025 237  140 - 450 10*3/mm3 Final    Neutrophil % 06/16/2025 75.9  42.7 - 76.0 % Final    Lymphocyte % 06/16/2025 16.9 (L)  19.6 - 45.3 % Final    Monocyte % 06/16/2025 5.3  5.0 - 12.0 % Final    Eosinophil % 06/16/2025 1.4  0.3 - 6.2 % Final    Basophil % 06/16/2025 0.2  0.0 - 1.5 % Final    Immature Grans % 06/16/2025 0.3  0.0 - 0.5 % Final    Neutrophils, Absolute 06/16/2025 8.11 (H)  1.70 - 7.00 10*3/mm3 Final    Lymphocytes, Absolute 06/16/2025 1.81  0.70 - 3.10 10*3/mm3 Final    Monocytes, Absolute 06/16/2025 0.57  0.10 - 0.90 10*3/mm3 Final    Eosinophils, Absolute 06/16/2025 0.15  0.00 - 0.40 10*3/mm3 Final    Basophils, Absolute 06/16/2025 0.02   0.00 - 0.20 10*3/mm3 Final    Immature Grans, Absolute 06/16/2025 0.03  0.00 - 0.05 10*3/mm3 Final    nRBC 06/16/2025 0.0  0.0 - 0.2 /100 WBC Final    Glucose 06/17/2025 99  65 - 99 mg/dL Final    BUN 06/17/2025 25.0 (H)  8.0 - 23.0 mg/dL Final    Creatinine 06/17/2025 1.62 (H)  0.76 - 1.27 mg/dL Final    Sodium 06/17/2025 139  136 - 145 mmol/L Final    Potassium 06/17/2025 4.1  3.5 - 5.2 mmol/L Final    Chloride 06/17/2025 107  98 - 107 mmol/L Final    CO2 06/17/2025 23.0  22.0 - 29.0 mmol/L Final    Calcium 06/17/2025 8.9  8.6 - 10.5 mg/dL Final    BUN/Creatinine Ratio 06/17/2025 15.4  7.0 - 25.0 Final    Anion Gap 06/17/2025 9.0  5.0 - 15.0 mmol/L Final    eGFR 06/17/2025 42.1 (L)  >60.0 mL/min/1.73 Final    WBC 06/17/2025 10.56  3.40 - 10.80 10*3/mm3 Final    RBC 06/17/2025 3.97 (L)  4.14 - 5.80 10*6/mm3 Final    Hemoglobin 06/17/2025 13.0  13.0 - 17.7 g/dL Final    Hematocrit 06/17/2025 40.5  37.5 - 51.0 % Final    MCV 06/17/2025 102.0 (H)  79.0 - 97.0 fL Final    MCH 06/17/2025 32.7  26.6 - 33.0 pg Final    MCHC 06/17/2025 32.1  31.5 - 35.7 g/dL Final    RDW 06/17/2025 11.9 (L)  12.3 - 15.4 % Final    RDW-SD 06/17/2025 45.2  37.0 - 54.0 fl Final    MPV 06/17/2025 10.7  6.0 - 12.0 fL Final    Platelets 06/17/2025 222  140 - 450 10*3/mm3 Final    Neutrophil % 06/17/2025 60.2  42.7 - 76.0 % Final    Lymphocyte % 06/17/2025 27.1  19.6 - 45.3 % Final    Monocyte % 06/17/2025 9.3  5.0 - 12.0 % Final    Eosinophil % 06/17/2025 2.7  0.3 - 6.2 % Final    Basophil % 06/17/2025 0.4  0.0 - 1.5 % Final    Immature Grans % 06/17/2025 0.3  0.0 - 0.5 % Final    Neutrophils, Absolute 06/17/2025 6.36  1.70 - 7.00 10*3/mm3 Final    Lymphocytes, Absolute 06/17/2025 2.86  0.70 - 3.10 10*3/mm3 Final    Monocytes, Absolute 06/17/2025 0.98 (H)  0.10 - 0.90 10*3/mm3 Final    Eosinophils, Absolute 06/17/2025 0.29  0.00 - 0.40 10*3/mm3 Final    Basophils, Absolute 06/17/2025 0.04  0.00 - 0.20 10*3/mm3 Final    Immature Grans, Absolute  06/17/2025 0.03  0.00 - 0.05 10*3/mm3 Final    nRBC 06/17/2025 0.0  0.0 - 0.2 /100 WBC Final    Vitamin B-12 06/17/2025 >2,000 (H)  211 - 946 pg/mL Final    Folate 06/17/2025 >20.00  4.78 - 24.20 ng/mL Final    TSH 06/17/2025 1.870  0.270 - 4.200 uIU/mL Final    Hemoglobin A1C 06/17/2025 6.30 (H)  4.80 - 5.60 % Final       DIAGNOSTICS:  MRI thoracic and lumbar spine:  Mild thoracic spondylosis at T10-11.  No acute abnormalities seen.  Degenerative changes throughout the lumbar spine, no acute findings    Results Review:   I reviewed the patient's new clinical results.  I personally viewed  the patient's chart/imaging, it was also discussed with and reviewed by Dr. Moreno    Vital Signs   Temp:  [97.7 °F (36.5 °C)-98.2 °F (36.8 °C)] 97.7 °F (36.5 °C)  Heart Rate:  [61-66] 62  Resp:  [16-18] 18  BP: (152-180)/(59-77) 152/59      Assessment & Plan       Lumbar back pain      Problem List Items Addressed This Visit       * (Principal) Lumbar back pain - Primary     Other Visit Diagnoses         Generalized weakness          Difficulty walking          Chronic kidney disease, unspecified CKD stage               84-year-old male who presents with low back muscle spasms.  He reports improvement of his symptoms currently.  MRI thoracic and lumbar spine did not show any acute abnormalities, discussed this with patient and family at bedside.  No further recommendations from neurosurgery at this time.  We will sign off, please feel free to call with any questions or concerns.      PLAN:   -ISAIAH to sign off    I discussed the patient's findings and my recommendations with patient, family, and Dr Moreno    During patient visit, I utilized appropriate personal protective equipment including gloves and mask.  Mask used was standard procedure mask. Appropriate PPE was worn during the entire visit.  Hand hygiene was completed before and after.     I spent 40 minutes caring for Benja Macedo on this date of service. This time  "includes time spent by me in the following activities: preparing for the visit, reviewing tests, obtaining and/or reviewing a separately obtained history, performing a medically appropriate examination and/or evaluation, counseling and educating the patient/family/caregiver, ordering medications, tests, or procedures, referring and communicating with other health care professionals, documenting information in the medical record, independently interpreting results and communicating that information with the patient/family/caregiver, and care coordination         Tete Layne, APRN  06/18/25  07:46 EDT    \"Dictated utilizing Dragon dictation\".      "

## 2025-06-18 NOTE — PLAN OF CARE
Goal Outcome Evaluation:   Patient awake alert and oriented,plan of care reviewed with  the patient and verbalized understanding.He is ready to be discharged today after neurosurgery consult

## 2025-06-18 NOTE — THERAPY EVALUATION
Acute Care - Physical Therapy Initial Evaluation  [facility]     Patient Name: Benja Macedo  : 1940  MRN: [id number]  Today's Date: 2025   Onset of Illness/Injury or Date of Surgery: 25  Visit Dx:     ICD-10-CM ICD-9-CM   1. Lumbar back pain  M54.50 724.2   2. Generalized weakness  R53.1 780.79   3. Difficulty walking  R26.2 719.7   4. Chronic kidney disease, unspecified CKD stage  N18.9 585.9     Patient Active Problem List   Diagnosis    Benign essential hypertension    Gastroesophageal reflux disease    Hyperglycemia    Hyperlipidemia    Nocturia    Renal insufficiency    Cerebrovascular accident - history of    Type 2 diabetes mellitus    Benign hypertension with chronic kidney disease    Stage 3b chronic kidney disease    Edema of lower extremity    Dyslipidemia    History of cerebrovascular accident    Pain of left shoulder joint on movement    Weakness    UTI (urinary tract infection)    Acute UTI (urinary tract infection)    Nodule of upper lobe of left lung    Abnormal CT of the abdomen    Abnormal CT of the head    Vitamin D deficiency    Lumbar back pain     Past Medical History:   Diagnosis Date    Acute UTI (urinary tract infection)     Diabetes mellitus     Hypertension     Renal insufficiency      Past Surgical History:   Procedure Laterality Date    HAND SURGERY Left     FX    OTHER SURGICAL HISTORY Bilateral     multiple surgeries for fx  - both legs    OTHER SURGICAL HISTORY      blaze carotid procedure     PT Assessment (Last 12 Hours)       PT Evaluation and Treatment       Row Name 25 1600          Physical Therapy Time and Intention    Subjective Information no complaints  -LH     Document Type evaluation  -LH     Mode of Treatment individual therapy;physical therapy  -LH     Patient Effort fair  -LH     Symptoms Noted During/After Treatment none  -LH       Row Name 25 1600          General Information    Patient Profile Reviewed yes  -LH     Onset of  Illness/Injury or Date of Surgery 06/16/25  -     Patient Observations alert;cooperative;agree to therapy  -     General Observations of Patient pt supine in bed no acute distress  -     Prior Level of Function min assist:;w/c or scooter;ADL's;bed mobility  -     Equipment Currently Used at Home wheelchair  -     Pertinent History of Current Functional Problem LBP  -     Existing Precautions/Restrictions fall;spinal  -     Benefits Reviewed patient:  -[facility] Name 06/18/25 1600          Living Environment    Current Living Arrangements home  -     People in Home sibling(s)  -[facility] Name 06/18/25 1600          Pain    Pretreatment Pain Rating 0/10 - no pain  -     Posttreatment Pain Rating 0/10 - no pain  -[facility] Name 06/18/25 1600          Cognition    Affect/Mental Status (Cognition) WFL;flat/blunted affect  -[facility] Name 06/18/25 1600          Range of Motion Comprehensive    Comment, General Range of Motion BLEs appear to have increased tone WFLs  -[facility] Name 06/18/25 1600          Strength Comprehensive (MMT)    Comment, General Manual Muscle Testing (MMT) Assessment LEs appear at least 3/5 not formally assessed  -[facility] Name 06/18/25 1600          Bed Mobility    Bed Mobility supine-sit;sit-supine  -     Supine-Sit [place] (Bed Mobility) moderate assist (50% patient effort);set up;verbal cues;nonverbal cues (demo/gesture)  -     Sit-Supine [place] (Bed Mobility) maximum assist (25% patient effort);set up;verbal cues;nonverbal cues (demo/gesture)  -     Assistive Device (Bed Mobility) bed rails  -     Comment, (Bed Mobility) log rolling  -LH       Row Name 06/18/25 1600          Transfers    Transfers sit-stand transfer;stand-sit transfer  -LH       Row Name 06/18/25 1600          Sit-Stand Transfer    Sit-Stand [place] (Transfers) maximum assist (25% patient effort)  -     Assistive Device (Sit-Stand Transfers) walker, front-wheeled   -       Row Name 06/18/25 1600          Stand-Sit Transfer    Stand-Sit [place] (Transfers) maximum assist (25% patient effort)  -     Assistive Device (Stand-Sit Transfers) walker, front-wheeled  -[facility] Name 06/18/25 1600          Gait/Stairs (Locomotion)    [place] Level (Gait) moderate assist (50% patient effort)  -     Assistive Device (Gait) walker, front-wheeled  -     Distance in Feet (Gait) --  2 side steps  -     Pattern (Gait) step-to  -     Deviations/Abnormal Patterns (Gait) bilateral deviations;kamini decreased;base of support, wide;weight shifting decreased  -     Bilateral Gait Deviations forward flexed posture;heel strike decreased  -[facility] Name 06/18/25 1600          Balance    Balance Assessment sitting static balance;standing static balance  -     Static Sitting Balance supervision  -     Position, Sitting Balance unsupported;sitting edge of bed  -     Static Standing Balance contact guard  -     Position/Device Used, Standing Balance supported;walker, front-wheeled  -[facility] Name 06/18/25 1600          Motor Skills    Therapeutic Exercise --  APs x 10  -[facility] Name 06/18/25 1600          Plan of Care Review    Plan of Care Reviewed With patient  -     Outcome Evaluation Pt 83 yo male presented to ED w low back pain PMH CVA, DM, HTN. Pt reports baseline he lives at home w brother however he works. Pt reports baseline he is non ambulatory uses  and requires physical assist for transfers. on PT exam pt required mod/max A bed mobility log rolling, MAx A to stand w walker. Pt w gross weakness , limited LUE shoulder movement and RUE hand tone. would recommend SNU/ECF at [place] vs home w [facility] however pt requires 24 hr assist due to  physical baseline limitiations. awaiting ISAIAH recommendations.  -       Row Name 06/18/25 1600          Positioning and Restraints    Pre-Treatment Position in bed  -LH     Post Treatment Position bed  -     In Bed  supine;call light within reach;encouraged to call for assist;exit alarm on;notified nsg  -       Row Name 06/18/25 1600          Therapy Assessment/Plan (PT)    Rehab Potential (PT) limited  -     Criteria for Skilled Interventions Met (PT) yes  -     Therapy Frequency (PT) 3 times/wk  -       Row Name 06/18/25 1600          PT Evaluation Complexity    History, PT Evaluation Complexity 1-2 personal factors and/or comorbidities  -     Examination of Body Systems (PT Eval Complexity) total of 3 or more elements  -     Clinical Presentation (PT Evaluation Complexity) evolving  -     Clinical Decision Making (PT Evaluation Complexity) moderate complexity  -     Overall Complexity (PT Evaluation Complexity) moderate complexity  -[facility] Name 06/18/25 1600          Physical Therapy Goals    Bed Mobility Goal Selection (PT) bed mobility, PT goal 1  -     Transfer Goal Selection (PT) transfer, PT goal 1  -[facility] Name 06/18/25 1600          Bed Mobility Goal 1 (PT)    Activity/Assistive Device (Bed Mobility Goal 1, PT) bed mobility activities, all  -     [place] Level/Cues Needed (Bed Mobility Goal 1, PT) moderate assist (50-74% patient effort)  -     Time Frame (Bed Mobility Goal 1, PT) 2 weeks  -[facility] Name 06/18/25 1600          Transfer Goal 1 (PT)    Activity/Assistive Device (Transfer Goal 1, PT) sit-to-stand/stand-to-sit;bed-to-chair/chair-to-bed;walker, rolling  -     [place] Level/Cues Needed (Transfer Goal 1, PT) moderate assist (50-74% patient effort)  -     Time Frame (Transfer Goal 1, PT) 2 weeks  -               User Key  (r) = Recorded By, (t) = Taken By, (c) = Cosigned By      Initials Name Provider Type     Angy Curiel, PT Physical Therapist                    Physical Therapy Education       Title: PT OT SLP Therapies (In Progress)       Topic: Physical Therapy (In Progress)       Point: Mobility training (In Progress)       Learning Progress  Summary            Patient Acceptance, E, NR by  at 6/18/2025 1637                      Point: Home exercise program (In Progress)       Learning Progress Summary            Patient Acceptance, E, NR by  at 6/18/2025 1637                      Point: Body mechanics (In Progress)       Learning Progress Summary            Patient Acceptance, E, NR by  at 6/18/2025 1637                      Point: Precautions (In Progress)       Learning Progress Summary            Patient Acceptance, E, NR by  at 6/18/2025 1637                                      User Key       Initials Effective Dates Name Provider Type Discipline     06/16/21 -  Angy Curiel, PT Physical Therapist PT                  PT Recommendation and Plan  Anticipated Discharge Disposition (PT): home with 24/7 care, home with home health, skilled nursing facility, extended care facility  Planned Therapy Interventions (PT): balance training, bed mobility training, gait training, home exercise program, ROM (range of motion), stair training, stretching, strengthening, transfer training, wheelchair management/propulsion training  Therapy Frequency (PT): 3 times/wk  Plan of Care Reviewed With: patient  Outcome Evaluation: Pt 85 yo male presented to ED w low back pain PMH CVA, DM, HTN. Pt reports baseline he lives at home w brother however he works. Pt reports baseline he is non ambulatory uses  and requires physical assist for transfers. on PT exam pt required mod/max A bed mobility log rolling, MAx A to stand w walker. Pt w gross weakness , limited LUE shoulder movement and RUE hand tone. would recommend [place]F at [place] vs home w [facility] however pt requires 24 hr assist due to  physical baseline limitiations. awaiting ISAIAH recommendations.   Outcome Measures       Row Name 06/18/25 1600             How much help from another person do you currently need...    Turning from your back to your side while in flat bed without using bedrails? 2  -      Moving  from lying on back to sitting on the side of a flat bed without bedrails? 2  -LH      Moving to and from a bed to a chair (including a wheelchair)? 2  -LH      Standing up from a chair using your arms (e.g., wheelchair, bedside chair)? 2  -LH      Climbing 3-5 steps with a railing? 1  -LH      To walk in hospital room? 1  -LH      AM-PAC 6 Clicks Score (PT) 10  -                User Key  (r) = Recorded By, (t) = Taken By, (c) = Cosigned By      Initials Name Provider Type     Angy Curiel, PT Physical Therapist                     Time Calculation:    PT Charges       Row Name 06/18/25 1637             Time Calculation    Start Time 1545  -      Stop Time 1601  -      Time Calculation (min) 16 min  -      PT Received On 06/18/25  -      PT - Next Appointment 06/20/25  -      PT Goal Re-Cert Due Date 06/25/25  -         Time Calculation- PT    Total Timed Code Minutes- PT 8 minute(s)  -                User Key  (r) = Recorded By, (t) = Taken By, (c) = Cosigned By      Initials Name Provider Type     Angy Curiel, PT Physical Therapist                  Therapy Charges for Today       Code Description Service Date Service Provider Modifiers Qty    15801298044 HC PT EVAL MOD COMPLEXITY 3 6/18/2025 Angy Curiel, PT GP 1    56362768802 HC PT THER PROC EA 15 MIN 6/18/2025 Angy Curiel, PT GP 1            PT G-Codes  AM-PAC 6 Clicks Score (PT): 10    Angy Curiel, PT  6/18/2025

## 2025-06-18 NOTE — PROGRESS NOTES
Name: Benja Macedo ADMIT: 2025   : 1940  PCP: Yo Mckeon DNP, EVELIA    MRN: [id number] LOS: 0 days   AGE/SEX: 84 y.o. male  ROOM: [facility]     Subjective   Subjective   Mr. Macedo is laying in bed, in no acute distress, he does report back spasms, and generalized weakness.  Family  is planning to bring his wheel chair to bedside, neurosurgery has recommended physical therapy.  He otherwise offers no new complaints.  He denies any acute events overnight.  He reports his last admission to rehab was about 3 years ago, recent family member was at [facility], family and patient are requesting [facility] for rehab/ physical therapy.        Objective   Objective   Vital Signs  Temp:  [97.7 °F (36.5 °C)-98.2 °F (36.8 °C)] 97.9 °F (36.6 °C)  Heart Rate:  [61-67] 67  Resp:  [16-18] 16  BP: (152-182)/(58-77) 178/58  SpO2:  [95 %-96 %] 95 %  on   ;   Device (Oxygen Therapy): room air  Body mass index is 25.11 kg/m².  Physical Exam  Vitals and nursing note reviewed.   HENT:      Head: Atraumatic.      Mouth/Throat:      Mouth: Mucous membranes are dry.   Eyes:      Conjunctiva/sclera: Conjunctivae normal.   Cardiovascular:      Rate and Rhythm: Normal rate and regular rhythm.      Pulses: Normal pulses.           Radial pulses are 2+ on the right side.      Heart sounds: Normal heart sounds.   Pulmonary:      Effort: Pulmonary effort is normal.      Breath sounds: No decreased breath sounds.   Abdominal:      General: Bowel sounds are normal.      Palpations: Abdomen is soft.   Skin:     General: Skin is warm and dry.      Capillary Refill: Capillary refill takes 2 to 3 seconds.      Coloration: Skin is pale.   Neurological:      General: No focal deficit present.      Mental Status: He is alert. Mental status is at baseline.   Psychiatric:         Mood and Affect: Mood normal.       Results Review     I reviewed the patient's new clinical results.  Results from last 7 days   Lab Units  06/17/25  0440 06/16/25  1004   WBC 10*3/mm3 10.56 10.69   HEMOGLOBIN g/dL 13.0 15.5   PLATELETS 10*3/mm3 222 237     Results from last 7 days   Lab Units 06/17/25  0440 06/16/25  1004   SODIUM mmol/L 139 141   POTASSIUM mmol/L 4.1 4.9   CHLORIDE mmol/L 107 105   CO2 mmol/L 23.0 22.7   BUN mg/dL 25.0* 25.0*   CREATININE mg/dL 1.62* 2.07*   GLUCOSE mg/dL 99 141*   EGFR mL/min/1.73 42.1* 31.4*     Results from last 7 days   Lab Units 06/16/25  1004   ALBUMIN g/dL 4.2   BILIRUBIN mg/dL 0.7   ALK PHOS U/L 97   AST (SGOT) U/L 15   ALT (SGPT) U/L 32     Results from last 7 days   Lab Units 06/17/25  0440 06/16/25  1004   CALCIUM mg/dL 8.9 9.8   ALBUMIN g/dL  --  4.2       Hemoglobin A1C   Date/Time Value Ref Range Status   06/17/2025 0440 6.30 (H) 4.80 - 5.60 % Final       MRI Lumbar Spine Without Contrast  Result Date: 6/18/2025   1. There is mild thoracic spondylosis most pronounced at T10-11 where there is mild left and mild-to-moderate right facet overgrowth, a tiny amount of fluid in the facets, and adjacent ligamentum flavum thickening indent the right and left posterior aspect of the thecal sac.  Minimal posterior spurring indents the ventral thecal sac and there is at least mild up to mild-to-moderate narrowing of the thecal sac and mild bilateral foraminal narrowing at T10-11. The remainder of the thoracic spine MRI is essentially normal.   MRI OF THE LUMBAR SPINE TECHNIQUE: Sagittal T1, proton density and fat-suppressed T2 weighted images were obtained of the lumbar spine.  In addition, axial T2 weighted images were obtained from T12 to S2 and thin cut axial T1 and T2 weighted images were obtained angled through the interspaces from L2 to S1.  FINDINGS: The distal thoracic cord and the conus is normal in signal intensity.  The conus terminates at the L1-2 interspace level, which is normal.  At T12-L1, the posterior disc margin and facets are normal with no canal or foraminal narrowing.  At L1-2, there is mild  disc space narrowing, degenerative endplate changes, mild diffuse posterior disc osteophyte complex eccentric left paracentral posterolaterally indents the left ventral thecal sac and there is mild bilateral facet overgrowth and some adjacent ligamentum flavum thickening, and there is at least mild-to-moderate up to moderate generalized narrowing of the thecal sac.  There is mild bilateral foraminal narrowing.  At L2-3, there is mild disc desiccation, a minimal posterior disc bulge and mild facet overgrowth.  There is essentially no canal narrowing. There is no right foraminal narrowing.  There is minimal left foraminal narrowing.  At L3-4, there is mild disc space narrowing, degenerative endplate changes, and a mild diffuse posterior disc osteophyte complex indents the ventral thecal sac.  There is mild-to-moderate facet overgrowth with adjacent ligamentum flavum thickening that indents the right and left posterior aspect of the thecal sac and there is mild-to-moderate generalized narrowing of the thecal sac spurring into the foramen and mild bilateral foraminal narrowing.  At L4-5, there is moderate bilateral facet overgrowth and a 3 mm degenerative anterolisthesis of L4 with respect to L5.  There is mild canal narrowing.  There is slight narrowing of the left lateral recess. No right lateral recess narrowing.  There is some spurring into the foramina with mild right and mild-to-moderate left foraminal narrowing with endplate and facet spurs abutting the anterior and posterior aspect of the left L4 nerve root within the lateral aspect of the left foramen.  At L5-S1, the posterior disc margin is normal.  There is mild left and mild-to-moderate right facet overgrowth.  There is no canal or lateral recess or foraminal narrowing.  The lumbar vertebral body heights are well-maintained.  The marrow signal intensity in the lumbar spine is normal. The paravertebral soft tissue structures are normal.  IMPRESSION: 1. There  is lumbar spondylosis as described above.  2. At L1-2, there is mild bilateral facet overgrowth and ligamentum flavum thickening along the anteromedial margins of the facets that slightly indents the right and left posterior aspect of the thecal sac and there is diffuse posterior disc osteophyte complex eccentric to left paracentrally indents the ventral thecal sac.  There is up to moderate generalized narrowing of the thecal sac and mild bilateral foraminal narrowing at L1-2.  3. At L3-4, there is mild-to-moderate bilateral facet overgrowth. Ligamentum flavum thickening indents the right and left posterior thecal sac. A diffuse posterior disc osteophyte complex indents the ventral thecal sac and there is at least mild up to mild-to-moderate generalized narrowing of the thecal sac.  There is mild bilateral bony foraminal narrowing.  4. At L4-5, there is moderate bilateral facet overgrowth and there is mild narrowing of the thecal sac and slight narrowing of the left lateral recess and there is mild right foraminal narrowing.  There is endplate spurring into the inferior foramen and synovial thickening along the anterior superior facets extends into the posterior foramen. There is at least mild-to-moderate up to moderate narrowing of the lateral aspect of the left foramen with endplate and facet spurs abutting the anterior and posterior aspects of the exiting left L4 nerve root.  The remainder of the lumbar spine MRI is unremarkable.  This report was finalized on 6/18/2025 6:29 AM by Dr. Mac Oconnor M.D on Workstation: [id number]      MRI Thoracic Spine Without Contrast  Result Date: 6/18/2025   1. There is mild thoracic spondylosis most pronounced at T10-11 where there is mild left and mild-to-moderate right facet overgrowth, a tiny amount of fluid in the facets, and adjacent ligamentum flavum thickening indent the right and left posterior aspect of the thecal sac.  Minimal posterior spurring indents the ventral  thecal sac and there is at least mild up to mild-to-moderate narrowing of the thecal sac and mild bilateral foraminal narrowing at T10-11. The remainder of the thoracic spine MRI is essentially normal.   MRI OF THE LUMBAR SPINE TECHNIQUE: Sagittal T1, proton density and fat-suppressed T2 weighted images were obtained of the lumbar spine.  In addition, axial T2 weighted images were obtained from T12 to S2 and thin cut axial T1 and T2 weighted images were obtained angled through the interspaces from L2 to S1.  FINDINGS: The distal thoracic cord and the conus is normal in signal intensity.  The conus terminates at the L1-2 interspace level, which is normal.  At T12-L1, the posterior disc margin and facets are normal with no canal or foraminal narrowing.  At L1-2, there is mild disc space narrowing, degenerative endplate changes, mild diffuse posterior disc osteophyte complex eccentric left paracentral posterolaterally indents the left ventral thecal sac and there is mild bilateral facet overgrowth and some adjacent ligamentum flavum thickening, and there is at least mild-to-moderate up to moderate generalized narrowing of the thecal sac.  There is mild bilateral foraminal narrowing.  At L2-3, there is mild disc desiccation, a minimal posterior disc bulge and mild facet overgrowth.  There is essentially no canal narrowing. There is no right foraminal narrowing.  There is minimal left foraminal narrowing.  At L3-4, there is mild disc space narrowing, degenerative endplate changes, and a mild diffuse posterior disc osteophyte complex indents the ventral thecal sac.  There is mild-to-moderate facet overgrowth with adjacent ligamentum flavum thickening that indents the right and left posterior aspect of the thecal sac and there is mild-to-moderate generalized narrowing of the thecal sac spurring into the foramen and mild bilateral foraminal narrowing.  At L4-5, there is moderate bilateral facet overgrowth and a 3 mm  degenerative anterolisthesis of L4 with respect to L5.  There is mild canal narrowing.  There is slight narrowing of the left lateral recess. No right lateral recess narrowing.  There is some spurring into the foramina with mild right and mild-to-moderate left foraminal narrowing with endplate and facet spurs abutting the anterior and posterior aspect of the left L4 nerve root within the lateral aspect of the left foramen.  At L5-S1, the posterior disc margin is normal.  There is mild left and mild-to-moderate right facet overgrowth.  There is no canal or lateral recess or foraminal narrowing.  The lumbar vertebral body heights are well-maintained.  The marrow signal intensity in the lumbar spine is normal. The paravertebral soft tissue structures are normal.  IMPRESSION: 1. There is lumbar spondylosis as described above.  2. At L1-2, there is mild bilateral facet overgrowth and ligamentum flavum thickening along the anteromedial margins of the facets that slightly indents the right and left posterior aspect of the thecal sac and there is diffuse posterior disc osteophyte complex eccentric to left paracentrally indents the ventral thecal sac.  There is up to moderate generalized narrowing of the thecal sac and mild bilateral foraminal narrowing at L1-2.  3. At L3-4, there is mild-to-moderate bilateral facet overgrowth. Ligamentum flavum thickening indents the right and left posterior thecal sac. A diffuse posterior disc osteophyte complex indents the ventral thecal sac and there is at least mild up to mild-to-moderate generalized narrowing of the thecal sac.  There is mild bilateral bony foraminal narrowing.  4. At L4-5, there is moderate bilateral facet overgrowth and there is mild narrowing of the thecal sac and slight narrowing of the left lateral recess and there is mild right foraminal narrowing.  There is endplate spurring into the inferior foramen and synovial thickening along the anterior superior facets  extends into the posterior foramen. There is at least mild-to-moderate up to moderate narrowing of the lateral aspect of the left foramen with endplate and facet spurs abutting the anterior and posterior aspects of the exiting left L4 nerve root.  The remainder of the lumbar spine MRI is unremarkable.  This report was finalized on 6/18/2025 6:29 AM by Dr. Mac Oconnor M.D on Workstation: [id number]        I have personally reviewed all medications:  Scheduled Medications  vitamin C, 500 mg, Oral, Daily  aspirin, 81 mg, Oral, Daily  atorvastatin, 20 mg, Oral, Daily  carvedilol, 6.25 mg, Oral, BID With Meals  Lidocaine, 1 patch, Transdermal, Q24H  multivitamin with minerals, 1 tablet, Oral, Daily  sodium chloride, 10 mL, Intravenous, Q12H  vitamin B-12, 1,000 mcg, Oral, Daily    Infusions   Diet  Diet: Regular/House, Cardiac; Healthy Heart (2-3 Na+); Fluid Consistency: Thin (IDDSI 0)    I have personally reviewed:  [x]  Laboratory   [x]  Microbiology   [x]  Radiology   [x]  EKG/Telemetry  [x]  Cardiology/Vascular   []  Pathology    []  Records       Assessment/Plan     Active Hospital Problems    Diagnosis  POA    **Lumbar back pain [M54.50]  Yes    Stage 3b chronic kidney disease [N18.32]  Yes    Type 2 diabetes mellitus [E11.9]  Yes    Gastroesophageal reflux disease [K21.9]  Yes    Hyperlipidemia [E78.5]  Yes    Benign essential hypertension [I10]  Yes      Resolved Hospital Problems   No resolved problems to display.       84 y.o. male admitted with Lumbar back pain.  Who is wheelchair-bound at baseline presented to [facility] with increasing back spasms, and weakness.  He reports that he is having significant issues with transferring from a seated position to his wheelchair, from wheelchair to bed, and back from bed to wheelchair.  MRI was unremarkable for any acute abnormalities, neurosurgery has evaluated, and recommend physical therapy.    Lumbar back pain  With generalized weakness  ISAIAH  following appreciate their assistance recommendations  MRI of lumbar spine unremarkable for any acute abnormalities  PT consulted  He is wheelchair-bound at baseline, will need assistance with upper body strength.  CCP consulted for discharge planning  No loss of bowel or bladder  No saddle anesthesia    Type 2 diabetes mellitus  Chronic  Blood glucose continues to be stable and controlled  Most recent hemoglobin A1c 6.3  Continue to monitor    Hypertension  Chronic  Blood pressure elevated, improving with home Coreg   Home Coreg  No indications for adjustments at this time.    Also does report some intermittent constipation with his last bowel movement Saturday or Sunday.  Will add bowel management regimen.    History of CVA  ASA, B12, Statin and coreg       SCDs for DVT prophylaxis.  Full code.  Discussed with patient, family, and care team on multidisciplinary rounds.  Anticipate discharge to SNU facility once arrangements have been made.  Expected Discharge Date: 6/18/2025; Expected Discharge Time:       EVELIA Landaverde  [facility]  06/18/25  12:41 EDT

## 2025-06-19 LAB
ANION GAP SERPL CALCULATED.3IONS-SCNC: 13.6 MMOL/L (ref 5–15)
BUN SERPL-MCNC: 28 MG/DL (ref 8–23)
BUN/CREAT SERPL: 17.9 (ref 7–25)
CALCIUM SPEC-SCNC: 8.8 MG/DL (ref 8.6–10.5)
CHLORIDE SERPL-SCNC: 106 MMOL/L (ref 98–107)
CO2 SERPL-SCNC: 22.4 MMOL/L (ref 22–29)
CREAT SERPL-MCNC: 1.56 MG/DL (ref 0.76–1.27)
DEPRECATED RDW RBC AUTO: 46.3 FL (ref 37–54)
EGFRCR SERPLBLD CKD-EPI 2021: 44.1 ML/MIN/1.73
ERYTHROCYTE [DISTWIDTH] IN BLOOD BY AUTOMATED COUNT: 12.2 % (ref 12.3–15.4)
GLUCOSE SERPL-MCNC: 95 MG/DL (ref 65–99)
HCT VFR BLD AUTO: 41.7 % (ref 37.5–51)
HGB BLD-MCNC: 13.4 G/DL (ref 13–17.7)
HOLD SPECIMEN: NORMAL
MCH RBC QN AUTO: 33 PG (ref 26.6–33)
MCHC RBC AUTO-ENTMCNC: 32.1 G/DL (ref 31.5–35.7)
MCV RBC AUTO: 102.7 FL (ref 79–97)
PLATELET # BLD AUTO: 214 10*3/MM3 (ref 140–450)
PMV BLD AUTO: 10.5 FL (ref 6–12)
POTASSIUM SERPL-SCNC: 4.2 MMOL/L (ref 3.5–5.2)
RBC # BLD AUTO: 4.06 10*6/MM3 (ref 4.14–5.8)
SODIUM SERPL-SCNC: 142 MMOL/L (ref 136–145)
WBC NRBC COR # BLD AUTO: 10.18 10*3/MM3 (ref 3.4–10.8)
WHOLE BLOOD HOLD SPECIMEN: NORMAL

## 2025-06-19 PROCEDURE — 80048 BASIC METABOLIC PNL TOTAL CA: CPT | Performed by: NURSE PRACTITIONER

## 2025-06-19 PROCEDURE — G0378 HOSPITAL OBSERVATION PER HR: HCPCS

## 2025-06-19 PROCEDURE — 85027 COMPLETE CBC AUTOMATED: CPT | Performed by: NURSE PRACTITIONER

## 2025-06-19 RX ORDER — LIDOCAINE 4 G/G
1 PATCH TOPICAL
Qty: 15 PATCH | Refills: 0 | Status: SHIPPED | OUTPATIENT
Start: 2025-06-20

## 2025-06-19 RX ORDER — HYDROCODONE BITARTRATE AND ACETAMINOPHEN 5; 325 MG/1; MG/1
1 TABLET ORAL EVERY 4 HOURS PRN
Qty: 4 TABLET | Refills: 0 | Status: SHIPPED | OUTPATIENT
Start: 2025-06-19 | End: 2025-06-21

## 2025-06-19 RX ADMIN — Medication 1 TABLET: at 08:50

## 2025-06-19 RX ADMIN — CARVEDILOL 6.25 MG: 6.25 TABLET, FILM COATED ORAL at 08:49

## 2025-06-19 RX ADMIN — Medication 1000 MCG: at 08:50

## 2025-06-19 RX ADMIN — ASPIRIN 81 MG: 81 TABLET, COATED ORAL at 08:50

## 2025-06-19 RX ADMIN — Medication 10 ML: at 08:51

## 2025-06-19 RX ADMIN — ATORVASTATIN CALCIUM 20 MG: 20 TABLET, FILM COATED ORAL at 08:49

## 2025-06-19 RX ADMIN — CARVEDILOL 6.25 MG: 6.25 TABLET, FILM COATED ORAL at 17:19

## 2025-06-19 RX ADMIN — Medication 10 ML: at 20:36

## 2025-06-19 RX ADMIN — LIDOCAINE 1 PATCH: 4 PATCH TOPICAL at 08:50

## 2025-06-19 RX ADMIN — HYDROCODONE BITARTRATE AND ACETAMINOPHEN 1 TABLET: 5; 325 TABLET ORAL at 20:36

## 2025-06-19 RX ADMIN — OXYCODONE HYDROCHLORIDE AND ACETAMINOPHEN 500 MG: 500 TABLET ORAL at 08:50

## 2025-06-19 NOTE — NURSING NOTE
Gave report to [place] @ 5528 going to [facility]. Pt and family aware.

## 2025-06-19 NOTE — PROGRESS NOTES
Discharge Planning Assessment  [facility]     Patient Name: Benja Macedo  MRN: [id number]  Today's Date: 6/19/2025    Admit Date: 6/16/2025    Plan: [facility] skilled rehab   Discharge Needs Assessment    No documentation.                  Discharge Plan       Row Name 06/19/25 1403       Plan    Plan [facility]  skilled rehab    Plan Comments Spoke with patient at [facility] for discharge planning, he is agreeable with skilled rehab at [facility]. He will be transported by [facility] transport. Mindi DAVID                  Continued Care and Services - Admitted Since 6/16/2025       Destination Coordination complete.      Service Provider Request Status Services Address Phone Fax Patient Preferred    [facility]  Selected Skilled Nursing [address] [address] [phone] --    [facility] Declined  Bed not available -- [address] [id number] [phone] --    [facility] Declined  Clinical Denial -- [address] [address] [phone] --    [facility] Declined  Bed not available -- [address] [id number] [phone] --                  Expected Discharge Date and Time       Expected Discharge Date Expected Discharge Time    Jun 19, 2025            Demographic Summary    No documentation.                  Functional Status    No documentation.                  Psychosocial    No documentation.                  Abuse/Neglect    No documentation.                  Legal    No documentation.                  Substance Abuse    No documentation.                  Patient Forms    No documentation.                     Cherie Ashley, RN

## 2025-06-19 NOTE — DISCHARGE SUMMARY
Please use as progress noted for 25  Patient Name: Benja Macedo  : 1940  MRN: [id number]    Date of Admission: 2025  Date of Discharge:  2025  Primary Care Physician: Yo Mckeon, ANN MARIE, APRN      Chief Complaint:   Back Pain and Weakness - Generalized      Discharge Diagnoses     Active Hospital Problems    Diagnosis  POA   • **Lumbar back pain [M54.50]  Yes   • Stage 3b chronic kidney disease [N18.32]  Yes   • Type 2 diabetes mellitus [E11.9]  Yes   • Gastroesophageal reflux disease [K21.9]  Yes   • Hyperlipidemia [E78.5]  Yes   • Benign essential hypertension [I10]  Yes      Resolved Hospital Problems   No resolved problems to display.        Hospital Course     Mr. Macedo is a 84 y.o. male with a history of chronic back pain, type 2 diabetes mellitus, hypertension, CKD, CVA, and disability who presented to [facility] initially complaining of severe pain with any type of movement in his low over the last 4 to 5 days..  Please see the admitting history and physical for further details.  He was found to have chronic thoracic, and lumbar spine abnormalities.  He has had chronic low back pain, muscle spasms that have increased over the last week.  He has consistently denied radiation of pain to bilateral lower extremities.  Any loss of bowel or bladder, or saddle anesthesia.  Not had any prior surgical interventions. Neurosurgery has evaluated, MRI of lumbar and thoracic spine shows no acute abnormalities.  Neurosurgery recommended no additional evaluation or workup at this time. He has been evaluated by PT and OT with recommended discharge home with family, home health or SNF. He has significant deconditioning of upper extremities, and would benefit from additional physical therapy to assist with strengthening, and mobility/transfers. He is medically stable for discharge to SNF, for continued PT/OT.  His creatinine has remained stable, near his baseline throughout his  hospitalization.  Hemoglobin A1c was 6.30.  He has been advised to follow-up with his primary care provider for further surveillance of elevated hemoglobin A1c.  He has been advised to take all of his medications as they have been prescribed to him and to attend all follow-up appointments.    Day of Discharge     Subject: He is eager to be discharged to SNF, and is hopeful that he will gain some strength, and mobility with physical therapy/Occupational Therapy.  He offers no new complaints at this time, states his pain has been well-controlled    Physical Exam:  Temp:  [97.7 °F (36.5 °C)-98.3 °F (36.8 °C)] 98.3 °F (36.8 °C)  Heart Rate:  [58-69] 69  Resp:  [16] 16  BP: (146-191)/(58-68) 176/61  Body mass index is 25.11 kg/m².  Physical Exam  Vitals and nursing note reviewed.   Constitutional:       Appearance: He is ill-appearing.   HENT:      Head: Atraumatic.      Mouth/Throat:      Mouth: Mucous membranes are dry.   Eyes:      Conjunctiva/sclera: Conjunctivae normal.   Cardiovascular:      Rate and Rhythm: Normal rate and regular rhythm.      Pulses: Normal pulses.           Radial pulses are 2+ on the right side and 2+ on the left side.      Heart sounds: Normal heart sounds.   Pulmonary:      Effort: Pulmonary effort is normal.      Breath sounds: Normal breath sounds.   Abdominal:      General: Bowel sounds are normal.      Palpations: Abdomen is soft.   Musculoskeletal:      Right lower leg: No edema.      Left lower leg: No edema.   Skin:     General: Skin is warm and dry.      Coloration: Skin is pale.   Neurological:      General: No focal deficit present.      Mental Status: He is alert and oriented to person, place, and time. Mental status is at baseline.   Psychiatric:         Mood and Affect: Mood normal.         Consultants     Consult Orders (all) (From admission, onward)       Start     Ordered    06/18/25 1224  Inpatient Case Management  Consult  Once        Comments: Will need  placement   Provider:  (Not yet assigned)    06/18/25 1223    06/16/25 1841  Inpatient Neurosurgery Consult  Once        Specialty:  Neurosurgery  Provider:  Cristofer Montanez MD    06/16/25 1843    06/16/25 1256  [facility] (on-call MD unless specified) Details  Once        Specialty:  Hospitalist  Provider:  Tony Charles MD    06/16/25 1255                  Procedures     * Surgery not found *    Imaging Results (All)       Procedure Component Value Units Date/Time    MRI Lumbar Spine Without Contrast [[id number]] Collected: 06/17/25 1521     Updated: 06/18/25 0632    Narrative:      MRI OF THE THORACIC AND LUMBAR SPINE WITHOUT CONTRAST 06/17/2025     CLINICAL HISTORY: This is an 84-year-old male patient who has chronic  mid and lower back pain and generalized weakness.     MRI OF THE THORACIC SPINE TECHNIQUE: Sagittal T1, proton density and  fat-suppressed T2 weighted images were obtained of the thoracic spine.   In addition, axial T1 and T2 weighted images were obtained from C7 to T7  and then from T7 to T12.      There are no prior thoracic spine MRIs for comparison. This is  correlated to a prior chest, abdomen and pelvic CT on 07/06/2023.     FINDINGS: Thoracic cord is normal in signal intensity and the conus  terminates at the L1 lumbar level, which is normal.      At T1-2, there is minimal right posterior lateral endplate spurring.   The facets are normal. There is no canal or foraminal narrowing.     At T2-3, T3-4, T4-5, T5-6, and T6-7, the posterior disc margin and  facets are normal with no canal or foraminal narrowing.     At T7-8, there is a tiny posterior spur that minimally indents the  ventral thecal sac.  The facets are normal. There is no canal or  foraminal narrowing.     At T8-9, the disc space and facets are normal with no canal or foraminal  narrowing.     At T9-10, there is mild right facet overgrowth and minimal right  posterolateral endplate spurring minimally narrowing the right  lateral  aspect of the canal.  There is no foraminal narrowing.     At T10-11, there is mild left and mild-to-moderate right facet  overgrowth with a tiny amount of fluid in the right facets.  The facet  with adjacent ligamentum flavum thickening indent the right and left  posterior lateral aspect of the thecal sac, and there is minimal  posterior spurring and there is mild-to-moderate canal narrowing and  there is facet spurring into the foramen and there is mild bilateral  foraminal narrowing.     At T11-12 and T12-L1, the posterior disc margin and facets are normal  with no canal or foraminal narrowing.     The thoracic vertebral body heights are well-maintained and the marrow  signal intensity in the thoracic spine is normal.  The paravertebral  soft tissue structures are normal.       Impression:         1. There is mild thoracic spondylosis most pronounced at T10-11 where  there is mild left and mild-to-moderate right facet overgrowth, a tiny  amount of fluid in the facets, and adjacent ligamentum flavum thickening  indent the right and left posterior aspect of the thecal sac.  Minimal  posterior spurring indents the ventral thecal sac and there is at least  mild up to mild-to-moderate narrowing of the thecal sac and mild  bilateral foraminal narrowing at T10-11. The remainder of the thoracic  spine MRI is essentially normal.        MRI OF THE LUMBAR SPINE TECHNIQUE: Sagittal T1, proton density and  fat-suppressed T2 weighted images were obtained of the lumbar spine.  In  addition, axial T2 weighted images were obtained from T12 to S2 and thin  cut axial T1 and T2 weighted images were obtained angled through the  interspaces from L2 to S1.     FINDINGS: The distal thoracic cord and the conus is normal in signal  intensity.  The conus terminates at the L1-2 interspace level, which is  normal.     At T12-L1, the posterior disc margin and facets are normal with no canal  or foraminal narrowing.     At L1-2, there  is mild disc space narrowing, degenerative endplate  changes, mild diffuse posterior disc osteophyte complex eccentric left  paracentral posterolaterally indents the left ventral thecal sac and  there is mild bilateral facet overgrowth and some adjacent ligamentum  flavum thickening, and there is at least mild-to-moderate up to moderate  generalized narrowing of the thecal sac.  There is mild bilateral  foraminal narrowing.     At L2-3, there is mild disc desiccation, a minimal posterior disc bulge  and mild facet overgrowth.  There is essentially no canal narrowing.   There is no right foraminal narrowing.  There is minimal left foraminal  narrowing.      At L3-4, there is mild disc space narrowing, degenerative endplate  changes, and a mild diffuse posterior disc osteophyte complex indents  the ventral thecal sac.  There is mild-to-moderate facet overgrowth with  adjacent ligamentum flavum thickening that indents the right and left  posterior aspect of the thecal sac and there is mild-to-moderate  generalized narrowing of the thecal sac spurring into the foramen and  mild bilateral foraminal narrowing.     At L4-5, there is moderate bilateral facet overgrowth and a 3 mm  degenerative anterolisthesis of L4 with respect to L5.  There is mild  canal narrowing.  There is slight narrowing of the left lateral recess.   No right lateral recess narrowing.  There is some spurring into the  foramina with mild right and mild-to-moderate left foraminal narrowing  with endplate and facet spurs abutting the anterior and posterior aspect  of the left L4 nerve root within the lateral aspect of the left foramen.     At L5-S1, the posterior disc margin is normal.  There is mild left and  mild-to-moderate right facet overgrowth.  There is no canal or lateral  recess or foraminal narrowing.     The lumbar vertebral body heights are well-maintained.  The marrow  signal intensity in the lumbar spine is normal. The paravertebral  soft  tissue structures are normal.     IMPRESSION:  1. There is lumbar spondylosis as described above.     2. At L1-2, there is mild bilateral facet overgrowth and ligamentum  flavum thickening along the anteromedial margins of the facets that  slightly indents the right and left posterior aspect of the thecal sac  and there is diffuse posterior disc osteophyte complex eccentric to left  paracentrally indents the ventral thecal sac.  There is up to moderate  generalized narrowing of the thecal sac and mild bilateral foraminal  narrowing at L1-2.     3. At L3-4, there is mild-to-moderate bilateral facet overgrowth.   Ligamentum flavum thickening indents the right and left posterior thecal  sac. A diffuse posterior disc osteophyte complex indents the ventral  thecal sac and there is at least mild up to mild-to-moderate generalized  narrowing of the thecal sac.  There is mild bilateral bony foraminal  narrowing.     4. At L4-5, there is moderate bilateral facet overgrowth and there is  mild narrowing of the thecal sac and slight narrowing of the left  lateral recess and there is mild right foraminal narrowing.  There is  endplate spurring into the inferior foramen and synovial thickening  along the anterior superior facets extends into the posterior foramen.   There is at least mild-to-moderate up to moderate narrowing of the  lateral aspect of the left foramen with endplate and facet spurs  abutting the anterior and posterior aspects of the exiting left L4 nerve  root.  The remainder of the lumbar spine MRI is unremarkable.      This report was finalized on 6/18/2025 6:29 AM by Dr. Mac Oconnro M.D  on Workstation: [id number]       MRI Thoracic Spine Without Contrast [[id number]] Collected: 06/17/25 1521     Updated: 06/18/25 0632    Narrative:      MRI OF THE THORACIC AND LUMBAR SPINE WITHOUT CONTRAST 06/17/2025     CLINICAL HISTORY: This is an 84-year-old male patient who has chronic  mid and lower back pain and  generalized weakness.     MRI OF THE THORACIC SPINE TECHNIQUE: Sagittal T1, proton density and  fat-suppressed T2 weighted images were obtained of the thoracic spine.   In addition, axial T1 and T2 weighted images were obtained from C7 to T7  and then from T7 to T12.      There are no prior thoracic spine MRIs for comparison. This is  correlated to a prior chest, abdomen and pelvic CT on 07/06/2023.     FINDINGS: Thoracic cord is normal in signal intensity and the conus  terminates at the L1 lumbar level, which is normal.      At T1-2, there is minimal right posterior lateral endplate spurring.   The facets are normal. There is no canal or foraminal narrowing.     At T2-3, T3-4, T4-5, T5-6, and T6-7, the posterior disc margin and  facets are normal with no canal or foraminal narrowing.     At T7-8, there is a tiny posterior spur that minimally indents the  ventral thecal sac.  The facets are normal. There is no canal or  foraminal narrowing.     At T8-9, the disc space and facets are normal with no canal or foraminal  narrowing.     At T9-10, there is mild right facet overgrowth and minimal right  posterolateral endplate spurring minimally narrowing the right lateral  aspect of the canal.  There is no foraminal narrowing.     At T10-11, there is mild left and mild-to-moderate right facet  overgrowth with a tiny amount of fluid in the right facets.  The facet  with adjacent ligamentum flavum thickening indent the right and left  posterior lateral aspect of the thecal sac, and there is minimal  posterior spurring and there is mild-to-moderate canal narrowing and  there is facet spurring into the foramen and there is mild bilateral  foraminal narrowing.     At T11-12 and T12-L1, the posterior disc margin and facets are normal  with no canal or foraminal narrowing.     The thoracic vertebral body heights are well-maintained and the marrow  signal intensity in the thoracic spine is normal.  The paravertebral  soft tissue  structures are normal.       Impression:         1. There is mild thoracic spondylosis most pronounced at T10-11 where  there is mild left and mild-to-moderate right facet overgrowth, a tiny  amount of fluid in the facets, and adjacent ligamentum flavum thickening  indent the right and left posterior aspect of the thecal sac.  Minimal  posterior spurring indents the ventral thecal sac and there is at least  mild up to mild-to-moderate narrowing of the thecal sac and mild  bilateral foraminal narrowing at T10-11. The remainder of the thoracic  spine MRI is essentially normal.        MRI OF THE LUMBAR SPINE TECHNIQUE: Sagittal T1, proton density and  fat-suppressed T2 weighted images were obtained of the lumbar spine.  In  addition, axial T2 weighted images were obtained from T12 to S2 and thin  cut axial T1 and T2 weighted images were obtained angled through the  interspaces from L2 to S1.     FINDINGS: The distal thoracic cord and the conus is normal in signal  intensity.  The conus terminates at the L1-2 interspace level, which is  normal.     At T12-L1, the posterior disc margin and facets are normal with no canal  or foraminal narrowing.     At L1-2, there is mild disc space narrowing, degenerative endplate  changes, mild diffuse posterior disc osteophyte complex eccentric left  paracentral posterolaterally indents the left ventral thecal sac and  there is mild bilateral facet overgrowth and some adjacent ligamentum  flavum thickening, and there is at least mild-to-moderate up to moderate  generalized narrowing of the thecal sac.  There is mild bilateral  foraminal narrowing.     At L2-3, there is mild disc desiccation, a minimal posterior disc bulge  and mild facet overgrowth.  There is essentially no canal narrowing.   There is no right foraminal narrowing.  There is minimal left foraminal  narrowing.      At L3-4, there is mild disc space narrowing, degenerative endplate  changes, and a mild diffuse posterior  disc osteophyte complex indents  the ventral thecal sac.  There is mild-to-moderate facet overgrowth with  adjacent ligamentum flavum thickening that indents the right and left  posterior aspect of the thecal sac and there is mild-to-moderate  generalized narrowing of the thecal sac spurring into the foramen and  mild bilateral foraminal narrowing.     At L4-5, there is moderate bilateral facet overgrowth and a 3 mm  degenerative anterolisthesis of L4 with respect to L5.  There is mild  canal narrowing.  There is slight narrowing of the left lateral recess.   No right lateral recess narrowing.  There is some spurring into the  foramina with mild right and mild-to-moderate left foraminal narrowing  with endplate and facet spurs abutting the anterior and posterior aspect  of the left L4 nerve root within the lateral aspect of the left foramen.     At L5-S1, the posterior disc margin is normal.  There is mild left and  mild-to-moderate right facet overgrowth.  There is no canal or lateral  recess or foraminal narrowing.     The lumbar vertebral body heights are well-maintained.  The marrow  signal intensity in the lumbar spine is normal. The paravertebral soft  tissue structures are normal.     IMPRESSION:  1. There is lumbar spondylosis as described above.     2. At L1-2, there is mild bilateral facet overgrowth and ligamentum  flavum thickening along the anteromedial margins of the facets that  slightly indents the right and left posterior aspect of the thecal sac  and there is diffuse posterior disc osteophyte complex eccentric to left  paracentrally indents the ventral thecal sac.  There is up to moderate  generalized narrowing of the thecal sac and mild bilateral foraminal  narrowing at L1-2.     3. At L3-4, there is mild-to-moderate bilateral facet overgrowth.   Ligamentum flavum thickening indents the right and left posterior thecal  sac. A diffuse posterior disc osteophyte complex indents the ventral  thecal  sac and there is at least mild up to mild-to-moderate generalized  narrowing of the thecal sac.  There is mild bilateral bony foraminal  narrowing.     4. At L4-5, there is moderate bilateral facet overgrowth and there is  mild narrowing of the thecal sac and slight narrowing of the left  lateral recess and there is mild right foraminal narrowing.  There is  endplate spurring into the inferior foramen and synovial thickening  along the anterior superior facets extends into the posterior foramen.   There is at least mild-to-moderate up to moderate narrowing of the  lateral aspect of the left foramen with endplate and facet spurs  abutting the anterior and posterior aspects of the exiting left L4 nerve  root.  The remainder of the lumbar spine MRI is unremarkable.      This report was finalized on 6/18/2025 6:29 AM by Dr. Mac Oconnor M.D  on Workstation: [id number]       CT Lumbar Spine Without Contrast [[id number]] Collected: 06/16/25 1313     Updated: 06/16/25 1811    Narrative:      EMERGENCY CT SCAN OF THE LUMBAR SPINE WITHOUT CONTRAST ON 06/16/2025     CLINICAL HISTORY: This is an 84-year-old male patient, who complains of  acute low back pain, primarily in right lower back that results in some  generalized weakness.     TECHNIQUE: Spiral CT images were obtained from the T10-T11 thoracic disc  space level down to the superior body of the S4 sacral segment, and the  images were reformatted and are submitted in 3 mm thick axial CT  sections with soft tissue algorithm, and 1 mm thick axial CT sections  with high resolution bone algorithm, and 2 mm thick sagittal and coronal  reconstructions were performed and submitted in soft tissue algorithm.     FINDINGS: At T10-T11, there is only partial visualization of the T10-T11  disc space level and there is some motion artifact. There is a large  right anterior marginal osteophyte that contains some vacuum disc  phenomenon from the anterior disc space insinuating  between the anterior  marginal osteophytes. There is blurring of the posterior disc margin and  the canal and foramina at T10-T11 are not adequately assessed.     At T11-T12, there is minimal posterior disc bulge. The facets are  normal. I see no canal or foraminal narrowing.     At T12-L1, the posterior disc margin and facets are normal with no canal  or foraminal narrowing.     At L1-L2, there is minimal bilateral facet overgrowth, some ligamentum  flavum thickening off the anteromedial facets. There is mild diffuse  posterior disc bulge and there is up to mild-to-moderate narrowing of  the thecal sac. There is mild bilateral foraminal narrowing.     At L2-L3, there is a minimal posterior disc bulge. There is mild left  and minimal right facet overgrowth. There is mild narrowing of the  thecal sac and mild foraminal narrowing.     At L3-L4, there is mild bilateral facet overgrowth. There is a 2 mm  retrolisthesis of L3 with respect to L4, and a mild diffuse posterior  disc bulge. There is at least mild up to mild-to-moderate narrowing of  the thecal sac and there is mild-to-moderate bilateral foraminal  narrowing.     At L4-L5, there is moderate right and moderate-to-severe left facet  overgrowth. There is a 2-3 mm degenerative anterolisthesis of L4 with  respect to L5. There is only mild narrowing of the thecal sac. There is  slight narrowing of the left lateral recess. There is minimal right  foraminal narrowing. There is some bulging disc material into the  inferior left foramen and to the far left laterally, synovial thickening  off the facets extending into the posterior left foramen. There is  mild-to-moderate left foraminal narrowing.     At L5-S1, there is mild left and there is mild-to-moderate right facet  overgrowth. The posterior disc margin is normal. There is no canal or  lateral recess or foraminal narrowing.     No acute fracture is seen in the lumbar spine.        Impression:      1. No convincing  "acute fracture is seen in the lumbar spine.     2. There is lumbar spondylosis as described above. If clinical symptoms  warrant an MRI of the lumbar spine could be obtained for a more  comprehensive assessment.     3. The images start superiorly at the level of the T10-T11 thoracic  interspace level and there is artifact streaking through the canal and  along the posterior disc margin, and the soft tissue axial images  suggests possible disc herniation and canal narrowing, but I think this  is probably artifact. If there are symptoms referable to the lower  thoracic cord, CT or MRI of the thoracic spine could be obtained to more  completely assess this level. The results were communicated to Dr. Chauhan in the ER by telephone on 06/16/2025 at 12:30 p.m.     Radiation dose reduction techniques were utilized, including automated  exposure control and exposure modulation based on body size.        This report was finalized on 6/16/2025 6:08 PM by Dr. Mac Oconnor M.D  on Workstation: [id number]                 Pertinent Labs     Results from last 7 days   Lab Units 06/19/25 0106 06/17/25 0440 06/16/25  1004   WBC 10*3/mm3 10.18 10.56 10.69   HEMOGLOBIN g/dL 13.4 13.0 15.5   PLATELETS 10*3/mm3 214 222 237     Results from last 7 days   Lab Units 06/19/25 0106 06/17/25 0440 06/16/25  1004   SODIUM mmol/L 142 139 141   POTASSIUM mmol/L 4.2 4.1 4.9   CHLORIDE mmol/L 106 107 105   CO2 mmol/L 22.4 23.0 22.7   BUN mg/dL 28.0* 25.0* 25.0*   CREATININE mg/dL 1.56* 1.62* 2.07*   GLUCOSE mg/dL 95 99 141*   EGFR mL/min/1.73 44.1* 42.1* 31.4*     Results from last 7 days   Lab Units 06/16/25  1004   ALBUMIN g/dL 4.2   BILIRUBIN mg/dL 0.7   ALK PHOS U/L 97   AST (SGOT) U/L 15   ALT (SGPT) U/L 32     Results from last 7 days   Lab Units 06/19/25 0106 06/17/25 0440 06/16/25  1004   CALCIUM mg/dL 8.8 8.9 9.8   ALBUMIN g/dL  --   --  4.2               Invalid input(s): \"LDLCALC\"          Test Results Pending at Discharge "     Pending Results       None              Discharge Details        Discharge Medications        New Medications        Instructions Start Date   HYDROcodone-acetaminophen 5-325 MG per tablet  Commonly known as: NORCO   1 tablet, Oral, Every 4 Hours PRN      Lidocaine 4 %   1 patch, Transdermal, Every 24 Hours Scheduled, Remove & Discard patch within 12 hours or as directed by MD   Start Date: June 20, 2025            Continue These Medications        Instructions Start Date   acetaminophen 500 MG tablet  Commonly known as: TYLENOL   1,000 mg, Nightly PRN      ASPIRIN 81 PO   81 mg, Daily      atorvastatin 20 MG tablet  Commonly known as: LIPITOR   20 mg, Oral, Daily      carvedilol 6.25 MG tablet  Commonly known as: COREG   6.25 mg, Oral, 2 Times Daily With Meals      coenzyme Q10 100 MG capsule   100 mg, Daily      fish oil 1000 MG capsule capsule   Daily With Breakfast      fluticasone 50 MCG/ACT nasal spray  Commonly known as: FLONASE   2 sprays, Daily PRN      multivitamin with minerals tablet tablet   Daily      vitamin B-12 1000 MCG tablet  Commonly known as: CYANOCOBALAMIN   1,000 mcg, Daily      vitamin C 500 MG tablet  Commonly known as: ASCORBIC ACID   500 mg, Daily               No Known Allergies    Discharge Disposition:  Skilled Nursing Facility (DC - External)      Discharge Diet:  Diet Order   Procedures   • Diet: Regular/House, Cardiac; Healthy Heart (2-3 Na+); Fluid Consistency: Thin (IDDSI 0)       Discharge Activity: As tolerated  CODE STATUS:    Code Status and Medical Interventions: CPR (Attempt to Resuscitate); Full Support   Ordered at: 06/16/25 1843     Code Status (Patient has no pulse and is not breathing):    CPR (Attempt to Resuscitate)     Medical Interventions (Patient has pulse or is breathing):    Full Support       Future Appointments   Date Time Provider Department Center   8/14/2025  9:15 AM Yo Mckeon, ANN MARIE, APRN [facility]  [facility]      Contact information for follow-up  providers       Yo Mckeon DNP, APRN .    Specialty: Internal Medicine  Contact information:  [address]  [address] [address]  [phone]                       Contact information for after-discharge care       Destination       [facility] .    Service: Skilled Nursing  Contact information:  [address]  [facility] [address]  [phone]                                   Time Spent on Discharge:  Greater than 30 minutes      EVELIA Landaverde  [facility]  06/19/25  15:11 EDT

## 2025-06-20 VITALS
BODY MASS INDEX: 25.83 KG/M2 | HEART RATE: 65 BPM | TEMPERATURE: 98 F | HEIGHT: 70 IN | RESPIRATION RATE: 16 BRPM | WEIGHT: 180.4 LBS | OXYGEN SATURATION: 98 % | DIASTOLIC BLOOD PRESSURE: 65 MMHG | SYSTOLIC BLOOD PRESSURE: 142 MMHG

## 2025-06-20 LAB
DEPRECATED RDW RBC AUTO: 45.3 FL (ref 37–54)
ERYTHROCYTE [DISTWIDTH] IN BLOOD BY AUTOMATED COUNT: 12.3 % (ref 12.3–15.4)
HCT VFR BLD AUTO: 41.1 % (ref 37.5–51)
HGB BLD-MCNC: 13.5 G/DL (ref 13–17.7)
HOLD SPECIMEN: NORMAL
MCH RBC QN AUTO: 33 PG (ref 26.6–33)
MCHC RBC AUTO-ENTMCNC: 32.8 G/DL (ref 31.5–35.7)
MCV RBC AUTO: 100.5 FL (ref 79–97)
PLATELET # BLD AUTO: 228 10*3/MM3 (ref 140–450)
PMV BLD AUTO: 10.2 FL (ref 6–12)
RBC # BLD AUTO: 4.09 10*6/MM3 (ref 4.14–5.8)
WBC NRBC COR # BLD AUTO: 9.83 10*3/MM3 (ref 3.4–10.8)
WHOLE BLOOD HOLD SPECIMEN: NORMAL

## 2025-06-20 PROCEDURE — 97110 THERAPEUTIC EXERCISES: CPT

## 2025-06-20 PROCEDURE — 85027 COMPLETE CBC AUTOMATED: CPT | Performed by: NURSE PRACTITIONER

## 2025-06-20 PROCEDURE — 97166 OT EVAL MOD COMPLEX 45 MIN: CPT

## 2025-06-20 PROCEDURE — 97530 THERAPEUTIC ACTIVITIES: CPT

## 2025-06-20 PROCEDURE — G0378 HOSPITAL OBSERVATION PER HR: HCPCS

## 2025-06-20 RX ADMIN — Medication 1000 MCG: at 08:22

## 2025-06-20 RX ADMIN — ACETAMINOPHEN 650 MG: 325 TABLET, FILM COATED ORAL at 10:01

## 2025-06-20 RX ADMIN — Medication 10 ML: at 09:27

## 2025-06-20 RX ADMIN — ASPIRIN 81 MG: 81 TABLET, COATED ORAL at 08:22

## 2025-06-20 RX ADMIN — LIDOCAINE 1 PATCH: 4 PATCH TOPICAL at 08:21

## 2025-06-20 RX ADMIN — Medication 1 TABLET: at 08:22

## 2025-06-20 RX ADMIN — CARVEDILOL 6.25 MG: 6.25 TABLET, FILM COATED ORAL at 08:21

## 2025-06-20 RX ADMIN — OXYCODONE HYDROCHLORIDE AND ACETAMINOPHEN 500 MG: 500 TABLET ORAL at 08:21

## 2025-06-20 RX ADMIN — ATORVASTATIN CALCIUM 20 MG: 20 TABLET, FILM COATED ORAL at 08:21

## 2025-06-20 NOTE — CASE MANAGEMENT/SOCIAL WORK
Post-Acute Authorization Submission      Post Acute Pre-Cert Documentation  Request Submitted by Facility - Type:: Hospital  Post-Acute Authorization Type Submitted:: SNF  Date Post Acute Pre-Cert Inititated per Facility: 06/20/25  Accepting Facility: [facility]  Hospital Discharge Date Requested: 06/20/25  All Clinicals Submitted?: Yes  Had Accepting Facility at Time of Submission: Yes  Response Communicated to:: , Accepting Facility Liaison  Authorization Number:: PENDING [id number]  Post Acute Pre-Cert Initiated Comment: NOTIFIED BY CCP TO CHANGE FACILITY TO [facility]; UPDATED INFO TO JERARDO ARIZMENDI.              Deric Benítez, PCT

## 2025-06-20 NOTE — THERAPY TREATMENT NOTE
Acute Care - Physical Therapy Treatment Note  [facility]     Patient Name: Benja Macedo  : 1940  MRN: [id number]  Today's Date: 2025   Onset of Illness/Injury or Date of Surgery: 25  Visit Dx:     ICD-10-CM ICD-9-CM   1. Lumbar back pain  M54.50 724.2   2. Generalized weakness  R53.1 780.79   3. Difficulty walking  R26.2 719.7   4. Chronic kidney disease, unspecified CKD stage  N18.9 585.9     Patient Active Problem List   Diagnosis    Benign essential hypertension    Gastroesophageal reflux disease    Hyperglycemia    Hyperlipidemia    Nocturia    Renal insufficiency    Cerebrovascular accident - history of    Type 2 diabetes mellitus    Benign hypertension with chronic kidney disease    Stage 3b chronic kidney disease    Edema of lower extremity    Dyslipidemia    History of cerebrovascular accident    Pain of left shoulder joint on movement    Weakness    UTI (urinary tract infection)    Acute UTI (urinary tract infection)    Nodule of upper lobe of left lung    Abnormal CT of the abdomen    Abnormal CT of the head    Vitamin D deficiency    Lumbar back pain     Past Medical History:   Diagnosis Date    Acute UTI (urinary tract infection)     Diabetes mellitus     Hypertension     Renal insufficiency      Past Surgical History:   Procedure Laterality Date    HAND SURGERY Left     FX    OTHER SURGICAL HISTORY Bilateral     multiple surgeries for fx  - both legs    OTHER SURGICAL HISTORY      blaze carotid procedure     PT Assessment (Last 12 Hours)       PT Evaluation and Treatment       Row Name 25 0850          Physical Therapy Time and Intention    Subjective Information no complaints  -LH     Document Type therapy note (daily note)  -LH     Mode of Treatment physical therapy  -LH     Patient Effort good  -LH     Symptoms Noted During/After Treatment fatigue  -LH     Comment Co treatment medically appropriate and necessary due to patient acuity level, activity tolerance and  safety of patient and staff. Treatment is focusing on progression of care and goals established in the POC.  -       Row Name 06/20/25 0850          General Information    Patient Profile Reviewed yes  -     Patient Observations alert;cooperative;agree to therapy  -     General Observations of Patient Pt supine in bed no acute distress  -     Existing Precautions/Restrictions fall  -     Barriers to Rehab previous functional deficit  -[facility] Name 06/20/25 0850          Living Environment    Current Living Arrangements home  -[facility] Name 06/20/25 0850          Pain    Pretreatment Pain Rating 0/10 - no pain  -     Posttreatment Pain Rating 0/10 - no pain  -[facility] Name 06/20/25 0850          Cognition    Affect/Mental Status (Cognition) WFL;flat/blunted affect  -     Orientation Status (Cognition) oriented to;person;place  -[facility] Name 06/20/25 0850          Mobility    Additional Documentation Wheelchair Mobility/Management (Group)  -[facility] Name 06/20/25 0850          Bed Mobility    Supine-Sit [place] (Bed Mobility) moderate assist (50% patient effort)  -     Sit-Supine [place] (Bed Mobility) not tested  -     Assistive Device (Bed Mobility) bed rails;head of bed elevated  -[facility] Name 06/20/25 0850          Transfers    Transfers sit-stand transfer;stand-sit transfer  -[facility] Name 06/20/25 0850          Sit-Stand Transfer    Sit-Stand [place] (Transfers) moderate assist (50% patient effort);maximum assist (25% patient effort);2 person assist  -     Assistive Device (Sit-Stand Transfers) wheelchair;walker, front-wheeled  -[facility] Name 06/20/25 0850          Stand-Sit Transfer    Stand-Sit [place] (Transfers) moderate assist (50% patient effort);maximum assist (25% patient effort);2 person assist  -     Assistive Device (Stand-Sit Transfers) wheelchair;walker, front-wheeled  -[facility] Name 06/20/25 0850          Wheelchair  Transfer    Type (Wheelchair Transfer) stand pivot/stand step  -     [place] Level (Wheelchair Transfer) moderate assist (50% patient effort);maximum assist (25% patient effort);2 person assist;set up;verbal cues;nonverbal cues (demo/gesture)  -       Row Name 06/20/25 0850          Wheelchair Mobility/Management    Mobility Activities (Wheelchair) steering;turning;backward propulsion;forward propulsion  -     Backward Propulsion [place] (Wheelchair) minimum assist (75% patient effort);set up;nonverbal cues (demo/gesture);verbal cues  -     Steering [place] (Wheelchair) minimum assist (75% patient effort)  -     Turning [place] (Wheelchair) minimum assist (75% patient effort)  -     Distance Propelled in Feet (Wheelchair) 10  -       Row Name 06/20/25 0850          Safety Issues/Impairments Affecting Functional Mobility    Safety Issues Affecting Function (Mobility) insight into deficits/self-awareness;judgment  -     Impairments Affecting Function (Mobility) balance;cognition;endurance/activity tolerance;grasp;strength  -       Row Name 06/20/25 0850          Balance    Static Sitting Balance standby assist  -     Dynamic Standing Balance moderate assist;2-person assist  -     Position/Device Used, Standing Balance supported  -     Balance Interventions sitting;static;minimal challenge;UE activity with balance activity  -       Row Name 06/20/25 0850          Plan of Care Review    Plan of Care Reviewed With patient  -     Progress improving  -     Outcome Evaluation Pt agreeable and cooperative to skilled PT. Pt required Mod A bed mobility, Pt transfered OOB to WC mod/max A x 2 stand pivot into his personal WC. Leg rests provided for pts WC. Pt practiced turning and propulsion in WC Min A . Anticipate DC to SNU as pt would benefit from skilled PT services for increased independent w functional mobilization including bed mobility, tsfs and WC mobilization.  -        Row Name 06/20/25 0850          Positioning and Restraints    Pre-Treatment Position in bed  -     Post Treatment Position wheelchair  -     In Wheelchair sitting;call light within reach;encouraged to call for assist;notified nsg;with OT  -               User Key  (r) = Recorded By, (t) = Taken By, (c) = Cosigned By      Initials Name Provider Type     Angy Curiel, PT Physical Therapist                    Physical Therapy Education       Title: PT OT SLP Therapies (In Progress)       Topic: Physical Therapy (In Progress)       Point: Mobility training (In Progress)       Learning Progress Summary            Patient Acceptance, E, NR by  at 6/20/2025 0857    Acceptance, E, NR by  at 6/18/2025 1637                      Point: Home exercise program (In Progress)       Learning Progress Summary            Patient Acceptance, E, NR by  at 6/20/2025 0857    Acceptance, E, NR by  at 6/18/2025 1637                      Point: Body mechanics (In Progress)       Learning Progress Summary            Patient Acceptance, E, NR by  at 6/20/2025 0857    Acceptance, E, NR by  at 6/18/2025 1637                      Point: Precautions (In Progress)       Learning Progress Summary            Patient Acceptance, E, NR by  at 6/20/2025 0857    Acceptance, E, NR by  at 6/18/2025 1637                                      User Key       Initials Effective Dates Name Provider Type [facility] 06/16/21 -  Angy Curiel, PT Physical Therapist PT                  PT Recommendation and Plan  Anticipated Discharge Disposition (PT): skilled nursing facility  Planned Therapy Interventions (PT): balance training, bed mobility training, gait training, home exercise program, ROM (range of motion), stair training, stretching, strengthening, transfer training, wheelchair management/propulsion training  Therapy Frequency (PT): 3 times/wk  Plan of Care Reviewed With: patient  Progress: improving  Outcome Evaluation: Pt  agreeable and cooperative to skilled PT. Pt required Mod A bed mobility, Pt transfered OOB to WC mod/max A x 2 stand pivot into his personal WC. Leg rests provided for pts WC. Pt practiced turning and propulsion in WC Min A . Anticipate DC to SNU as pt would benefit from skilled PT services for increased independent w functional mobilization including bed mobility, tsfs and WC mobilization.   Outcome Measures       Row Name 06/20/25 0856 06/18/25 1600          How much help from another person do you currently need...    Turning from your back to your side while in flat bed without using bedrails? 2  -LH 2  -LH     Moving from lying on back to sitting on the side of a flat bed without bedrails? 2  -LH 2  -LH     Moving to and from a bed to a chair (including a wheelchair)? 2  - 2  -LH     Standing up from a chair using your arms (e.g., wheelchair, bedside chair)? 2  - 2  -LH     Climbing 3-5 steps with a railing? 1  - 1  -LH     To walk in hospital room? 1  - 1  -     AM-PAC 6 Clicks Score (PT) 10  - 10  -               User Key  (r) = Recorded By, (t) = Taken By, (c) = Cosigned By      Initials Name Provider Type     Angy Curiel PT Physical Therapist                     Time Calculation:    PT Charges       Row Name 06/20/25 0858             Time Calculation    Start Time 0800  -      Stop Time 0830  -      Time Calculation (min) 30 min  -      PT Received On 06/20/25  -      PT - Next Appointment 06/23/25  -                User Key  (r) = Recorded By, (t) = Taken By, (c) = Cosigned By      Initials Name Provider Type     Angy Curiel, PT Physical Therapist                  Therapy Charges for Today       Code Description Service Date Service Provider Modifiers Qty    05596051620 HC PT THER PROC EA 15 MIN 6/20/2025 Angy Curiel, PT GP 1    39165769674 HC PT THERAPEUTIC ACT EA 15 MIN 6/20/2025 Angy Curiel, PT GP 1    56977363994 HC PT THER PROC EA 15 MIN 6/20/2025 Angy Curiel, PT  GP 1    [id number]  PT THERAPEUTIC ACT EA 15 MIN 6/20/2025 Angy Curiel, PT GP 1            PT G-Codes  Outcome Measure Options: AM-PAC 6 Clicks Daily Activity (OT)  AM-PAC 6 Clicks Score (PT): 10  AM-PAC 6 Clicks Score (OT): 13    Angy Curiel, PT  6/20/2025

## 2025-06-20 NOTE — PLAN OF CARE
Goal Outcome Evaluation:  Plan of Care Reviewed With: patient           Outcome Evaluation: Pt admit from home with acute back pain.  Pt lives with brother and [employer] and uses a w/c at baseline due to h/o CVA.   Pt with impaired processing/responses and difficult to clarify how much assist family provides with ADL. Today pt presents sitting EOB with PT. Pt requires assist to reji pants, 2 person assist to stand and hike pants and xfer to w/c.   Pt with impaired ROM/strength R UE and uses L UE dominantly since CVA.  Pt may benefit from SNU stay to work on strength, mobility and activity tolerance.    Anticipated Discharge Disposition (OT): skilled nursing facility

## 2025-06-20 NOTE — DISCHARGE SUMMARY
Please use as progress noted for 25  Patient Name: Benja Macedo  : 1940  MRN: [id number]    Date of Admission: 2025  Date of Discharge:  2025  Primary Care Physician: Yo Mckeon, ANN MARIE, APRN      Chief Complaint:   Back Pain and Weakness - Generalized      Discharge Diagnoses     Active Hospital Problems    Diagnosis  POA   • **Lumbar back pain [M54.50]  Yes   • Stage 3b chronic kidney disease [N18.32]  Yes   • Type 2 diabetes mellitus [E11.9]  Yes   • Gastroesophageal reflux disease [K21.9]  Yes   • Hyperlipidemia [E78.5]  Yes   • Benign essential hypertension [I10]  Yes      Resolved Hospital Problems   No resolved problems to display.        Hospital Course     Mr. Macedo is a 84 y.o. male with a history of chronic back pain, type 2 diabetes mellitus, hypertension, CKD, CVA, and disability who presented to [facility] initially complaining of severe pain with any type of movement in his low over the last 4 to 5 days..  Please see the admitting history and physical for further details.  He was found to have chronic thoracic, and lumbar spine abnormalities.  He has had chronic low back pain, muscle spasms that have increased over the last week.  He has consistently denied radiation of pain to bilateral lower extremities.  Any loss of bowel or bladder, or saddle anesthesia.  Not had any prior surgical interventions. Neurosurgery has evaluated, MRI of lumbar and thoracic spine shows no acute abnormalities.  Neurosurgery recommended no additional evaluation or workup at this time. He has been evaluated by PT and OT with recommended discharge home with family, home health or SNF. He has significant deconditioning of upper extremities, and would benefit from additional physical therapy to assist with strengthening, and mobility/transfers. He is medically stable for discharge to SNF, for continued PT/OT.  His creatinine has remained stable, near his baseline throughout his  hospitalization.  Hemoglobin A1c was 6.30.  He has been advised to follow-up with his primary care provider for further surveillance of elevated hemoglobin A1c.  He has been advised to take all of his medications as they have been prescribed to him and to attend all follow-up appointments.  He has been told to report the emergency room if he experiences any new or worsening symptoms.     Day of Discharge     Subject: He is eager to be discharged to SNF, and is hopeful that he will gain some strength, and mobility with physical therapy/Occupational Therapy.  He offers no new complaints at this time, states his pain has been well-controlled    Physical Exam:  Temp:  [98.1 °F (36.7 °C)-98.4 °F (36.9 °C)] 98.4 °F (36.9 °C)  Heart Rate:  [57-69] 57  Resp:  [16] 16  BP: (163-191)/(60-73) 166/65  Body mass index is 25.88 kg/m².  Physical Exam  Vitals and nursing note reviewed.   Constitutional:       Appearance: He is ill-appearing.   HENT:      Head: Atraumatic.      Mouth/Throat:      Mouth: Mucous membranes are dry.   Eyes:      Conjunctiva/sclera: Conjunctivae normal.   Cardiovascular:      Rate and Rhythm: Normal rate and regular rhythm.      Pulses: Normal pulses.           Radial pulses are 2+ on the right side and 2+ on the left side.      Heart sounds: Normal heart sounds.   Pulmonary:      Effort: Pulmonary effort is normal.      Breath sounds: Normal breath sounds.   Abdominal:      General: Bowel sounds are normal.      Palpations: Abdomen is soft.   Musculoskeletal:      Right lower leg: No edema.      Left lower leg: No edema.   Skin:     General: Skin is warm and dry.      Coloration: Skin is pale.   Neurological:      General: No focal deficit present.      Mental Status: He is alert and oriented to person, place, and time. Mental status is at baseline.   Psychiatric:         Mood and Affect: Mood normal.         Consultants     Consult Orders (all) (From admission, onward)       Start     Ordered     06/18/25 1224  Inpatient Case Management  Consult  Once        Comments: Will need placement   Provider:  (Not yet assigned)    06/18/25 1223    06/16/25 1841  Inpatient Neurosurgery Consult  Once        Specialty:  Neurosurgery  Provider:  Cristofer Montanez MD    06/16/25 1843    06/16/25 1256  [facility] (on-call MD unless specified) Details  Once        Specialty:  Hospitalist  Provider:  Tony Charles MD    06/16/25 1255                  Procedures     * Surgery not found *    Imaging Results (All)       Procedure Component Value Units Date/Time    MRI Lumbar Spine Without Contrast [[id number]] Collected: 06/17/25 1521     Updated: 06/18/25 0632    Narrative:      MRI OF THE THORACIC AND LUMBAR SPINE WITHOUT CONTRAST 06/17/2025     CLINICAL HISTORY: This is an 84-year-old male patient who has chronic  mid and lower back pain and generalized weakness.     MRI OF THE THORACIC SPINE TECHNIQUE: Sagittal T1, proton density and  fat-suppressed T2 weighted images were obtained of the thoracic spine.   In addition, axial T1 and T2 weighted images were obtained from C7 to T7  and then from T7 to T12.      There are no prior thoracic spine MRIs for comparison. This is  correlated to a prior chest, abdomen and pelvic CT on 07/06/2023.     FINDINGS: Thoracic cord is normal in signal intensity and the conus  terminates at the L1 lumbar level, which is normal.      At T1-2, there is minimal right posterior lateral endplate spurring.   The facets are normal. There is no canal or foraminal narrowing.     At T2-3, T3-4, T4-5, T5-6, and T6-7, the posterior disc margin and  facets are normal with no canal or foraminal narrowing.     At T7-8, there is a tiny posterior spur that minimally indents the  ventral thecal sac.  The facets are normal. There is no canal or  foraminal narrowing.     At T8-9, the disc space and facets are normal with no canal or foraminal  narrowing.     At T9-10, there is mild right  facet overgrowth and minimal right  posterolateral endplate spurring minimally narrowing the right lateral  aspect of the canal.  There is no foraminal narrowing.     At T10-11, there is mild left and mild-to-moderate right facet  overgrowth with a tiny amount of fluid in the right facets.  The facet  with adjacent ligamentum flavum thickening indent the right and left  posterior lateral aspect of the thecal sac, and there is minimal  posterior spurring and there is mild-to-moderate canal narrowing and  there is facet spurring into the foramen and there is mild bilateral  foraminal narrowing.     At T11-12 and T12-L1, the posterior disc margin and facets are normal  with no canal or foraminal narrowing.     The thoracic vertebral body heights are well-maintained and the marrow  signal intensity in the thoracic spine is normal.  The paravertebral  soft tissue structures are normal.       Impression:         1. There is mild thoracic spondylosis most pronounced at T10-11 where  there is mild left and mild-to-moderate right facet overgrowth, a tiny  amount of fluid in the facets, and adjacent ligamentum flavum thickening  indent the right and left posterior aspect of the thecal sac.  Minimal  posterior spurring indents the ventral thecal sac and there is at least  mild up to mild-to-moderate narrowing of the thecal sac and mild  bilateral foraminal narrowing at T10-11. The remainder of the thoracic  spine MRI is essentially normal.        MRI OF THE LUMBAR SPINE TECHNIQUE: Sagittal T1, proton density and  fat-suppressed T2 weighted images were obtained of the lumbar spine.  In  addition, axial T2 weighted images were obtained from T12 to S2 and thin  cut axial T1 and T2 weighted images were obtained angled through the  interspaces from L2 to S1.     FINDINGS: The distal thoracic cord and the conus is normal in signal  intensity.  The conus terminates at the L1-2 interspace level, which is  normal.     At T12-L1, the  posterior disc margin and facets are normal with no canal  or foraminal narrowing.     At L1-2, there is mild disc space narrowing, degenerative endplate  changes, mild diffuse posterior disc osteophyte complex eccentric left  paracentral posterolaterally indents the left ventral thecal sac and  there is mild bilateral facet overgrowth and some adjacent ligamentum  flavum thickening, and there is at least mild-to-moderate up to moderate  generalized narrowing of the thecal sac.  There is mild bilateral  foraminal narrowing.     At L2-3, there is mild disc desiccation, a minimal posterior disc bulge  and mild facet overgrowth.  There is essentially no canal narrowing.   There is no right foraminal narrowing.  There is minimal left foraminal  narrowing.      At L3-4, there is mild disc space narrowing, degenerative endplate  changes, and a mild diffuse posterior disc osteophyte complex indents  the ventral thecal sac.  There is mild-to-moderate facet overgrowth with  adjacent ligamentum flavum thickening that indents the right and left  posterior aspect of the thecal sac and there is mild-to-moderate  generalized narrowing of the thecal sac spurring into the foramen and  mild bilateral foraminal narrowing.     At L4-5, there is moderate bilateral facet overgrowth and a 3 mm  degenerative anterolisthesis of L4 with respect to L5.  There is mild  canal narrowing.  There is slight narrowing of the left lateral recess.   No right lateral recess narrowing.  There is some spurring into the  foramina with mild right and mild-to-moderate left foraminal narrowing  with endplate and facet spurs abutting the anterior and posterior aspect  of the left L4 nerve root within the lateral aspect of the left foramen.     At L5-S1, the posterior disc margin is normal.  There is mild left and  mild-to-moderate right facet overgrowth.  There is no canal or lateral  recess or foraminal narrowing.     The lumbar vertebral body heights are  well-maintained.  The marrow  signal intensity in the lumbar spine is normal. The paravertebral soft  tissue structures are normal.     IMPRESSION:  1. There is lumbar spondylosis as described above.     2. At L1-2, there is mild bilateral facet overgrowth and ligamentum  flavum thickening along the anteromedial margins of the facets that  slightly indents the right and left posterior aspect of the thecal sac  and there is diffuse posterior disc osteophyte complex eccentric to left  paracentrally indents the ventral thecal sac.  There is up to moderate  generalized narrowing of the thecal sac and mild bilateral foraminal  narrowing at L1-2.     3. At L3-4, there is mild-to-moderate bilateral facet overgrowth.   Ligamentum flavum thickening indents the right and left posterior thecal  sac. A diffuse posterior disc osteophyte complex indents the ventral  thecal sac and there is at least mild up to mild-to-moderate generalized  narrowing of the thecal sac.  There is mild bilateral bony foraminal  narrowing.     4. At L4-5, there is moderate bilateral facet overgrowth and there is  mild narrowing of the thecal sac and slight narrowing of the left  lateral recess and there is mild right foraminal narrowing.  There is  endplate spurring into the inferior foramen and synovial thickening  along the anterior superior facets extends into the posterior foramen.   There is at least mild-to-moderate up to moderate narrowing of the  lateral aspect of the left foramen with endplate and facet spurs  abutting the anterior and posterior aspects of the exiting left L4 nerve  root.  The remainder of the lumbar spine MRI is unremarkable.      This report was finalized on 6/18/2025 6:29 AM by Dr. Mac Oconnor M.D  on Workstation: [id number]       MRI Thoracic Spine Without Contrast [[id number]] Collected: 06/17/25 1521     Updated: 06/18/25 0632    Narrative:      MRI OF THE THORACIC AND LUMBAR SPINE WITHOUT CONTRAST 06/17/2025      CLINICAL HISTORY: This is an 84-year-old male patient who has chronic  mid and lower back pain and generalized weakness.     MRI OF THE THORACIC SPINE TECHNIQUE: Sagittal T1, proton density and  fat-suppressed T2 weighted images were obtained of the thoracic spine.   In addition, axial T1 and T2 weighted images were obtained from C7 to T7  and then from T7 to T12.      There are no prior thoracic spine MRIs for comparison. This is  correlated to a prior chest, abdomen and pelvic CT on 07/06/2023.     FINDINGS: Thoracic cord is normal in signal intensity and the conus  terminates at the L1 lumbar level, which is normal.      At T1-2, there is minimal right posterior lateral endplate spurring.   The facets are normal. There is no canal or foraminal narrowing.     At T2-3, T3-4, T4-5, T5-6, and T6-7, the posterior disc margin and  facets are normal with no canal or foraminal narrowing.     At T7-8, there is a tiny posterior spur that minimally indents the  ventral thecal sac.  The facets are normal. There is no canal or  foraminal narrowing.     At T8-9, the disc space and facets are normal with no canal or foraminal  narrowing.     At T9-10, there is mild right facet overgrowth and minimal right  posterolateral endplate spurring minimally narrowing the right lateral  aspect of the canal.  There is no foraminal narrowing.     At T10-11, there is mild left and mild-to-moderate right facet  overgrowth with a tiny amount of fluid in the right facets.  The facet  with adjacent ligamentum flavum thickening indent the right and left  posterior lateral aspect of the thecal sac, and there is minimal  posterior spurring and there is mild-to-moderate canal narrowing and  there is facet spurring into the foramen and there is mild bilateral  foraminal narrowing.     At T11-12 and T12-L1, the posterior disc margin and facets are normal  with no canal or foraminal narrowing.     The thoracic vertebral body heights are  well-maintained and the marrow  signal intensity in the thoracic spine is normal.  The paravertebral  soft tissue structures are normal.       Impression:         1. There is mild thoracic spondylosis most pronounced at T10-11 where  there is mild left and mild-to-moderate right facet overgrowth, a tiny  amount of fluid in the facets, and adjacent ligamentum flavum thickening  indent the right and left posterior aspect of the thecal sac.  Minimal  posterior spurring indents the ventral thecal sac and there is at least  mild up to mild-to-moderate narrowing of the thecal sac and mild  bilateral foraminal narrowing at T10-11. The remainder of the thoracic  spine MRI is essentially normal.        MRI OF THE LUMBAR SPINE TECHNIQUE: Sagittal T1, proton density and  fat-suppressed T2 weighted images were obtained of the lumbar spine.  In  addition, axial T2 weighted images were obtained from T12 to S2 and thin  cut axial T1 and T2 weighted images were obtained angled through the  interspaces from L2 to S1.     FINDINGS: The distal thoracic cord and the conus is normal in signal  intensity.  The conus terminates at the L1-2 interspace level, which is  normal.     At T12-L1, the posterior disc margin and facets are normal with no canal  or foraminal narrowing.     At L1-2, there is mild disc space narrowing, degenerative endplate  changes, mild diffuse posterior disc osteophyte complex eccentric left  paracentral posterolaterally indents the left ventral thecal sac and  there is mild bilateral facet overgrowth and some adjacent ligamentum  flavum thickening, and there is at least mild-to-moderate up to moderate  generalized narrowing of the thecal sac.  There is mild bilateral  foraminal narrowing.     At L2-3, there is mild disc desiccation, a minimal posterior disc bulge  and mild facet overgrowth.  There is essentially no canal narrowing.   There is no right foraminal narrowing.  There is minimal left  foraminal  narrowing.      At L3-4, there is mild disc space narrowing, degenerative endplate  changes, and a mild diffuse posterior disc osteophyte complex indents  the ventral thecal sac.  There is mild-to-moderate facet overgrowth with  adjacent ligamentum flavum thickening that indents the right and left  posterior aspect of the thecal sac and there is mild-to-moderate  generalized narrowing of the thecal sac spurring into the foramen and  mild bilateral foraminal narrowing.     At L4-5, there is moderate bilateral facet overgrowth and a 3 mm  degenerative anterolisthesis of L4 with respect to L5.  There is mild  canal narrowing.  There is slight narrowing of the left lateral recess.   No right lateral recess narrowing.  There is some spurring into the  foramina with mild right and mild-to-moderate left foraminal narrowing  with endplate and facet spurs abutting the anterior and posterior aspect  of the left L4 nerve root within the lateral aspect of the left foramen.     At L5-S1, the posterior disc margin is normal.  There is mild left and  mild-to-moderate right facet overgrowth.  There is no canal or lateral  recess or foraminal narrowing.     The lumbar vertebral body heights are well-maintained.  The marrow  signal intensity in the lumbar spine is normal. The paravertebral soft  tissue structures are normal.     IMPRESSION:  1. There is lumbar spondylosis as described above.     2. At L1-2, there is mild bilateral facet overgrowth and ligamentum  flavum thickening along the anteromedial margins of the facets that  slightly indents the right and left posterior aspect of the thecal sac  and there is diffuse posterior disc osteophyte complex eccentric to left  paracentrally indents the ventral thecal sac.  There is up to moderate  generalized narrowing of the thecal sac and mild bilateral foraminal  narrowing at L1-2.     3. At L3-4, there is mild-to-moderate bilateral facet overgrowth.   Ligamentum flavum  thickening indents the right and left posterior thecal  sac. A diffuse posterior disc osteophyte complex indents the ventral  thecal sac and there is at least mild up to mild-to-moderate generalized  narrowing of the thecal sac.  There is mild bilateral bony foraminal  narrowing.     4. At L4-5, there is moderate bilateral facet overgrowth and there is  mild narrowing of the thecal sac and slight narrowing of the left  lateral recess and there is mild right foraminal narrowing.  There is  endplate spurring into the inferior foramen and synovial thickening  along the anterior superior facets extends into the posterior foramen.   There is at least mild-to-moderate up to moderate narrowing of the  lateral aspect of the left foramen with endplate and facet spurs  abutting the anterior and posterior aspects of the exiting left L4 nerve  root.  The remainder of the lumbar spine MRI is unremarkable.      This report was finalized on 6/18/2025 6:29 AM by Dr. Mac Oconnor M.D  on Workstation: [id number]       CT Lumbar Spine Without Contrast [[id number]] Collected: 06/16/25 1313     Updated: 06/16/25 1811    Narrative:      EMERGENCY CT SCAN OF THE LUMBAR SPINE WITHOUT CONTRAST ON 06/16/2025     CLINICAL HISTORY: This is an 84-year-old male patient, who complains of  acute low back pain, primarily in right lower back that results in some  generalized weakness.     TECHNIQUE: Spiral CT images were obtained from the T10-T11 thoracic disc  space level down to the superior body of the S4 sacral segment, and the  images were reformatted and are submitted in 3 mm thick axial CT  sections with soft tissue algorithm, and 1 mm thick axial CT sections  with high resolution bone algorithm, and 2 mm thick sagittal and coronal  reconstructions were performed and submitted in soft tissue algorithm.     FINDINGS: At T10-T11, there is only partial visualization of the T10-T11  disc space level and there is some motion artifact. There is a  large  right anterior marginal osteophyte that contains some vacuum disc  phenomenon from the anterior disc space insinuating between the anterior  marginal osteophytes. There is blurring of the posterior disc margin and  the canal and foramina at T10-T11 are not adequately assessed.     At T11-T12, there is minimal posterior disc bulge. The facets are  normal. I see no canal or foraminal narrowing.     At T12-L1, the posterior disc margin and facets are normal with no canal  or foraminal narrowing.     At L1-L2, there is minimal bilateral facet overgrowth, some ligamentum  flavum thickening off the anteromedial facets. There is mild diffuse  posterior disc bulge and there is up to mild-to-moderate narrowing of  the thecal sac. There is mild bilateral foraminal narrowing.     At L2-L3, there is a minimal posterior disc bulge. There is mild left  and minimal right facet overgrowth. There is mild narrowing of the  thecal sac and mild foraminal narrowing.     At L3-L4, there is mild bilateral facet overgrowth. There is a 2 mm  retrolisthesis of L3 with respect to L4, and a mild diffuse posterior  disc bulge. There is at least mild up to mild-to-moderate narrowing of  the thecal sac and there is mild-to-moderate bilateral foraminal  narrowing.     At L4-L5, there is moderate right and moderate-to-severe left facet  overgrowth. There is a 2-3 mm degenerative anterolisthesis of L4 with  respect to L5. There is only mild narrowing of the thecal sac. There is  slight narrowing of the left lateral recess. There is minimal right  foraminal narrowing. There is some bulging disc material into the  inferior left foramen and to the far left laterally, synovial thickening  off the facets extending into the posterior left foramen. There is  mild-to-moderate left foraminal narrowing.     At L5-S1, there is mild left and there is mild-to-moderate right facet  overgrowth. The posterior disc margin is normal. There is no canal  or  lateral recess or foraminal narrowing.     No acute fracture is seen in the lumbar spine.        Impression:      1. No convincing acute fracture is seen in the lumbar spine.     2. There is lumbar spondylosis as described above. If clinical symptoms  warrant an MRI of the lumbar spine could be obtained for a more  comprehensive assessment.     3. The images start superiorly at the level of the T10-T11 thoracic  interspace level and there is artifact streaking through the canal and  along the posterior disc margin, and the soft tissue axial images  suggests possible disc herniation and canal narrowing, but I think this  is probably artifact. If there are symptoms referable to the lower  thoracic cord, CT or MRI of the thoracic spine could be obtained to more  completely assess this level. The results were communicated to Dr. Chauhan in the ER by telephone on 06/16/2025 at 12:30 p.m.     Radiation dose reduction techniques were utilized, including automated  exposure control and exposure modulation based on body size.        This report was finalized on 6/16/2025 6:08 PM by Dr. Mac Oconnor M.D  on Workstation: [id number]                 Pertinent Labs     Results from last 7 days   Lab Units 06/20/25  0209 06/19/25  0106 06/17/25  0440 06/16/25  1004   WBC 10*3/mm3 9.83 10.18 10.56 10.69   HEMOGLOBIN g/dL 13.5 13.4 13.0 15.5   PLATELETS 10*3/mm3 228 214 222 237     Results from last 7 days   Lab Units 06/19/25  0106 06/17/25  0440 06/16/25  1004   SODIUM mmol/L 142 139 141   POTASSIUM mmol/L 4.2 4.1 4.9   CHLORIDE mmol/L 106 107 105   CO2 mmol/L 22.4 23.0 22.7   BUN mg/dL 28.0* 25.0* 25.0*   CREATININE mg/dL 1.56* 1.62* 2.07*   GLUCOSE mg/dL 95 99 141*   EGFR mL/min/1.73 44.1* 42.1* 31.4*     Results from last 7 days   Lab Units 06/16/25  1004   ALBUMIN g/dL 4.2   BILIRUBIN mg/dL 0.7   ALK PHOS U/L 97   AST (SGOT) U/L 15   ALT (SGPT) U/L 32     Results from last 7 days   Lab Units 06/19/25  0101  "06/17/25  0440 06/16/25  1004   CALCIUM mg/dL 8.8 8.9 9.8   ALBUMIN g/dL  --   --  4.2               Invalid input(s): \"LDLCALC\"          Test Results Pending at Discharge     Pending Results       None              Discharge Details        Discharge Medications        New Medications        Instructions Start Date   HYDROcodone-acetaminophen 5-325 MG per tablet  Commonly known as: NORCO   1 tablet, Oral, Every 4 Hours PRN      Lidocaine 4 %   1 patch, Transdermal, Every 24 Hours Scheduled, Remove & Discard patch within 12 hours or as directed by MD             Continue These Medications        Instructions Start Date   acetaminophen 500 MG tablet  Commonly known as: TYLENOL   1,000 mg, Nightly PRN      ASPIRIN 81 PO   81 mg, Daily      atorvastatin 20 MG tablet  Commonly known as: LIPITOR   20 mg, Oral, Daily      carvedilol 6.25 MG tablet  Commonly known as: COREG   6.25 mg, Oral, 2 Times Daily With Meals      coenzyme Q10 100 MG capsule   100 mg, Daily      fish oil 1000 MG capsule capsule   Daily With Breakfast      fluticasone 50 MCG/ACT nasal spray  Commonly known as: FLONASE   2 sprays, Daily PRN      multivitamin with minerals tablet tablet   Daily      vitamin B-12 1000 MCG tablet  Commonly known as: CYANOCOBALAMIN   1,000 mcg, Daily      vitamin C 500 MG tablet  Commonly known as: ASCORBIC ACID   500 mg, Daily               No Known Allergies    Discharge Disposition:  Skilled Nursing Facility (DC - External)      Discharge Diet:  Diet Order   Procedures   • Diet: Regular/House, Cardiac; Healthy Heart (2-3 Na+); Fluid Consistency: Thin (IDDSI 0)       Discharge Activity: As tolerated  CODE STATUS:    Code Status and Medical Interventions: CPR (Attempt to Resuscitate); Full Support   Ordered at: 06/16/25 1842     Code Status (Patient has no pulse and is not breathing):    CPR (Attempt to Resuscitate)     Medical Interventions (Patient has pulse or is breathing):    Full Support       Future Appointments "   Date Time Provider Department Center   8/14/2025  9:15 AM Yo Mckeon DNP, APRN [facility]  [facility]      Contact information for follow-up providers       Yo Mckeon DNP, APRN .    Specialty: Internal Medicine  Contact information:  [address]  [address] [address]  [phone]                       Contact information for after-discharge care       Destination       [facility] .    Service: Skilled Nursing  Contact information:  [address] [facility] [address]  [phone]                                   Time Spent on Discharge:  Greater than 30 minutes      EVELIA Landaverde  [facility]  06/20/25  15:11 EDT

## 2025-06-20 NOTE — PLAN OF CARE
Goal Outcome Evaluation:         Patient has rested well overnight. No acute events. Mild back pain. Patient plans to d/c today.

## 2025-06-20 NOTE — PROGRESS NOTES
Discharge Planning Assessment  [facility]     Patient Name: Benja Macedo  MRN: [id number]  Today's Date: 6/20/2025    Admit Date: 6/16/2025    Plan: [facility] skilled rehab; will be discharged today and transported by [facility].   Discharge Needs Assessment    No documentation.                  Discharge Plan       Row Name 06/20/25 0951       Plan    [facility]; will be discharged today and transported by [facility].    Final Discharge Disposition Code 03 - skilled nursing facility (SNF)    Final Note [facility]  has accepted for skilled rehab using discharge solutions, insurance precert will be started this am, Physical Therapy and Occuptional Therapy notes are in from this am therapy session. Call placed to his brother Auther [phone] he is agreeable with this discharge plan. [facility] Mindi DAVID                  Continued Care and Services - Admitted Since 6/16/2025       Destination Coordination complete.      Service Provider Request Status Services Address Phone Fax Patient Preferred    [facility]  Selected Skilled Nursing [address] [facility] [id number] [phone] --    [facility] Declined  Bed not available -- [address] [facility] [address] [phone] --    [facility] Declined  Clinical Denial -- [address] [facility] [address] [phone] --    [facility] Declined  Bed not available -- [address] [facility] [address] [phone] --                  Expected Discharge Date and Time       Expected Discharge Date Expected Discharge Time    Jun 19, 2025            Demographic Summary    No documentation.                  Functional Status    No documentation.                  Psychosocial    No documentation.                   Abuse/Neglect    No documentation.                  Legal    No documentation.                  Substance Abuse    No documentation.                  Patient Forms    No documentation.                     Cherie Ashley RN

## 2025-06-20 NOTE — THERAPY EVALUATION
Patient Name: Benja Macedo  : 1940    MRN: [id number]                              Today's Date: 2025       Admit Date: 2025    Visit Dx:     ICD-10-CM ICD-9-CM   1. Lumbar back pain  M54.50 724.2   2. Generalized weakness  R53.1 780.79   3. Difficulty walking  R26.2 719.7   4. Chronic kidney disease, unspecified CKD stage  N18.9 585.9     Patient Active Problem List   Diagnosis    Benign essential hypertension    Gastroesophageal reflux disease    Hyperglycemia    Hyperlipidemia    Nocturia    Renal insufficiency    Cerebrovascular accident - history of    Type 2 diabetes mellitus    Benign hypertension with chronic kidney disease    Stage 3b chronic kidney disease    Edema of lower extremity    Dyslipidemia    History of cerebrovascular accident    Pain of left shoulder joint on movement    Weakness    UTI (urinary tract infection)    Acute UTI (urinary tract infection)    Nodule of upper lobe of left lung    Abnormal CT of the abdomen    Abnormal CT of the head    Vitamin D deficiency    Lumbar back pain     Past Medical History:   Diagnosis Date    Acute UTI (urinary tract infection)     Diabetes mellitus     Hypertension     Renal insufficiency      Past Surgical History:   Procedure Laterality Date    HAND SURGERY Left     FX    OTHER SURGICAL HISTORY Bilateral     multiple surgeries for fx  - both legs    OTHER SURGICAL HISTORY      blaze carotid procedure      General Information       Row Name 25 0845          OT Time and Intention    Subjective Information no complaints  -LE     Document Type evaluation;therapy note (daily note)  -LE     Mode of Treatment occupational therapy;physical therapy;co-treatment  -LE     Patient Effort good  -LE     Symptoms Noted During/After Treatment fatigue  -LE       Row Name 25 0845          General Information    Patient Profile Reviewed yes  -LE     Prior Level of Function ADL's;transfer  w/c level, pt reports family assist with xfer  "to w/c and takes w/c to toilet for BM.  Pt reports stands and hikes pants but unsure reliability of this.  -LE     Existing Precautions/Restrictions fall  ISAIAH has seen, no plans for intervention and have signed off.  -LE     Barriers to Rehab previous functional deficit;cognitive status  -LE       Row Name 06/20/25 0803          Living Environment    Current Living Arrangements home  -LE     People in Home sibling(s)  -LE       Row Name 06/20/25 0845          Cognition    Orientation Status (Cognition) oriented to;person;place;situation  aware in hospital and admit with back pain.  reports \"I don't know\" when asked month/year. overall slow processing, difficult to answer clarifying questions about how ADL performed at home.  -LE       Row Name 06/20/25 0872          Safety Issues/Impairments Affecting Functional Mobility    Safety Issues Affecting Function (Mobility) judgment;insight into deficits/self-awareness;problem-solving  -LE     Impairments Affecting Function (Mobility) balance;cognition;endurance/activity tolerance;grasp;strength  -LE     Comment, Safety Issues/Impairments (Mobility) non skid socks and gait belt worn.  Co treat utilized for pt/staff safety with goals to maximize mobility efforts.  -LE               User Key  (r) = Recorded By, (t) = Taken By, (c) = Cosigned By      Initials Name Provider Type    Angy Rodriguez OTR/L, CSRS Occupational Therapist                     Mobility/ADL's       Row Name 06/20/25 0816          Bed Mobility    Bed Mobility supine-sit;sit-supine;scooting/bridging  -LE     Comment, (Bed Mobility) sit EOB when arrive.  ended session in w/c.  RN present and observes transfer to w/c.  -LE       Row Name 06/20/25 0861          Transfers    Transfers sit-stand transfer;stand-sit transfer;bed-chair transfer;toilet transfer  -LE       Row Name 06/20/25 0848          Sit-Stand Transfer    Sit-Stand [place] (Transfers) set up;verbal cues;nonverbal cues " (demo/gesture);moderate assist (50% patient effort);maximum assist (25% patient effort);2 person assist  -DANIA Flaherty Name 06/20/25 0848          Stand-Sit Transfer    Stand-Sit [place] (Transfers) set up;verbal cues;nonverbal cues (demo/gesture);moderate assist (50% patient effort);maximum assist (25% patient effort);2 person assist  -DANIA       Row Name 06/20/25 0848          Functional Mobility    Functional Mobility- Comment w/c level at baseline.  -DANIA       [facility] Name 06/20/25 0848          Activities of Daily Living    BADL Assessment/Intervention toileting;feeding;grooming;lower body dressing;upper body dressing;bathing  -       Row Name 06/20/25 0848          Lower Body Dressing Assessment/Training    [place] Level (Lower Body Dressing) don;pants/bottoms;set up;verbal cues;maximum assist (25% patient effort)  -LE     Position (Lower Body Dressing) supported standing;supported sitting  -LE     Comment, (Lower Body Dressing) pt reports donns pants on own at home.  when prompted to attempt to thread pants today pt reports he is unable.  OT threads and  2 person assist to stand and hike pants.  -DANIA       [facility] Name 06/20/25 0848          Toileting Assessment/Training    [place] Level (Toileting) dependent (less than 25% patient effort)  -LE     Comment, (Toileting) currently using purwick.  -DANIA       [facility] Name 06/20/25 0848          Wheelchair Transfer    Type (Wheelchair Transfer) stand pivot/stand step  -LE     [place] Level (Wheelchair Transfer) set up;verbal cues;nonverbal cues (demo/gesture);moderate assist (50% patient effort);maximum assist (25% patient effort);2 person assist  -LE               User Key  (r) = Recorded By, (t) = Taken By, (c) = Cosigned By      Initials Name Provider Type    Angy Rodriguez, OTR/L, CSRS Occupational Therapist                   Obj/Interventions       Row Name 06/20/25 0851          Vision Assessment/Intervention    Vision Assessment Comment denies  blurred, diplopia  -       Row Name 06/20/25 0826          Range of Motion Comprehensive    Comment, General Range of Motion h/o R limitation from CVA.  can raise B shoulder to 1/2 (h/o L RTC impairment), B elbows 2/3 and R hand 2/3 with impaired grasp.  -       Row Name 06/20/25 0871          Strength Comprehensive (MMT)    Comment, General Manual Muscle Testing (MMT) Assessment R UE grossly 3+/4 to 4+/5.  L UE grossly 4-/4 at shld and 4/5 elbow/.  -       Row Name 06/20/25 0851          Balance    Balance Assessment sitting static balance;standing static balance;standing dynamic balance  -LE     Static Sitting Balance standby assist  -LE     Dynamic Standing Balance moderate assist;2-person assist;verbal cues  -LE     Position/Device Used, Standing Balance supported  -LE     Comment, Balance 2 person assist to stand and hike pants.  -LE               User Key  (r) = Recorded By, (t) = Taken By, (c) = Cosigned By      Initials Name Provider Type    Angy Rodriguez, OTR/L, CSRS Occupational Therapist                   Goals/Plan       Row Name 06/20/25 0895          Transfer Goal 1 (OT)    Activity/Assistive Device (Transfer Goal 1, OT) sit-to-stand/stand-to-sit;bed-to-chair/chair-to-bed;toilet;commode, 3-in-1  -LE     [place] Level/Cues Needed (Transfer Goal 1, OT) set-up required;minimum assist (75% or more patient effort);verbal cues required  -LE     Time Frame (Transfer Goal 1, OT) 2 weeks  -LE     Progress/Outcome (Transfer Goal 1, OT) goal ongoing  -       Row Name 06/20/25 0806          Dressing Goal 1 (OT)    Activity/Device (Dressing Goal 1, OT) lower body dressing;long-handled shoe horn;reacher;sock-aid  -LE     [place]/Cues Needed (Dressing Goal 1, OT) standby assist;set-up required;minimum assist (75% or more patient effort)  -LE     Time Frame (Dressing Goal 1, OT) 2 weeks  -LE     Progress/Outcome (Dressing Goal 1, OT) goal ongoing  -[facility] Name 06/20/25 0897           Toileting Goal 1 (OT)    Activity/Device (Toileting Goal 1, OT) toileting skills, all;commode, 3-in-1;grab bar/safety frame  -LE     [place] Level/Cues Needed (Toileting Goal 1, OT) set-up required;minimum assist (75% or more patient effort)  -LE     Time Frame (Toileting Goal 1, OT) 2 weeks  -LE     Progress/Outcome (Toileting Goal 1, OT) goal ongoing  -LE       Row Name 06/20/25 0855          Problem Specific Goal 1 (OT)    Problem Specific Goal 1 (OT) Pt to demo 3 min sustained standing with min A to increase independence with clothing management.  -LE     Time Frame (Problem Specific Goal 1, OT) 2 weeks  -LE     Progress/Outcome (Problem Specific Goal 1, OT) goal ongoing  -LE       Row Name 06/20/25 0855          Therapy Assessment/Plan (OT)    Planned Therapy Interventions (OT) activity tolerance training;adaptive equipment training;BADL retraining;functional balance retraining;patient/caregiver education/training;strengthening exercise;transfer/mobility retraining  -LE               User Key  (r) = Recorded By, (t) = Taken By, (c) = Cosigned By      Initials Name Provider Type    Angy Rodriguez, OTR/L, CSRS Occupational Therapist                   Clinical Impression       Row Name 06/20/25 0854          Pain Assessment    Pretreatment Pain Rating 0/10 - no pain  -LE     Posttreatment Pain Rating 0/10 - no pain  -LE       Row Name 06/20/25 0854          Plan of Care Review    Plan of Care Reviewed With patient  -LE     Outcome Evaluation Pt admit from home with acute back pain.  Pt lives with brother and AURA and uses a w/c at baseline due to h/o CVA.   Pt with impaired processing/responses and difficult to clarify how much assist family provides with ADL. Today pt presents sitting EOB with PT. Pt requires assist to reji pants, 2 person assist to stand and hike pants and xfer to w/c.   Pt with impaired ROM/strength R UE and uses L UE dominantly since CVA.  Pt may benefit from SNU stay to work on strength,  mobility and activity tolerance.  -LE       Row Name 06/20/25 0854          Therapy Assessment/Plan (OT)    Patient/Family Therapy Goal Statement (OT) Get stronger and move better.  -LE     Rehab Potential (OT) good  -LE     Criteria for Skilled Therapeutic Interventions Met (OT) meets criteria;yes  -LE     Therapy Frequency (OT) 3 times/wk  -LE       Row Name 06/20/25 0854          Therapy Plan Review/Discharge Plan (OT)    Equipment Needs Upon Discharge (OT) --  has w/c, rails around toilet.  -LE     Anticipated Discharge Disposition (OT) skilled nursing facility  -LE       Row Name 06/20/25 0854          Vital Signs    O2 Delivery Pre Treatment room air  -LE     Pre Patient Position Sitting  EOB  -LE     Intra Patient Position Standing  -LE     Post Patient Position Sitting  w/c.  -LE       Row Name 06/20/25 0854          Positioning and Restraints    Pre-Treatment Position in bed  -LE     Post Treatment Position wheelchair  -LE     In Wheelchair notified nsg;sitting;call light within reach;encouraged to call for assist;exit alarm on  discuss pt d/c plan with CCP.  -LE               User Key  (r) = Recorded By, (t) = Taken By, (c) = Cosigned By      Initials Name Provider Type    Angy Rodriguez, OTR/L, CSRS Occupational Therapist                   Outcome Measures       Row Name 06/20/25 0856          How much help from another is currently needed...    Putting on and taking off regular lower body clothing? 2  -LE     Bathing (including washing, rinsing, and drying) 2  -LE     Toileting (which includes using toilet bed pan or urinal) 1  -LE     Putting on and taking off regular upper body clothing 2  -LE     Taking care of personal grooming (such as brushing teeth) 3  -LE     Eating meals 3  -LE     AM-PAC 6 Clicks Score (OT) 13  -LE       Row Name 06/20/25 0856          How much help from another person do you currently need...    Turning from your back to your side while in flat bed without using bedrails? 2   -LH     Moving from lying on back to sitting on the side of a flat bed without bedrails? 2  -LH     Moving to and from a bed to a chair (including a wheelchair)? 2  -LH     Standing up from a chair using your arms (e.g., wheelchair, bedside chair)? 2  -LH     Climbing 3-5 steps with a railing? 1  -LH     To walk in hospital room? 1  -LH     AM-PAC 6 Clicks Score (PT) 10  -LH       Row Name 06/20/25 0856          Functional Assessment    Outcome Measure Options AM-PAC 6 Clicks Daily Activity (OT)  -LE               User Key  (r) = Recorded By, (t) = Taken By, (c) = Cosigned By      Initials Name Provider Type    LE Angy Santiago OTR/L, CSRS Occupational Therapist     Angy Curiel, PT Physical Therapist                    Occupational Therapy Education       Title: PT OT SLP Therapies (In Progress)       Topic: Occupational Therapy (In Progress)       Point: ADL training (Done)       Learning Progress Summary            Patient Acceptance, E, Bed IU by LE at 6/20/2025 0856                      Point: Precautions (Done)       Learning Progress Summary            Patient Acceptance, E, Bed IU by LE at 6/20/2025 0856                      Point: Body mechanics (Done)       Learning Progress Summary            Patient Acceptance, E, Bed IU by LE at 6/20/2025 0856                                      User Key       Initials Effective Dates Name Provider Type Discipline     04/25/25 -  Angy Santiago OTR/L, SERJIO Occupational Therapist OT                  OT Recommendation and Plan  Planned Therapy Interventions (OT): activity tolerance training, adaptive equipment training, BADL retraining, functional balance retraining, patient/caregiver education/training, strengthening exercise, transfer/mobility retraining  Therapy Frequency (OT): 3 times/wk  Plan of Care Review  Plan of Care Reviewed With: patient  Outcome Evaluation: Pt admit from home with acute back pain.  Pt lives with brother and AURA and uses a w/c at baseline  due to h/o CVA.   Pt with impaired processing/responses and difficult to clarify how much assist family provides with ADL. Today pt presents sitting EOB with PT. Pt requires assist to reji pants, 2 person assist to stand and hike pants and xfer to w/c.   Pt with impaired ROM/strength R UE and uses L UE dominantly since CVA.  Pt may benefit from SNU stay to work on strength, mobility and activity tolerance.     Time Calculation:   Evaluation Complexity (OT)  Review Occupational Profile/Medical/Therapy History Complexity: expanded/moderate complexity  Assessment, Occupational Performance/Identification of Deficit Complexity: 3-5 performance deficits  Clinical Decision Making Complexity (OT): detailed assessment/moderate complexity  Overall Complexity of Evaluation (OT): moderate complexity     Time Calculation- OT       Row Name 06/20/25 0858             Time Calculation- OT    OT Start Time 0818  -LE      OT Stop Time 0832  -LE      OT Time Calculation (min) 14 min  -LE      Total Timed Code Minutes- OT 9 minute(s)  -LE      OT Received On 06/20/25  -LE      OT - Next Appointment 06/23/25  -LE      OT Goal Re-Cert Due Date 07/04/25  -LE         Timed Charges    28771 - OT Therapeutic Activity Minutes 9  -LE         Total Minutes    Timed Charges Total Minutes 9  -LE       Total Minutes 9  -LE                User Key  (r) = Recorded By, (t) = Taken By, (c) = Cosigned By      Initials Name Provider Type    Angy Rodriguez, OTR/L, CSRS Occupational Therapist                  Therapy Charges for Today       Code Description Service Date Service Provider Modifiers Qty    [id number]  OT THERAPEUTIC ACT EA 15 MIN 6/20/2025 Angy Santiago, OTR/L, CSRS GO 1    23063074808 HC OT EVAL MOD COMPLEXITY 3 6/20/2025 Angy Santiago, OTR/L, CSRS GO 1                 Angy Elder, OTR/L, CSRS  6/20/2025

## 2025-06-20 NOTE — CASE MANAGEMENT/SOCIAL WORK
Post-Acute Authorization Submission      Post Acute Pre-Cert Documentation  Request Submitted by Facility - Type:: Hospital  Post-Acute Authorization Type Submitted:: SNF  Date Post Acute Pre-Cert Inititated per Facility: 06/20/25  Accepting Facility: [facility] Discharge Date Requested: 06/20/25  All Clinicals Submitted?: Yes  Had Accepting Facility at Time of Submission: Yes  Response Communicated to:: , Accepting Facility Liaison  Authorization Number:: PENDING [id number]              ALEXEY Batista

## 2025-06-20 NOTE — PLAN OF CARE
Goal Outcome Evaluation:  Plan of Care Reviewed With: patient        Progress: improving  Outcome Evaluation: Pt agreeable and cooperative to skilled PT. Pt required Mod A bed mobility, Pt transfered OOB to WC mod/max A x 2 stand pivot into his personal WC. Leg rests provided for pts WC. Pt practiced turning and propulsion in WC Min A . Anticipate DC to SNU as pt would benefit from skilled PT services for increased independent w functional mobilization including bed mobility, tsfs and WC mobilization. Pt worked on trunk stability sitting EOB SBA w good static sitting balance.     Anticipated Discharge Disposition (PT): skilled nursing facility

## 2025-06-23 NOTE — CASE MANAGEMENT/SOCIAL WORK
Post-Acute Authorization Submission      Post Acute Pre-Cert Documentation  Request Submitted by Facility - Type:: Hospital  Post-Acute Authorization Type Submitted:: SNF  Date Post Acute Pre-Cert Inititated per Facility: 06/20/25  Date Post Acute Pre-Cert Completed: 06/20/25  Accepting Facility: [facility] Discharge Date Requested: 06/20/25  All Clinicals Submitted?: Yes  Had Accepting Facility at Time of Submission: Yes  Response Received from Insurance?: Approval  Response Communicated to:: , Accepting Facility Liaison, Accepting Facility Auth Department  Authorization Number:: APPROVED [id number]  Post Acute Pre-Cert Initiated Comment: VALID TO ADMIT UPTO 6/27/25              Deric Benítez PCT

## 2025-07-14 ENCOUNTER — TELEPHONE (OUTPATIENT)
Dept: INTERNAL MEDICINE | Age: 85
End: 2025-07-14

## 2025-07-17 ENCOUNTER — TELEPHONE (OUTPATIENT)
Dept: INTERNAL MEDICINE | Age: 85
End: 2025-07-17
Payer: MEDICARE

## 2025-07-17 NOTE — TELEPHONE ENCOUNTER
Caller: TERRANCE    Relationship: Home Health    Best call back number: [phone]    Additional notes: CAROL STATES THAT SHE COMPETED A SOCIAL WORK EVALUATION ON 7/17/25 AND SHE WOULD LIKE [facility] ORDERS TO SEE THE PATIENT FOR ONE ADDITIONAL VISIT.

## 2025-07-17 NOTE — TELEPHONE ENCOUNTER
Called [facility] back and informed them that EVELIA Rosario was acceptable to verbal orders.

## 2025-07-22 ENCOUNTER — OFFICE VISIT (OUTPATIENT)
Dept: INTERNAL MEDICINE | Age: 85
End: 2025-07-22
Payer: MEDICARE

## 2025-07-22 VITALS
WEIGHT: 188 LBS | SYSTOLIC BLOOD PRESSURE: 142 MMHG | HEIGHT: 70 IN | HEART RATE: 60 BPM | DIASTOLIC BLOOD PRESSURE: 68 MMHG | BODY MASS INDEX: 26.92 KG/M2 | OXYGEN SATURATION: 99 % | TEMPERATURE: 97.6 F

## 2025-07-22 DIAGNOSIS — R73.03 PREDIABETES: ICD-10-CM

## 2025-07-22 DIAGNOSIS — M54.50 LUMBAR BACK PAIN: Primary | ICD-10-CM

## 2025-07-22 NOTE — PROGRESS NOTES
"[facility] [facility]  EVELIA Rosario    Benja Macedo / 84 y.o. / male  07/22/2025      CC:   Transitional Care Management (Was seen In hospital for Lumbar back pain on 06/16/2025//)      VITALS    Visit Vitals  /68   Pulse 60   Temp 97.6 °F (36.4 °C)   Ht 177.8 cm (70\")   Wt 85.3 kg (188 lb) Comment: reported by patient   SpO2 99%   BMI 26.98 kg/m²       BP Readings from Last 3 Encounters:   07/22/25 142/68   06/20/25 142/65   02/13/25 138/76     Wt Readings from Last 3 Encounters:   07/22/25 85.3 kg (188 lb)   06/19/25 81.8 kg (180 lb 6.4 oz)   02/13/25 79.4 kg (175 lb)      Body mass index is 26.98 kg/m².    HPI:     Date of admission/discharge: 06/16/2025 - 06/20/2025   Hospital: [facility]  Principle Dx: Lumbar back pain  Secondary Dx: Generalized weakness; stage IIIb chronic kidney disease; type 2 diabetes; GERD; hyperlipidemia; HTN  History prior to hospitalization: Patient presented to the [facility] complaining of severe pain with any type of movement of his lower back the last 4 to 5 days.    Treatment/Evaluation:  He was found of chronic thoracic and lumbar spine abnormalities.  He had chronic low back pain and muscle spasms that increased over the last week.  No radiation of bilateral lower extremity pain.  No loss of bowel or bladder or saddle anesthesia.  MRI of the lumbar and thoracic spine showed no acute abnormalities.  Deconditioning of upper extremities and he was discharged to SNF for PT OT.  Creatinine remained around his baseline.  Hemoglobin A1c 6.3.    Evaluation/Treatment:   Course: Today he is still working with care tenders twice weekly.  He has a home nurse that comes once a week along with a caregiver who helps bathe him once weekly.  Pain is drastically improved using lidocaine patches.  He is hesitant to medication for blood sugar.    Patient Care Team:  Yo Mckeon DNP, APRN as PCP - General (Internal Medicine)  Luz Marina Solano MD as Consulting " Physician (Nephrology)  ____________________________________________________________________    ASSESSMENT & PLAN:    Problem List Items Addressed This Visit       Lumbar back pain - Primary     Other Visit Diagnoses         Prediabetes                 Summary/Discussion:  Blood sugar is in a prediabetic range hemoglobin A1c 6.3.  This varies by his diet.  He has been low around 6.0 in the past.  He is hesitant to medication.  Could discuss with nephrology Jardiance or Farxiga if GFR is borderline however for potential admit for hyperglycemia.  Could consider lower dose.  He will run it by nephrology.  We can send in lidocaine patches if needed.  Pain improving with therapy.    Return for Next scheduled follow up.    ____________________________________________________________________    REVIEW OF SYSTEMS    Review of Systems  Constitutional neg except per HPI     PHYSICAL EXAMINATION    Physical Exam  Constitutional  No distress; generalized weakness  Cardiovascular Rate  normal . Rhythm: regular . Heart sounds:  normal  Pulmonary/Chest  Effort normal. Breath sounds:  normal  Musc lower extremity weakness upper extremity strengthening  Psychiatric  Alert. Judgment and thought content normal. Mood normal      REVIEWED DATA:    Labs:   No visits with results within 14 Day(s) from this visit.   Latest known visit with results is:   Admission on 06/16/2025, Discharged on 06/20/2025   Component Date Value Ref Range Status    Glucose 06/16/2025 141 (H)  65 - 99 mg/dL Final    BUN 06/16/2025 25.0 (H)  8.0 - 23.0 mg/dL Final    Creatinine 06/16/2025 2.07 (H)  0.76 - 1.27 mg/dL Final    Sodium 06/16/2025 141  136 - 145 mmol/L Final    Potassium 06/16/2025 4.9  3.5 - 5.2 mmol/L Final    Chloride 06/16/2025 105  98 - 107 mmol/L Final    CO2 06/16/2025 22.7  22.0 - 29.0 mmol/L Final    Calcium 06/16/2025 9.8  8.6 - 10.5 mg/dL Final    Total Protein 06/16/2025 7.4  6.0 - 8.5 g/dL Final    Albumin 06/16/2025 4.2  3.5 - 5.2 g/dL  Final    ALT (SGPT) 06/16/2025 32  1 - 41 U/L Final    AST (SGOT) 06/16/2025 15  1 - 40 U/L Final    Alkaline Phosphatase 06/16/2025 97  39 - 117 U/L Final    Total Bilirubin 06/16/2025 0.7  0.0 - 1.2 mg/dL Final    Globulin 06/16/2025 3.2  gm/dL Final    A/G Ratio 06/16/2025 1.3  g/dL Final    BUN/Creatinine Ratio 06/16/2025 12.1  7.0 - 25.0 Final    Anion Gap 06/16/2025 13.3  5.0 - 15.0 mmol/L Final    eGFR 06/16/2025 31.4 (L)  >60.0 mL/min/1.73 Final    WBC 06/16/2025 10.69  3.40 - 10.80 10*3/mm3 Final    RBC 06/16/2025 4.57  4.14 - 5.80 10*6/mm3 Final    Hemoglobin 06/16/2025 15.5  13.0 - 17.7 g/dL Final    Hematocrit 06/16/2025 45.8  37.5 - 51.0 % Final    MCV 06/16/2025 100.2 (H)  79.0 - 97.0 fL Final    MCH 06/16/2025 33.9 (H)  26.6 - 33.0 pg Final    MCHC 06/16/2025 33.8  31.5 - 35.7 g/dL Final    RDW 06/16/2025 12.1 (L)  12.3 - 15.4 % Final    RDW-SD 06/16/2025 44.9  37.0 - 54.0 fl Final    MPV 06/16/2025 10.4  6.0 - 12.0 fL Final    Platelets 06/16/2025 237  140 - 450 10*3/mm3 Final    Neutrophil % 06/16/2025 75.9  42.7 - 76.0 % Final    Lymphocyte % 06/16/2025 16.9 (L)  19.6 - 45.3 % Final    Monocyte % 06/16/2025 5.3  5.0 - 12.0 % Final    Eosinophil % 06/16/2025 1.4  0.3 - 6.2 % Final    Basophil % 06/16/2025 0.2  0.0 - 1.5 % Final    Immature Grans % 06/16/2025 0.3  0.0 - 0.5 % Final    Neutrophils, Absolute 06/16/2025 8.11 (H)  1.70 - 7.00 10*3/mm3 Final    Lymphocytes, Absolute 06/16/2025 1.81  0.70 - 3.10 10*3/mm3 Final    Monocytes, Absolute 06/16/2025 0.57  0.10 - 0.90 10*3/mm3 Final    Eosinophils, Absolute 06/16/2025 0.15  0.00 - 0.40 10*3/mm3 Final    Basophils, Absolute 06/16/2025 0.02  0.00 - 0.20 10*3/mm3 Final    Immature Grans, Absolute 06/16/2025 0.03  0.00 - 0.05 10*3/mm3 Final    nRBC 06/16/2025 0.0  0.0 - 0.2 /100 WBC Final    Glucose 06/17/2025 99  65 - 99 mg/dL Final    BUN 06/17/2025 25.0 (H)  8.0 - 23.0 mg/dL Final    Creatinine 06/17/2025 1.62 (H)  0.76 - 1.27 mg/dL Final     Sodium 06/17/2025 139  136 - 145 mmol/L Final    Potassium 06/17/2025 4.1  3.5 - 5.2 mmol/L Final    Chloride 06/17/2025 107  98 - 107 mmol/L Final    CO2 06/17/2025 23.0  22.0 - 29.0 mmol/L Final    Calcium 06/17/2025 8.9  8.6 - 10.5 mg/dL Final    BUN/Creatinine Ratio 06/17/2025 15.4  7.0 - 25.0 Final    Anion Gap 06/17/2025 9.0  5.0 - 15.0 mmol/L Final    eGFR 06/17/2025 42.1 (L)  >60.0 mL/min/1.73 Final    WBC 06/17/2025 10.56  3.40 - 10.80 10*3/mm3 Final    RBC 06/17/2025 3.97 (L)  4.14 - 5.80 10*6/mm3 Final    Hemoglobin 06/17/2025 13.0  13.0 - 17.7 g/dL Final    Hematocrit 06/17/2025 40.5  37.5 - 51.0 % Final    MCV 06/17/2025 102.0 (H)  79.0 - 97.0 fL Final    MCH 06/17/2025 32.7  26.6 - 33.0 pg Final    MCHC 06/17/2025 32.1  31.5 - 35.7 g/dL Final    RDW 06/17/2025 11.9 (L)  12.3 - 15.4 % Final    RDW-SD 06/17/2025 45.2  37.0 - 54.0 fl Final    MPV 06/17/2025 10.7  6.0 - 12.0 fL Final    Platelets 06/17/2025 222  140 - 450 10*3/mm3 Final    Neutrophil % 06/17/2025 60.2  42.7 - 76.0 % Final    Lymphocyte % 06/17/2025 27.1  19.6 - 45.3 % Final    Monocyte % 06/17/2025 9.3  5.0 - 12.0 % Final    Eosinophil % 06/17/2025 2.7  0.3 - 6.2 % Final    Basophil % 06/17/2025 0.4  0.0 - 1.5 % Final    Immature Grans % 06/17/2025 0.3  0.0 - 0.5 % Final    Neutrophils, Absolute 06/17/2025 6.36  1.70 - 7.00 10*3/mm3 Final    Lymphocytes, Absolute 06/17/2025 2.86  0.70 - 3.10 10*3/mm3 Final    Monocytes, Absolute 06/17/2025 0.98 (H)  0.10 - 0.90 10*3/mm3 Final    Eosinophils, Absolute 06/17/2025 0.29  0.00 - 0.40 10*3/mm3 Final    Basophils, Absolute 06/17/2025 0.04  0.00 - 0.20 10*3/mm3 Final    Immature Grans, Absolute 06/17/2025 0.03  0.00 - 0.05 10*3/mm3 Final    nRBC 06/17/2025 0.0  0.0 - 0.2 /100 WBC Final    Vitamin B-12 06/17/2025 >2,000 (H)  211 - 946 pg/mL Final    Folate 06/17/2025 >20.00  4.78 - 24.20 ng/mL Final    TSH 06/17/2025 1.870  0.270 - 4.200 uIU/mL Final    Hemoglobin A1C 06/17/2025 6.30 (H)  4.80 -  5.60 % Final    WBC 06/19/2025 10.18  3.40 - 10.80 10*3/mm3 Final    RBC 06/19/2025 4.06 (L)  4.14 - 5.80 10*6/mm3 Final    Hemoglobin 06/19/2025 13.4  13.0 - 17.7 g/dL Final    Hematocrit 06/19/2025 41.7  37.5 - 51.0 % Final    MCV 06/19/2025 102.7 (H)  79.0 - 97.0 fL Final    MCH 06/19/2025 33.0  26.6 - 33.0 pg Final    MCHC 06/19/2025 32.1  31.5 - 35.7 g/dL Final    RDW 06/19/2025 12.2 (L)  12.3 - 15.4 % Final    RDW-SD 06/19/2025 46.3  37.0 - 54.0 fl Final    MPV 06/19/2025 10.5  6.0 - 12.0 fL Final    Platelets 06/19/2025 214  140 - 450 10*3/mm3 Final    Glucose 06/19/2025 95  65 - 99 mg/dL Final    BUN 06/19/2025 28.0 (H)  8.0 - 23.0 mg/dL Final    Creatinine 06/19/2025 1.56 (H)  0.76 - 1.27 mg/dL Final    Sodium 06/19/2025 142  136 - 145 mmol/L Final    Potassium 06/19/2025 4.2  3.5 - 5.2 mmol/L Final    Chloride 06/19/2025 106  98 - 107 mmol/L Final    CO2 06/19/2025 22.4  22.0 - 29.0 mmol/L Final    Calcium 06/19/2025 8.8  8.6 - 10.5 mg/dL Final    BUN/Creatinine Ratio 06/19/2025 17.9  7.0 - 25.0 Final    Anion Gap 06/19/2025 13.6  5.0 - 15.0 mmol/L Final    eGFR 06/19/2025 44.1 (L)  >60.0 mL/min/1.73 Final    Extra Tube 06/19/2025 hold for add-on   Final    Auto resulted    Extra Tube 06/19/2025 Hold for add-ons.   Final    Auto resulted.    WBC 06/20/2025 9.83  3.40 - 10.80 10*3/mm3 Final    RBC 06/20/2025 4.09 (L)  4.14 - 5.80 10*6/mm3 Final    Hemoglobin 06/20/2025 13.5  13.0 - 17.7 g/dL Final    Hematocrit 06/20/2025 41.1  37.5 - 51.0 % Final    MCV 06/20/2025 100.5 (H)  79.0 - 97.0 fL Final    MCH 06/20/2025 33.0  26.6 - 33.0 pg Final    MCHC 06/20/2025 32.8  31.5 - 35.7 g/dL Final    RDW 06/20/2025 12.3  12.3 - 15.4 % Final    RDW-SD 06/20/2025 45.3  37.0 - 54.0 fl Final    MPV 06/20/2025 10.2  6.0 - 12.0 fL Final    Platelets 06/20/2025 228  140 - 450 10*3/mm3 Final    Extra Tube 06/20/2025 hold for add-on   Final    Auto resulted    Extra Tube 06/20/2025 Hold for add-ons.   Final    Auto resulted.        Imaging:   No results found.     Medical Tests:        Summary of old records / correspondence / consultant report:   DC summary re: issues addressed on HPI    Request outside records:       ALLERGIES  No Known Allergies     MEDICATIONS   Current Outpatient Medications   Medication Sig Dispense Refill    acetaminophen (TYLENOL) 500 MG tablet Take 2 tablets by mouth At Night As Needed.      ASPIRIN 81 PO Take 81 mg by mouth Daily.      atorvastatin (LIPITOR) 20 MG tablet TAKE 1 TABLET BY MOUTH DAILY 90 tablet 1    carvedilol (COREG) 6.25 MG tablet TAKE 1 TABLET BY MOUTH TWICE A DAY WITH A MEAL 180 tablet 1    coenzyme Q10 100 MG capsule Take 1 capsule by mouth Daily.      fluticasone (FLONASE) 50 MCG/ACT nasal spray Administer 2 sprays into the nostril(s) as directed by provider Daily As Needed.      Lidocaine 4 % Place 1 patch on the skin as directed by provider Daily. Remove & Discard patch within 12 hours or as directed by MD 15 patch 0    Melatonin 5 MG capsule Take 1 tablet by mouth Daily.      Multiple Vitamins-Minerals (MULTIVITAMIN ADULT PO) Take  by mouth Daily.      Omega-3 Fatty Acids (fish oil) 1000 MG capsule capsule Take  by mouth Daily With Breakfast.      vitamin B-12 (CYANOCOBALAMIN) 1000 MCG tablet Take 1 tablet by mouth Daily.      vitamin C (ASCORBIC ACID) 500 MG tablet Take 1 tablet by mouth Daily.       No current facility-administered medications for this visit.       Current outpatient and discharge medications have been reconciled for the patient.  Reviewed by: Yo Mckeon, ANN MARIE, APRN       [facility]:     The following portions of the patient's history were reviewed and updated as appropriate: Allergies / Current Medications / Past Medical History / Surgical History / Social History / Family History    PROBLEM LIST   Patient Active Problem List   Diagnosis    Benign essential hypertension    Gastroesophageal reflux disease    Hyperglycemia    Hyperlipidemia    Nocturia    Renal insufficiency     Cerebrovascular accident - history of    Type 2 diabetes mellitus    Benign hypertension with chronic kidney disease    Stage 3b chronic kidney disease    Edema of lower extremity    Dyslipidemia    History of cerebrovascular accident    Pain of left shoulder joint on movement    Weakness    UTI (urinary tract infection)    Acute UTI (urinary tract infection)    Nodule of upper lobe of left lung    Abnormal CT of the abdomen    Abnormal CT of the head    Vitamin D deficiency    Lumbar back pain       PAST MEDICAL HISTORY  Past Medical History:   Diagnosis Date    Acute UTI (urinary tract infection)     Diabetes mellitus     Hypertension     Renal insufficiency        SURGICAL HISTORY  Past Surgical History:   Procedure Laterality Date    HAND SURGERY Left 1969    FX    OTHER SURGICAL HISTORY Bilateral     multiple surgeries for fx  - both legs    OTHER SURGICAL HISTORY      blaze carotid procedure       SOCIAL HISTORY  Social History     Socioeconomic History    Marital status: Single   Tobacco Use    Smoking status: Former     Current packs/day: 0.00     Average packs/day: 1 pack/day for 23.0 years (23.0 ttl pk-yrs)     Types: Cigarettes     Start date:      Quit date:      Years since quittin.5    Smokeless tobacco: Never   Vaping Use    Vaping status: Never Used   Substance and Sexual Activity    Alcohol use: Not Currently    Drug use: Never    Sexual activity: Defer       FAMILY HISTORY  Family History   Problem Relation Age of Onset    Stroke Mother     Heart attack Father     Stroke Brother     Diverticulitis Brother     Hypertension Brother

## 2025-08-04 ENCOUNTER — TELEPHONE (OUTPATIENT)
Dept: INTERNAL MEDICINE | Age: 85
End: 2025-08-04
Payer: MEDICARE

## 2025-08-05 ENCOUNTER — TELEPHONE (OUTPATIENT)
Dept: INTERNAL MEDICINE | Age: 85
End: 2025-08-05
Payer: MEDICARE

## 2025-08-14 ENCOUNTER — OFFICE VISIT (OUTPATIENT)
Dept: INTERNAL MEDICINE | Age: 85
End: 2025-08-14
Payer: MEDICARE

## 2025-08-14 VITALS
DIASTOLIC BLOOD PRESSURE: 74 MMHG | WEIGHT: 188 LBS | HEART RATE: 89 BPM | HEIGHT: 70 IN | TEMPERATURE: 96.9 F | OXYGEN SATURATION: 97 % | BODY MASS INDEX: 26.92 KG/M2 | SYSTOLIC BLOOD PRESSURE: 132 MMHG

## 2025-08-14 DIAGNOSIS — R91.1 NODULE OF UPPER LOBE OF LEFT LUNG: ICD-10-CM

## 2025-08-14 DIAGNOSIS — E53.8 B12 DEFICIENCY: ICD-10-CM

## 2025-08-14 DIAGNOSIS — I12.9 BENIGN HYPERTENSION WITH CHRONIC KIDNEY DISEASE: ICD-10-CM

## 2025-08-14 DIAGNOSIS — R21 RASH: ICD-10-CM

## 2025-08-14 DIAGNOSIS — E11.9 TYPE 2 DIABETES MELLITUS WITHOUT COMPLICATION, WITHOUT LONG-TERM CURRENT USE OF INSULIN: ICD-10-CM

## 2025-08-14 DIAGNOSIS — R73.03 PREDIABETES: ICD-10-CM

## 2025-08-14 DIAGNOSIS — E55.9 VITAMIN D DEFICIENCY: ICD-10-CM

## 2025-08-14 DIAGNOSIS — E78.2 MIXED HYPERLIPIDEMIA: Primary | ICD-10-CM

## 2025-08-14 DIAGNOSIS — K21.9 GASTROESOPHAGEAL REFLUX DISEASE WITHOUT ESOPHAGITIS: ICD-10-CM

## 2025-08-14 RX ORDER — TRIAMCINOLONE ACETONIDE 0.25 MG/G
1 OINTMENT TOPICAL 2 TIMES DAILY PRN
Qty: 15 G | Refills: 1 | Status: SHIPPED | OUTPATIENT
Start: 2025-08-14

## 2025-08-15 LAB
25(OH)D3+25(OH)D2 SERPL-MCNC: 45.8 NG/ML (ref 30–100)
ALBUMIN SERPL-MCNC: 4.4 G/DL (ref 3.7–4.7)
ALP SERPL-CCNC: 99 IU/L (ref 44–121)
ALT SERPL-CCNC: 31 IU/L (ref 0–44)
AST SERPL-CCNC: 19 IU/L (ref 0–40)
BASOPHILS # BLD AUTO: 0.1 X10E3/UL (ref 0–0.2)
BASOPHILS NFR BLD AUTO: 1 %
BILIRUB SERPL-MCNC: 0.4 MG/DL (ref 0–1.2)
BUN SERPL-MCNC: 23 MG/DL (ref 8–27)
BUN/CREAT SERPL: 13 (ref 10–24)
CALCIUM SERPL-MCNC: 10.1 MG/DL (ref 8.6–10.2)
CHLORIDE SERPL-SCNC: 102 MMOL/L (ref 96–106)
CHOLEST SERPL-MCNC: 152 MG/DL (ref 100–199)
CHOLEST/HDLC SERPL: 4.1 RATIO (ref 0–5)
CO2 SERPL-SCNC: 21 MMOL/L (ref 20–29)
CREAT SERPL-MCNC: 1.74 MG/DL (ref 0.76–1.27)
EGFRCR SERPLBLD CKD-EPI 2021: 39 ML/MIN/1.73
EOSINOPHIL # BLD AUTO: 0.4 X10E3/UL (ref 0–0.4)
EOSINOPHIL NFR BLD AUTO: 5 %
ERYTHROCYTE [DISTWIDTH] IN BLOOD BY AUTOMATED COUNT: 11.9 % (ref 11.6–15.4)
GLOBULIN SER CALC-MCNC: 2.6 G/DL (ref 1.5–4.5)
GLUCOSE SERPL-MCNC: 92 MG/DL (ref 70–99)
HBA1C MFR BLD: 6.6 % (ref 4.8–5.6)
HCT VFR BLD AUTO: 45.2 % (ref 37.5–51)
HDLC SERPL-MCNC: 37 MG/DL
HGB BLD-MCNC: 14.8 G/DL (ref 13–17.7)
IMM GRANULOCYTES # BLD AUTO: 0.1 X10E3/UL (ref 0–0.1)
IMM GRANULOCYTES NFR BLD AUTO: 1 %
LDLC SERPL CALC-MCNC: 91 MG/DL (ref 0–99)
LYMPHOCYTES # BLD AUTO: 2.4 X10E3/UL (ref 0.7–3.1)
LYMPHOCYTES NFR BLD AUTO: 32 %
MAGNESIUM SERPL-MCNC: 2.4 MG/DL (ref 1.6–2.3)
MCH RBC QN AUTO: 32.4 PG (ref 26.6–33)
MCHC RBC AUTO-ENTMCNC: 32.7 G/DL (ref 31.5–35.7)
MCV RBC AUTO: 99 FL (ref 79–97)
MONOCYTES # BLD AUTO: 0.7 X10E3/UL (ref 0.1–0.9)
MONOCYTES NFR BLD AUTO: 9 %
NEUTROPHILS # BLD AUTO: 4.1 X10E3/UL (ref 1.4–7)
NEUTROPHILS NFR BLD AUTO: 52 %
PHOSPHATE SERPL-MCNC: 2.9 MG/DL (ref 2.8–4.1)
PLATELET # BLD AUTO: 263 X10E3/UL (ref 150–450)
POTASSIUM SERPL-SCNC: 4.9 MMOL/L (ref 3.5–5.2)
PROT SERPL-MCNC: 7 G/DL (ref 6–8.5)
RBC # BLD AUTO: 4.57 X10E6/UL (ref 4.14–5.8)
SODIUM SERPL-SCNC: 140 MMOL/L (ref 134–144)
T4 FREE SERPL-MCNC: 1.44 NG/DL (ref 0.82–1.77)
TRIGL SERPL-MCNC: 137 MG/DL (ref 0–149)
TSH SERPL DL<=0.005 MIU/L-ACNC: 1.36 UIU/ML (ref 0.45–4.5)
URATE SERPL-MCNC: 5.5 MG/DL (ref 3.8–8.4)
VIT B12 SERPL-MCNC: >2000 PG/ML (ref 232–1245)
VLDLC SERPL CALC-MCNC: 24 MG/DL (ref 5–40)
WBC # BLD AUTO: 7.7 X10E3/UL (ref 3.4–10.8)

## 2025-08-25 ENCOUNTER — TELEPHONE (OUTPATIENT)
Dept: INTERNAL MEDICINE | Age: 85
End: 2025-08-25
Payer: MEDICARE